# Patient Record
Sex: FEMALE | Race: ASIAN | NOT HISPANIC OR LATINO | Employment: FULL TIME | URBAN - METROPOLITAN AREA
[De-identification: names, ages, dates, MRNs, and addresses within clinical notes are randomized per-mention and may not be internally consistent; named-entity substitution may affect disease eponyms.]

---

## 2017-05-15 ENCOUNTER — OFFICE VISIT (OUTPATIENT)
Dept: FAMILY MEDICINE | Facility: CLINIC | Age: 37
End: 2017-05-15
Payer: COMMERCIAL

## 2017-05-15 VITALS
DIASTOLIC BLOOD PRESSURE: 77 MMHG | OXYGEN SATURATION: 100 % | WEIGHT: 121.13 LBS | SYSTOLIC BLOOD PRESSURE: 124 MMHG | TEMPERATURE: 98.3 F | BODY MASS INDEX: 25.42 KG/M2 | HEIGHT: 58 IN | HEART RATE: 69 BPM

## 2017-05-15 DIAGNOSIS — N91.2 AMENORRHEA: ICD-10-CM

## 2017-05-15 DIAGNOSIS — Z32.01 PREGNANCY TEST POSITIVE: Primary | ICD-10-CM

## 2017-05-15 LAB — BETA HCG QUAL IFA URINE: POSITIVE

## 2017-05-15 PROCEDURE — 84703 CHORIONIC GONADOTROPIN ASSAY: CPT | Performed by: FAMILY MEDICINE

## 2017-05-15 PROCEDURE — 99213 OFFICE O/P EST LOW 20 MIN: CPT | Performed by: FAMILY MEDICINE

## 2017-05-15 RX ORDER — PRENATAL VIT/IRON FUM/FOLIC AC 27MG-0.8MG
1 TABLET ORAL DAILY
Qty: 100 TABLET | Refills: 3 | Status: SHIPPED | OUTPATIENT
Start: 2017-05-15 | End: 2018-02-22

## 2017-05-15 NOTE — MR AVS SNAPSHOT
After Visit Summary   5/15/2017    Reji Lockwood    MRN: [id number]           Patient Information     Date Of Birth          1980        Visit Information        Provider Department      5/15/2017 3:15 PM Fadia Sauceda MD; [employer] [facility]        Today's Diagnoses     Pregnancy test positive    -  1    Amenorrhea           Follow-ups after your visit        Additional Services     OB/GYN REFERRAL       Your provider has referred you to:  [facility]: [facility] [phone]   [email or web address]    Please be aware that coverage of these services is subject to the terms and limitations of your health insurance plan.  Call member services at your health plan with any benefit or coverage questions.      Please bring the following with you to your appointment:    (1) Any X-Rays, CTs or MRIs which have been performed.  Contact the facility where they were done to arrange for  prior to your scheduled appointment.   (2) List of current medications   (3) This referral request   (4) Any documents/labs given to you for this referral                  Who to contact     If you have questions or need follow up information about today's clinic visit or your schedule please contact [facility] directly at [phone].  Normal or non-critical lab and imaging results will be communicated to you by MyChart, letter or phone within 4 business days after the clinic has received the results. If you do not hear from us within 7 days, please contact the clinic through MyChart or phone. If you have a critical or abnormal lab result, we will notify you by phone as soon as possible.  Submit refill requests through [employer] or call your pharmacy and they will forward the refill request to us. Please allow 3 business days for your refill to be completed.          Additional Information  "About Your Visit        Boomrathar[employer]iWide Information     [employer] lets you send messages to your doctor, view your test results, renew your prescriptions, schedule appointments and more. To sign up, go to [email or web address] . Click on \"Log in\" on the left side of the screen, which will take you to the Welcome page. Then click on \"Sign up Now\" on the right side of the page.     You will be asked to enter the access code listed below, as well as some personal information. Please follow the directions to create your username and password.     Your access code is: [email or web address]  Expires: 2017  3:59 PM     Your access code will  in 90 days. If you need help or a new code, please call your [place] clinic or [phone].        Care EveryWhere ID     This is your Care EveryWhere ID. This could be used by other organizations to access your [place] medical records  [id number]        Your Vitals Were     Pulse Temperature Height Last Period Pulse Oximetry Breastfeeding?    69 98.3  F (36.8  C) (Oral) 4' 10\" (1.473 m) 2017 (Approximate) 100% No    BMI (Body Mass Index)                   25.32 kg/m2            Blood Pressure from Last 3 Encounters:   05/15/17 124/77   09/02/15 126/82   07/28/15 130/78    Weight from Last 3 Encounters:   05/15/17 121 lb 2 oz (54.9 kg)   09/02/15 131 lb (59.4 kg)   07/28/15 132 lb (59.9 kg)              We Performed the Following     Beta HCG qual IFA urine     OB/GYN REFERRAL          Today's Medication Changes          These changes are accurate as of: 5/15/17  3:59 PM.  If you have any questions, ask your nurse or doctor.               Start taking these medicines.        Dose/Directions    prenatal multivitamin  plus iron 27-0.8 MG Tabs per tablet   Used for:  Pregnancy test positive   Started by:  Fadia Sauceda MD        Dose:  1 tablet   Take 1 tablet by mouth daily   Quantity:  100 tablet   Refills:  3            Where to get your medicines      These " medications were sent to [facility] - [facility], [place] - [address] [facility]  [address], [facility] [address]     Phone:  [phone]     prenatal multivitamin  plus iron 27-0.8 MG Tabs per tablet                Primary Care Provider    None Specified       No primary provider on file.        Thank you!     Thank you for choosing [facility]  for your care. Our goal is always to provide you with excellent care. Hearing back from our patients is one way we can continue to improve our services. Please take a few minutes to complete the written survey that you may receive in the mail after your visit with us. Thank you!             Your Updated Medication List - Protect others around you: Learn how to safely use, store and throw away your medicines at www.disposemymeds.org.          This list is accurate as of: 5/15/17  3:59 PM.  Always use your most recent med list.                   Brand Name Dispense Instructions for use    cyclobenzaprine 10 MG tablet    FLEXERIL    30 tablet    Take 0.5 tablets (5 mg) by mouth 3 times daily as needed for muscle spasms       ibuprofen 400 MG tablet    ADVIL/MOTRIN    45 tablet    Take 1 tablet (400 mg) by mouth every 6 hours as needed for moderate pain       polyethylene glycol powder    MIRALAX    510 g    Take 17 g by mouth daily       prenatal multivitamin  plus iron 27-0.8 MG Tabs per tablet     100 tablet    Take 1 tablet by mouth daily

## 2017-05-15 NOTE — PROGRESS NOTES
"  SUBJECTIVE:                                                    Reji Lockwood is a 36 year old female who presents to clinic today for the following health issues:         Acute illness concerns: fatigue, runny nose  Onset: 1 month    Fever: no    Chills/Sweats: YES- chills     Headache (location?): YES- on sides of head     Sinus Pressure:no    Conjunctivitis:  no    Ear Pain: no    Rhinorrhea: YES    Congestion: no    Sore Throat: no     Cough: YES - clear phlegm sometimes     Wheeze: no    Decreased Appetite: YES    Nausea: yes- sometimes     Vomiting: no    Diarrhea:  no    Dysuria/Freq.: no    Fatigue/Achiness: YES    Sick/Strep Exposure: no  LMP: Approx 3/28/17  Took pregnancy test last week and it was positive   Therapies Tried and outcome: claritan       Patient believes the above symptoms are related to pregnancy and would like testing and not evaluation of pregnancy.    Problem list and histories reviewed & adjusted, as indicated.  Additional history: as documented    Patient Active Problem List   Diagnosis     CARDIOVASCULAR SCREENING; LDL GOAL LESS THAN 160     Irregular menses     Past Surgical History:   Procedure Laterality Date     NO HISTORY OF SURGERY         Social History   Substance Use Topics     Smoking status: Never Smoker     Smokeless tobacco: Never Used     Alcohol use No     Family History   Problem Relation Age of Onset     KIDNEY DISEASE Mother      Dead     Unknown/Adopted Father            Reviewed and updated as needed this visit by clinical staff       Reviewed and updated as needed this visit by Provider         ROS:  Constitutional, HEENT, cardiovascular, pulmonary, gi and gu systems are negative, except as otherwise noted.    OBJECTIVE:                                                    /77  Pulse 69  Temp 98.3  F (36.8  C) (Oral)  Ht 4' 10\" (1.473 m)  Wt 121 lb 2 oz (54.9 kg)  LMP 03/28/2017 (Approximate)  SpO2 100%  Breastfeeding? No  BMI 25.32 kg/m2  Body mass index " is 25.32 kg/(m^2).  GENERAL: healthy, alert and no distress  NECK: no adenopathy, no asymmetry, masses, or scars and thyroid normal to palpation  RESP: lungs clear to auscultation - no rales, rhonchi or wheezes  CV: regular rate and rhythm, normal S1 S2, no S3 or S4, no murmur, click or rub, no peripheral edema and peripheral pulses strong  ABDOMEN: soft, nontender, no hepatosplenomegaly, no masses and bowel sounds normal  MS: no gross musculoskeletal defects noted, no edema  PSYCH: mentation appears normal, affect normal/bright    Diagnostic Test Results:  Results for orders placed or performed in visit on 05/15/17 (from the past 24 hour(s))   Beta HCG qual IFA urine   Result Value Ref Range    Beta HCG Qual IFA Urine Positive (A) NEG        ASSESSMENT/PLAN:                                                    1. Pregnancy test positive  Referral to OB and start PNV  - OB/GYN REFERRAL  - Prenatal Vit-Fe Fumarate-FA (PRENATAL MULTIVITAMIN  PLUS IRON) 27-0.8 MG TABS per tablet; Take 1 tablet by mouth daily  Dispense: 100 tablet; Refill: 3    2. Amenorrhea    - Beta HCG qual IFA urine    Needs to be seen for more.     FUTURE APPOINTMENTS:       - Follow-up visit with OB    Fadia Serrano MD  [facility]

## 2017-05-15 NOTE — NURSING NOTE
"Chief Complaint   Patient presents with     URI       Initial /77  Pulse 69  Temp 98.3  F (36.8  C) (Oral)  Ht 4' 10\" (1.473 m)  Wt 121 lb 2 oz (54.9 kg)  LMP 03/28/2017 (Approximate)  SpO2 100%  Breastfeeding? No  BMI 25.32 kg/m2 Estimated body mass index is 25.32 kg/(m^2) as calculated from the following:    Height as of this encounter: 4' 10\" (1.473 m).    Weight as of this encounter: 121 lb 2 oz (54.9 kg).  Medication Reconciliation: complete     Sakina Baxter CMA      "

## 2017-06-09 ENCOUNTER — PRENATAL OFFICE VISIT (OUTPATIENT)
Dept: OBGYN | Facility: CLINIC | Age: 37
End: 2017-06-09
Payer: COMMERCIAL

## 2017-06-09 VITALS
SYSTOLIC BLOOD PRESSURE: 122 MMHG | HEIGHT: 58 IN | HEART RATE: 77 BPM | DIASTOLIC BLOOD PRESSURE: 71 MMHG | OXYGEN SATURATION: 100 % | WEIGHT: 128 LBS | TEMPERATURE: 98.9 F | BODY MASS INDEX: 26.87 KG/M2

## 2017-06-09 DIAGNOSIS — Z34.80 PRENATAL CARE, SUBSEQUENT PREGNANCY, UNSPECIFIED TRIMESTER: Primary | ICD-10-CM

## 2017-06-09 LAB
ERYTHROCYTE [DISTWIDTH] IN BLOOD BY AUTOMATED COUNT: 13.3 % (ref 10–15)
HCT VFR BLD AUTO: 38.9 % (ref 35–47)
HGB BLD-MCNC: 13 G/DL (ref 11.7–15.7)
MCH RBC QN AUTO: 29.1 PG (ref 26.5–33)
MCHC RBC AUTO-ENTMCNC: 33.4 G/DL (ref 31.5–36.5)
MCV RBC AUTO: 87 FL (ref 78–100)
PLATELET # BLD AUTO: 204 10E9/L (ref 150–450)
RBC # BLD AUTO: 4.46 10E12/L (ref 3.8–5.2)
WBC # BLD AUTO: 8.7 10E9/L (ref 4–11)

## 2017-06-09 PROCEDURE — 86870 RBC ANTIBODY IDENTIFICATION: CPT | Performed by: OBSTETRICS & GYNECOLOGY

## 2017-06-09 PROCEDURE — 87389 HIV-1 AG W/HIV-1&-2 AB AG IA: CPT | Performed by: OBSTETRICS & GYNECOLOGY

## 2017-06-09 PROCEDURE — 86762 RUBELLA ANTIBODY: CPT | Performed by: OBSTETRICS & GYNECOLOGY

## 2017-06-09 PROCEDURE — 85027 COMPLETE CBC AUTOMATED: CPT | Performed by: OBSTETRICS & GYNECOLOGY

## 2017-06-09 PROCEDURE — 86780 TREPONEMA PALLIDUM: CPT | Performed by: OBSTETRICS & GYNECOLOGY

## 2017-06-09 PROCEDURE — 36415 COLL VENOUS BLD VENIPUNCTURE: CPT | Performed by: OBSTETRICS & GYNECOLOGY

## 2017-06-09 PROCEDURE — 87341 HEP B SURFACE AG NEUTRLZJ IA: CPT | Performed by: OBSTETRICS & GYNECOLOGY

## 2017-06-09 PROCEDURE — 99207 ZZC FIRST OB VISIT: CPT | Performed by: OBSTETRICS & GYNECOLOGY

## 2017-06-09 PROCEDURE — 87340 HEPATITIS B SURFACE AG IA: CPT | Performed by: OBSTETRICS & GYNECOLOGY

## 2017-06-09 PROCEDURE — 87086 URINE CULTURE/COLONY COUNT: CPT | Performed by: OBSTETRICS & GYNECOLOGY

## 2017-06-09 PROCEDURE — 86850 RBC ANTIBODY SCREEN: CPT | Performed by: OBSTETRICS & GYNECOLOGY

## 2017-06-09 PROCEDURE — 86900 BLOOD TYPING SEROLOGIC ABO: CPT | Performed by: OBSTETRICS & GYNECOLOGY

## 2017-06-09 PROCEDURE — 86901 BLOOD TYPING SEROLOGIC RH(D): CPT | Performed by: OBSTETRICS & GYNECOLOGY

## 2017-06-09 ASSESSMENT — PAIN SCALES - GENERAL: PAINLEVEL: NO PAIN (0)

## 2017-06-09 NOTE — LETTER
2017    RE: Reji Lockwood,  1980    To Whom It May Concern:    Reji Lockwood is under our care and was seen at our office on 2017.    Due to her medical condition and complications in her pregnancy she should limit her work hours to 8 hours/day, 40 hours/week without overtime hours, effective 2017. Her due date is 2018.     I hope this information is sufficient for your needs.  If you have any additional questions regarding this matter please contact our office.  Thank you.      Sincerely,        Main Norwood MD

## 2017-06-09 NOTE — NURSING NOTE
"Chief Complaint   Patient presents with     Prenatal Care       Initial /71 (BP Location: Left arm, Patient Position: Chair, Cuff Size: Adult Regular)  Pulse 77  Temp 98.9  F (37.2  C) (Oral)  Ht 4' 10\" (1.473 m)  Wt 128 lb (58.1 kg)  LMP 03/28/2017 (Approximate)  SpO2 100%  BMI 26.75 kg/m2 Estimated body mass index is 26.75 kg/(m^2) as calculated from the following:    Height as of this encounter: 4' 10\" (1.473 m).    Weight as of this encounter: 128 lb (58.1 kg).  Medication Reconciliation: christiano Laureano MA      "

## 2017-06-09 NOTE — MR AVS SNAPSHOT
"              After Visit Summary   2017    Reji Lockwood    MRN: [id number]           Patient Information     Date Of Birth          1980        Visit Information        Provider Department      2017 2:45 PM Mani Norwood MD; [employer] [facility]         Follow-ups after your visit        Who to contact     If you have questions or need follow up information about today's clinic visit or your schedule please contact [facility] directly at [phone].  Normal or non-critical lab and imaging results will be communicated to you by [employer], letter or phone within 4 business days after the clinic has received the results. If you do not hear from us within 7 days, please contact the clinic through [employer] or phone. If you have a critical or abnormal lab result, we will notify you by phone as soon as possible.  Submit refill requests through [employer] or call your pharmacy and they will forward the refill request to us. Please allow 3 business days for your refill to be completed.          Additional Information About Your Visit        MyChart Information     [employer]Who lets you send messages to your doctor, view your test results, renew your prescriptions, schedule appointments and more. To sign up, go to [email or web address] . Click on \"Log in\" on the left side of the screen, which will take you to the Welcome page. Then click on \"Sign up Now\" on the right side of the page.     You will be asked to enter the access code listed below, as well as some personal information. Please follow the directions to create your username and password.     Your access code is: [email or web address]  Expires: 2017  3:59 PM     Your access code will  in 90 days. If you need help or a new code, please call your [facility] or [phone].        Care EveryWhere ID     This is your Care EveryWhere ID. This could be used by other organizations to access your " "[place] medical records  [id number]        Your Vitals Were     Pulse Temperature Height Last Period Pulse Oximetry BMI (Body Mass Index)    77 98.9  F (37.2  C) (Oral) 4' 10\" (1.473 m) 03/28/2017 (Approximate) 100% 26.75 kg/m2       Blood Pressure from Last 3 Encounters:   06/09/17 122/71   05/15/17 124/77   09/02/15 126/82    Weight from Last 3 Encounters:   06/09/17 128 lb (58.1 kg)   05/15/17 121 lb 2 oz (54.9 kg)   09/02/15 131 lb (59.4 kg)              Today, you had the following     No orders found for display       Primary Care Provider    None Specified       No primary provider on file.        Thank you!     Thank you for choosing [facility]  for your care. Our goal is always to provide you with excellent care. Hearing back from our patients is one way we can continue to improve our services. Please take a few minutes to complete the written survey that you may receive in the mail after your visit with us. Thank you!             Your Updated Medication List - Protect others around you: Learn how to safely use, store and throw away your medicines at www.disposemymeds.org.          This list is accurate as of: 6/9/17  3:10 PM.  Always use your most recent med list.                   Brand Name Dispense Instructions for use    cyclobenzaprine 10 MG tablet    FLEXERIL    30 tablet    Take 0.5 tablets (5 mg) by mouth 3 times daily as needed for muscle spasms       ibuprofen 400 MG tablet    ADVIL/MOTRIN    45 tablet    Take 1 tablet (400 mg) by mouth every 6 hours as needed for moderate pain       polyethylene glycol powder    MIRALAX    510 g    Take 17 g by mouth daily       prenatal multivitamin  plus iron 27-0.8 MG Tabs per tablet     100 tablet    Take 1 tablet by mouth daily         "

## 2017-06-10 PROBLEM — O09.90 SUPERVISION OF HIGH RISK PREGNANCY, ANTEPARTUM: Status: ACTIVE | Noted: 2017-06-10

## 2017-06-10 PROBLEM — O09.529 AMA (ADVANCED MATERNAL AGE) MULTIGRAVIDA 35+: Status: ACTIVE | Noted: 2017-06-10

## 2017-06-10 LAB
BACTERIA SPEC CULT: NO GROWTH
MICRO REPORT STATUS: NORMAL
SPECIMEN SOURCE: NORMAL
T PALLIDUM IGG+IGM SER QL: NEGATIVE

## 2017-06-10 NOTE — PROGRESS NOTES
Reji Lockwood is a 36 year old year old G 2 P 1 who presents for an initial obstetrical visit.  Referred by Dr. Ubaldo Serrano.    Currently experiencing normal pregnancy symptoms without particular problems including pain, bleeding, marked vomiting or weight loss except: no exceptions.   This was a planned pregnancy and trying for 7 yrs.  Here today alone.   Additional concerns: uncertain dates, past bleeding and  labor and PPROM x 10 days prior to - son has chronic problems and cannot walk at age 9 and has care at [facility].    Given letter now to avoid to overtime work.     ROS:     Systems reviewed include constitutional; breast;                 cardiac; respiratory; gastrointestinal; genitourinary;                                musculoskeletal; integumentary; psychological;                                hematologic/lymphatic and endocrine.                  These systems were negative for significant symptoms except                 for the following: none and see above HPI.    Past medical, obstetrical, surgical, family and social history reviewed and as noted or updated in chart.     Allergies, meds and supplements are as noted or updated in chart.                    Episode OB dating, demographic and history forms completed or reviewed.    EXAM:  VS as noted. BMI- Body mass index is 26.75 kg/(m^2).    Relatively recent normal general exam- not repeated now.       ASSESSMENT: (Z34.80) Prenatal care, subsequent pregnancy, unspecified trimester  (primary encounter diagnosis)  Comment:   Plan: CBC with Platelets, HIV Antigen Antibody Combo,        Rubella Antibody IgG Quantitative, Hepatitis B         surface antigen, Anti Treponema, ABO/RH Type         and Screen, Urine Culture Aerobic Bacterial, US        OB < 14 weeks, single,  for dating ([id number])               PLAN:  Advice appropriate to gestational age reviewed including pertinent Checklist items. Discussed condition, tests and general care plan.  Symptoms, problems and concerns reviewed. Recommended weight gain discussed. Problem list initiated. Check u/s now. Pap due in 2018. Orders as noted. RTC in 4 weeks. Will review delivery note; cerclage or cervix surveillance or 17P? Discussed AMA briefly and discuss special screening tests next.  Addn: Review of records indicated  labor suspected instead of incompetent cervix. Consider 17P.      Main Norwood MD

## 2017-06-12 ENCOUNTER — RADIANT APPOINTMENT (OUTPATIENT)
Dept: ULTRASOUND IMAGING | Facility: CLINIC | Age: 37
End: 2017-06-12
Attending: OBSTETRICS & GYNECOLOGY
Payer: COMMERCIAL

## 2017-06-12 DIAGNOSIS — Z34.80 PRENATAL CARE, SUBSEQUENT PREGNANCY, UNSPECIFIED TRIMESTER: ICD-10-CM

## 2017-06-12 LAB
HIV 1+2 AB+HIV1 P24 AG SERPL QL IA: NORMAL
RUBV IGG SERPL IA-ACNC: 177 IU/ML

## 2017-06-12 PROCEDURE — 76801 OB US < 14 WKS SINGLE FETUS: CPT

## 2017-06-13 PROBLEM — R76.8 RED BLOOD CELL ANTIBODY POSITIVE: Status: ACTIVE | Noted: 2017-06-13

## 2017-06-13 LAB — HBV SURFACE AG SERPL QL IA: REACTIVE

## 2017-06-16 ENCOUNTER — TELEPHONE (OUTPATIENT)
Dept: OBGYN | Facility: CLINIC | Age: 37
End: 2017-06-16

## 2017-06-16 DIAGNOSIS — O09.521 AMA (ADVANCED MATERNAL AGE) MULTIGRAVIDA 35+, FIRST TRIMESTER: ICD-10-CM

## 2017-06-16 DIAGNOSIS — R76.8 RED BLOOD CELL ANTIBODY POSITIVE: ICD-10-CM

## 2017-06-16 DIAGNOSIS — O09.91 SUPERVISION OF HIGH RISK PREGNANCY, ANTEPARTUM, FIRST TRIMESTER: ICD-10-CM

## 2017-06-16 NOTE — TELEPHONE ENCOUNTER
..Reason for Call:    letter for employerkavin    Detailed comments: is having difficulty getting to work due to morning sickness; first available is 06-29 and declined, as is too  Far out. Call for details    Phone Number Patient can be reached at: Home number on file [phone] (home)    Best Time: anyitme    Can we leave a detailed message on this number? YES    Call taken on 6/16/2017 at 10:02 AM by Sasha De La O

## 2017-06-16 NOTE — TELEPHONE ENCOUNTER
Patient called back without an .  I hung up and used a [employer] .  Patient was seen at the ER for nausea and was given IV fluids, Tylenol for back pain and Ondansetron for nausea.  She is currently home resting.  Patient reports nausea and feeling weak and shaky.  She is vomiting a few times a day and noticed increased saliva in her mouth.  She is trying to eat and drink as able. Patient is requesting time off x 2-3 months as she finds it difficult to go to her job.  She didn't go to work yesterday or today.  I advised the ER if she is feeling weak or dehydrated.  I scheduled her with Dr. Lennon on Monday 6/ to discuss the nausea and SHELLI.  Vianey Perry RN

## 2017-06-16 NOTE — TELEPHONE ENCOUNTER
Used  # [id number].  Left a message for patient to call back at [phone] and ask for Vianey in women's.  Can offer her Monday in  with Dr. Ordoñez if she wants to see a provider.  Dr. Norwood's schedule is completely full.  He did a letter at her visit last week.  Uncertain if she is needing more.  She does need to come in for labs.  Dr. Norwood ordered additional labs regarding her hep B carrier status.  Lab wasn't able to add them on to the existing blood.  Needs to come back for lab.  Vianey Perry RN

## 2017-06-17 ENCOUNTER — TRANSFERRED RECORDS (OUTPATIENT)
Dept: HEALTH INFORMATION MANAGEMENT | Facility: CLINIC | Age: 37
End: 2017-06-17

## 2017-06-19 ENCOUNTER — PRENATAL OFFICE VISIT (OUTPATIENT)
Dept: OBGYN | Facility: CLINIC | Age: 37
End: 2017-06-19
Payer: COMMERCIAL

## 2017-06-19 ENCOUNTER — OFFICE VISIT (OUTPATIENT)
Dept: FAMILY MEDICINE | Facility: CLINIC | Age: 37
End: 2017-06-19
Payer: COMMERCIAL

## 2017-06-19 VITALS
DIASTOLIC BLOOD PRESSURE: 67 MMHG | TEMPERATURE: 97.9 F | SYSTOLIC BLOOD PRESSURE: 103 MMHG | WEIGHT: 129 LBS | HEART RATE: 79 BPM | OXYGEN SATURATION: 100 % | BODY MASS INDEX: 26.96 KG/M2

## 2017-06-19 VITALS
WEIGHT: 128.9 LBS | SYSTOLIC BLOOD PRESSURE: 105 MMHG | DIASTOLIC BLOOD PRESSURE: 69 MMHG | HEART RATE: 67 BPM | BODY MASS INDEX: 26.94 KG/M2 | OXYGEN SATURATION: 100 %

## 2017-06-19 DIAGNOSIS — M54.50 ACUTE MIDLINE LOW BACK PAIN WITHOUT SCIATICA: Primary | ICD-10-CM

## 2017-06-19 DIAGNOSIS — O21.0 HYPEREMESIS GRAVIDARUM: Primary | ICD-10-CM

## 2017-06-19 DIAGNOSIS — Z23 NEED FOR PROPHYLACTIC VACCINATION WITH TETANUS-DIPHTHERIA (TD): ICD-10-CM

## 2017-06-19 DIAGNOSIS — R73.9 HYPERGLYCEMIA: ICD-10-CM

## 2017-06-19 DIAGNOSIS — Z34.90 PREGNANCY, UNSPECIFIED GESTATIONAL AGE: ICD-10-CM

## 2017-06-19 LAB — HBA1C MFR BLD: 5.7 % (ref 4.3–6)

## 2017-06-19 PROCEDURE — 83036 HEMOGLOBIN GLYCOSYLATED A1C: CPT | Performed by: FAMILY MEDICINE

## 2017-06-19 PROCEDURE — 90715 TDAP VACCINE 7 YRS/> IM: CPT | Performed by: FAMILY MEDICINE

## 2017-06-19 PROCEDURE — 99207 ZZC PRENATAL VISIT: CPT | Performed by: OBSTETRICS & GYNECOLOGY

## 2017-06-19 PROCEDURE — 90471 IMMUNIZATION ADMIN: CPT | Performed by: FAMILY MEDICINE

## 2017-06-19 PROCEDURE — 99213 OFFICE O/P EST LOW 20 MIN: CPT | Mod: 25 | Performed by: FAMILY MEDICINE

## 2017-06-19 PROCEDURE — 36415 COLL VENOUS BLD VENIPUNCTURE: CPT | Performed by: FAMILY MEDICINE

## 2017-06-19 RX ORDER — ACETAMINOPHEN 500 MG
500-1000 TABLET ORAL
COMMUNITY
Start: 2017-06-14 | End: 2017-11-03

## 2017-06-19 RX ORDER — ONDANSETRON 4 MG/1
TABLET, ORALLY DISINTEGRATING ORAL
Refills: 0 | COMMUNITY
Start: 2017-06-15 | End: 2017-07-07

## 2017-06-19 NOTE — MR AVS SNAPSHOT
After Visit Summary   6/19/2017    Reji Lockwood    MRN: [id number]           Patient Information     Date Of Birth          1980        Visit Information        Provider Department      6/19/2017 9:15 AM Tova Ordoñez DO; [employer] [facility]        Care Instructions                                                         If you have any questions regarding your visit, Please contact your care team.    [facility] CLINIC HOURS TELEPHONE NUMBER   Tova Ordoñez DO.    DESHAUN Zuñiga -    NAYA Geronimo RN       Monday, Wednesday, Thursday and Friday[facility]  8:30a.m-5:00 p.m   [facility]  [address] [place] [address]  [phone] ask for [facility]    Imaging [phone]       Urgent Care locations:    [facility] Saturday and Sunday   9 am - 5 pm    Monday-Friday   12 pm - 8 pm  Saturday and Sunday   9 am - 5 pm   [phone]     [facility] Labor and Delivery:  [phone]    If you need a medication refill, please contact your pharmacy. Please allow 3 business days for your refill to be completed.  As always, Thank you for trusting us with your healthcare needs!                Follow-ups after your visit        Your next 10 appointments already scheduled     Jun 19, 2017  3:15 PM CDT   [facility]    [address] [place] [id number] [phone]           Bring a current list of meds and any records pertaining to this visit.  For Physicals, please bring immunization records and any forms needing to be filled out.  Please arrive 10 minutes early to complete paperwork.            Jul 07, 2017  3:00 PM CDT   ESTABLISHED PRENATAL with Main Norwood MD   [facility]  "([facility])    [address] [facility] [id number] [phone]              Who to contact     If you have questions or need follow up information about today's clinic visit or your schedule please contact [facility] directly at [phone].  Normal or non-critical lab and imaging results will be communicated to you by MyChart, letter or phone within 4 business days after the clinic has received the results. If you do not hear from us within 7 days, please contact the clinic through MyChart or phone. If you have a critical or abnormal lab result, we will notify you by phone as soon as possible.  Submit refill requests through [employer] or call your pharmacy and they will forward the refill request to us. Please allow 3 business days for your refill to be completed.          Additional Information About Your Visit        MyChart Information     [employer] lets you send messages to your doctor, view your test results, renew your prescriptions, schedule appointments and more. To sign up, go to [email or web address] . Click on \"Log in\" on the left side of the screen, which will take you to the Welcome page. Then click on \"Sign up Now\" on the right side of the page.     You will be asked to enter the access code listed below, as well as some personal information. Please follow the directions to create your username and password.     Your access code is: [email or web address]  Expires: 2017  3:59 PM     Your access code will  in 90 days. If you need help or a new code, please call your [place] clinic or [phone].        Care EveryWhere ID     This is your Care EveryWhere ID. This could be used by other organizations to access your [place] medical records  [id number]        Your Vitals Were     Pulse Last Period Pulse Oximetry Breastfeeding? BMI (Body Mass Index)       67 2017 (Within Weeks) 100% No 26.94 kg/m2        Blood Pressure from Last 3 " Encounters:   06/19/17 105/69   06/09/17 122/71   05/15/17 124/77    Weight from Last 3 Encounters:   06/19/17 58.5 kg (128 lb 14.4 oz)   06/09/17 58.1 kg (128 lb)   05/15/17 54.9 kg (121 lb 2 oz)              Today, you had the following     No orders found for display       Primary Care Provider    None Specified       No primary provider on file.        Thank you!     Thank you for choosing [facility]  for your care. Our goal is always to provide you with excellent care. Hearing back from our patients is one way we can continue to improve our services. Please take a few minutes to complete the written survey that you may receive in the mail after your visit with us. Thank you!             Your Updated Medication List - Protect others around you: Learn how to safely use, store and throw away your medicines at www.disposemymeds.org.          This list is accurate as of: 6/19/17  9:31 AM.  Always use your most recent med list.                   Brand Name Dispense Instructions for use    acetaminophen 500 MG tablet    TYLENOL     Take 500-1,000 mg by mouth       ondansetron 4 MG ODT tab    ZOFRAN-ODT     TK 1 T PO Q 8 H PRN N       prenatal multivitamin  plus iron 27-0.8 MG Tabs per tablet     100 tablet    Take 1 tablet by mouth daily

## 2017-06-19 NOTE — MR AVS SNAPSHOT
After Visit Summary   6/19/2017    Reji Lockwood    MRN: [id number]           Patient Information     Date Of Birth          1980        Visit Information        Provider Department      6/19/2017 3:15 PM Fadia Sauceda MD; [employer] [facility]        Today's Diagnoses     Acute midline low back pain without sciatica    -  1    Hyperglycemia        Pregnancy, unspecified gestational age        Need for prophylactic vaccination with tetanus-diphtheria (TD)          Care Instructions    Based on your medical history and these are the current health maintenance or preventive care services that you are due for (some may have been done at this visit)  Health Maintenance Due   Topic Date Due     TETANUS IMMUNIZATION (SYSTEM ASSIGNED)  06/15/2000     MATERNAL SCREENING  07/11/2017         At [facility], we strive to deliver an exceptional experience to you, every time we see you.    If you receive a survey in the mail, please send us back your thoughts. We really do value your feedback.    Your care team's suggested websites for health information:  Www.[facility].org : Up to date and easily searchable information on multiple topics.  Www.medlineplus.gov : medication info, interactive tutorials, watch real surgeries online  Www.familydoctor.org : good info from the Academy of Family Physicians  Www.cdc.gov : public health info, travel advisories, epidemics (H1N1)  Www.aap.org : children's health info, normal development, vaccinations  Www.health.state[place]us : [place] dept of health, public health issues in [place], N1N1    How to contact your care team:   Team Mera/Spirit [phone]         Pharmacy [phone]    Dr. Waters, Katie Raymundo PA-C, Tuyet Rivas CNP, Ramona Pratt PA-C, Dr. Dawson, and ARVIND Curiel CNP    Team RNs: Aleida Thakur      Clinic hours  M-Th 7 am-7 pm   Fri 7 am-5 pm.  "  Urgent care M-F 11 am-9 pm,   Sat/Sun 9 am-5 pm.  Pharmacy M-Th 8 am-8 pm Fri 8 am-6 pm  Sat/Sun 9 am-5 pm.     All password changes, disabled accounts, or ID changes in "[employer]"/MyHealth will be done by our Access Services Department.    If you need help with your account or password, call: [phone]. Clinic staff no longer has the ability to change passwords.             Follow-ups after your visit        Your next 10 appointments already scheduled     Jul 07, 2017  3:00 PM CDT   ESTABL[facility] PRENATAL with Main Norwood [facility]    [address] [place] [address] [phone]              Who to contact     If you have questions or need follow up information about today's clinic visit or your schedule please contact [facility] directly at [phone].  Normal or non-critical lab and imaging results will be communicated to you by Pronotahart, letter or phone within 4 business days after the clinic has received the results. If you do not hear from us within 7 days, please contact the clinic through Pronotahart or phone. If you have a critical or abnormal lab result, we will notify you by phone as soon as possible.  Submit refill requests through "[employer]" or call your pharmacy and they will forward the refill request to us. Please allow 3 business days for your refill to be completed.          Additional Information About Your Visit        "[employer]" Information     "[employer]" lets you send messages to your doctor, view your test results, renew your prescriptions, schedule appointments and more. To sign up, go to [email or web address]" . Click on \"Log in\" on the left side of the screen, which will take you to the Welcome page. Then click on \"Sign up Now\" on the right side of the page.     You will be asked to enter the access code listed below, as well as some personal information. Please follow the directions to " create your username and password.     Your access code is: [email or web address]  Expires: 2017  3:59 PM     Your access code will  in 90 days. If you need help or a new code, please call your [facility] or [phone].        Care EveryWhere ID     This is your Care EveryWhere ID. This could be used by other organizations to access your [place] medical records  [id number]        Your Vitals Were     Pulse Temperature Last Period Pulse Oximetry BMI (Body Mass Index)       79 97.9  F (36.6  C) (Oral) 2017 (Within Weeks) 100% 26.96 kg/m2        Blood Pressure from Last 3 Encounters:   17 103/67   17 105/69   17 122/71    Weight from Last 3 Encounters:   17 129 lb (58.5 kg)   17 128 lb 14.4 oz (58.5 kg)   17 128 lb (58.1 kg)              We Performed the Following          ADMIN VACCINE, FIRST     Hemoglobin A1c     TDAP VACCINE (ADACEL)        Primary Care Provider Office Phone # Fax #    Fadia Georgia Serrano [phone]       [facility] [address] [place]  [facility] [address]        Thank you!     Thank you for choosing [facility]  for your care. Our goal is always to provide you with excellent care. Hearing back from our patients is one way we can continue to improve our services. Please take a few minutes to complete the written survey that you may receive in the mail after your visit with us. Thank you!             Your Updated Medication List - Protect others around you: Learn how to safely use, store and throw away your medicines at www.disposemymeds.org.          This list is accurate as of: 17  3:29 PM.  Always use your most recent med list.                   Brand Name Dispense Instructions for use    acetaminophen 500 MG tablet    TYLENOL     Take 500-1,000 mg by mouth       ondansetron 4 MG ODT tab    ZOFRAN-ODT     TK 1 T PO Q 8 H PRN N       prenatal multivitamin  plus iron 27-0.8 MG  Tabs per tablet     100 tablet    Take 1 tablet by mouth daily

## 2017-06-19 NOTE — LETTER
[address]  [address]                6/19/2017      Reji Lockwood      TO WHOM IT MAY CONCERN:    Reji Lockwood was seen here today and was advised to work only 4 hours per day due to medical reason until further notice.  Please call if you have any questions or concerns regarding this.        Cordially,        Tova Ordoñez,   Dept of OB/GYN  [phone] (OFFICE)  [phone] (FAX)

## 2017-06-19 NOTE — PROGRESS NOTES
Ms. Lockwood,    Your lab test did not raise the concern for gestational diabetes at this time but please discuss this further with your obstetrician.    Please contact the clinic if you have additional questions.  Thank you.    Sincerely,    Fadia Serrano

## 2017-06-19 NOTE — PATIENT INSTRUCTIONS
Based on your medical history and these are the current health maintenance or preventive care services that you are due for (some may have been done at this visit)  Health Maintenance Due   Topic Date Due     TETANUS IMMUNIZATION (SYSTEM ASSIGNED)  06/15/2000     MATERNAL SCREENING  07/11/2017         At [facility], we strive to deliver an exceptional experience to you, every time we see you.    If you receive a survey in the mail, please send us back your thoughts. We really do value your feedback.    Your care team's suggested websites for health information:  Www.[employer].org : Up to date and easily searchable information on multiple topics.  Www.medlineplus.gov : medication info, interactive tutorials, watch real surgeries online  Www.familydoctor.org : good info from the Academy of Family Physicians  Www.cdc.gov : public health info, travel advisories, epidemics (H1N1)  Www.aap.org : children's health info, normal development, vaccinations  Www.health.[facility].[place].us : [place] dept of health, public health issues in [place], N1N1    How to contact your care team:   Team Mera/Erick [phone]         Pharmacy [phone]    Dr. Waters, Katie Raymundo PA-C, Dr. Aj, Tuyet SAMUELS CNP, Ramona Pratt PA-C, Dr. Dawson, and ARVIND Curiel CNP    Team RNs: Aleida & Ofe      Clinic hours  M-Th 7 am-7 pm   Fri 7 am-5 pm.   Urgent care M-F 11 am-9 pm,   Sat/Sun 9 am-5 pm.  Pharmacy M-Th 8 am-8 pm Fri 8 am-6 pm  Sat/Sun 9 am-5 pm.     All password changes, disabled accounts, or ID changes in [employer]/MyHealth will be done by our Access Services Department.    If you need help with your account or password, call: [phone]. Clinic staff no longer has the ability to change passwords.

## 2017-06-19 NOTE — NURSING NOTE
Screening Questionnaire for Adult Immunization    Are you sick today?   No   Do you have allergies to medications, food, a vaccine component or latex?   No   Have you ever had a serious reaction after receiving a vaccination?   No   Do you have a long-term health problem with heart disease, lung disease, asthma, kidney disease, metabolic disease (e.g. diabetes), anemia, or other blood disorder?   No   Do you have cancer, leukemia, HIV/AIDS, or any other immune system problem?   No   In the past 3 months, have you taken medications that affect  your immune system, such as prednisone, other steroids, or anticancer drugs; drugs for the treatment of rheumatoid arthritis, Crohn s disease, or psoriasis; or have you had radiation treatments?   No   Have you had a seizure, or a brain or other nervous system problem?   No   During the past year, have you received a transfusion of blood or blood     products, or been given immune (gamma) globulin or antiviral drug?   Yes   For women: Are you pregnant or is there a chance you could become        pregnant during the next month?   No   Have you received any vaccinations in the past 4 weeks?   No     Immunization questionnaire was positive for at least one answer.  Notified Ubaldo Serrano.      MNVFC doesn't apply on this patient    Per orders of Dr. Ubaldo Serrano, injection of Adacel given by Ariane Prieto. Patient instructed to remain in clinic for 20 minutes afterwards, and to report any adverse reaction to me immediately.       Screening performed by Ariane Prieto on 6/19/2017 at 3:32 PM.

## 2017-06-19 NOTE — LETTER
[facility]   [address] [place] [address]      June 19, 2017      Reji Lockwood  [address] [place] [address]            MsMaru Lockwood,    Your lab test did not raise the concern for gestational diabetes at this time but please discuss this further with your obstetrician.    Please contact the clinic if you have additional questions.  Thank you.    Sincerely,    Fadia Serrano/ak        Results for orders placed or performed in visit on 06/19/17   Hemoglobin A1c   Result Value Ref Range    Hemoglobin A1C 5.7 4.3 - 6.0 %

## 2017-06-19 NOTE — NURSING NOTE
"Chief Complaint   Patient presents with     ER F/U     [facility] 6/15/2017: Reason: Back Pain     Back Pain     E.D. Follow up.       Initial /67 (BP Location: Right arm, Patient Position: Chair, Cuff Size: Adult Regular)  Pulse 79  Temp 97.9  F (36.6  C) (Oral)  Wt 129 lb (58.5 kg)  LMP 03/28/2017 (Within Weeks)  SpO2 100%  BMI 26.96 kg/m2 Estimated body mass index is 26.96 kg/(m^2) as calculated from the following:    Height as of 6/9/17: 4' 10\" (1.473 m).    Weight as of this encounter: 129 lb (58.5 kg).  Medication Reconciliation: complete   An,CMA (AMAA)      "

## 2017-06-19 NOTE — PROGRESS NOTES
SUBJECTIVE:                                                    Reji Lockwood is a 37 year old female who presents to clinic today for the following health issues:      ED/UC Followup:    Facility:  [facility] E.D.  Date of visit: 6/15/2017  Reason for visit: Back Pain  Current Status: Now is okay     Back Pain      Duration: Follow up E.D.        Specific cause: none    Description:   Location of pain: low back both  Character of pain: sharp, dull ache and constant  Pain radiation:radiates into the right buttocks, radiates into the right leg, radiates into the left buttocks and radiates into the left leg  New numbness or weakness in legs, not attributed to pain:  no     Intensity: Currently 3/10, At its worst 9/10    History:   Pain interferes with job: YES  History of back problems: no prior back problems  Any previous MRI or X-rays: None  Sees a specialist for back pain:  No  Therapies tried without relief: na    Alleviating factors:   Improved by: na      Precipitating factors:  Worsened by: Lifting, Bending, Standing, Sitting, Lying Flat, Walking and Coughing    Functional and Psychosocial Screen (Juancarlos STarT Back):      Not performed today       Accompanying Signs & Symptoms:  Risk of Fracture:  None  Risk of Cauda Equina:  None  Risk of Infection:  None  Risk of Cancer:  None  Risk of Ankylosing Spondylitis:  Onset at age <35, male, AND morning back stiffness. no                  Low back pain - sudden onset.  Better now with tylenol.    Hyperglycemia - while in ED.  Told to have this rechecked.    Current pregnancy - due in January per patient.  Has OB visit in 3 weeks.    Problem list and histories reviewed & adjusted, as indicated.  Additional history: as documented    Patient Active Problem List   Diagnosis     CARDIOVASCULAR SCREENING; LDL GOAL LESS THAN 160     Supervision of high risk pregnancy, antepartum     AMA (advanced maternal age) multigravida 35+     Chronic viral hepatitis B without  delta agent and without coma (H)     Red blood cell antibody positive     Past Surgical History:   Procedure Laterality Date     NO HISTORY OF SURGERY         Social History   Substance Use Topics     Smoking status: Never Smoker     Smokeless tobacco: Never Used     Alcohol use No     Family History   Problem Relation Age of Onset     KIDNEY DISEASE Mother      Dead     Unknown/Adopted Father            Reviewed and updated as needed this visit by clinical staff       Reviewed and updated as needed this visit by Provider         ROS:  Constitutional, HEENT, cardiovascular, pulmonary, gi and gu systems are negative, except as otherwise noted.    OBJECTIVE:                                                    /67 (BP Location: Right arm, Patient Position: Chair, Cuff Size: Adult Regular)  Pulse 79  Temp 97.9  F (36.6  C) (Oral)  Wt 129 lb (58.5 kg)  LMP 03/28/2017 (Within Weeks)  SpO2 100%  BMI 26.96 kg/m2  Body mass index is 26.96 kg/(m^2).  GENERAL: healthy, alert and no distress  NECK: no adenopathy, no asymmetry, masses, or scars and thyroid normal to palpation  RESP: lungs clear to auscultation - no rales, rhonchi or wheezes  CV: regular rate and rhythm, normal S1 S2, no S3 or S4, no murmur, click or rub, no peripheral edema and peripheral pulses strong  ABDOMEN: soft, nontender, no hepatosplenomegaly, no masses and bowel sounds normal  MS: no gross musculoskeletal defects noted, no edema  PSYCH: mentation appears normal, affect normal/bright    Diagnostic Test Results:  none      ASSESSMENT/PLAN:                                                    1. Acute midline low back pain without sciatica  Improved - continue to monitor    2. Hyperglycemia  Screening lab  - Hemoglobin A1c    3. Pregnancy, unspecified gestational age  Continue with OB    4. Need for prophylactic vaccination with tetanus-diphtheria (TD)    - TDAP VACCINE (ADACEL)  -      ADMIN VACCINE, FIRST    FUTURE APPOINTMENTS:       -  Follow-up visit if pain returns or as needed.    Fadia Serrano MD  [facility]

## 2017-06-19 NOTE — PROGRESS NOTES
She has not felt fetal movement yet and denies any fluid leakage or uterine contractions.  Her recent US gave her an EDC of 1/15/18 so will check her date in the chart.  She gained 14.4 oz since her last visit and she admits to nausea and emesis making it hard to work and eat.  She requests a letter for work stating that she can only work 4 hours per day due to medical reasons.  She is taking Zofran which helps and she declines a refill at this time.  She is to switch from taking a PNV to just Folic Acid due to nausea issues with the vitamin.

## 2017-06-19 NOTE — NURSING NOTE
"Chief Complaint   Patient presents with     Prenatal Care     11w6d       Initial /69  Pulse 67  Wt 58.5 kg (128 lb 14.4 oz)  LMP 03/28/2017 (Within Weeks)  SpO2 100%  Breastfeeding? No  BMI 26.94 kg/m2 Estimated body mass index is 26.94 kg/(m^2) as calculated from the following:    Height as of 6/9/17: 1.473 m (4' 10\").    Weight as of this encounter: 58.5 kg (128 lb 14.4 oz).  Medication Reconciliation:   Zara Junior CMA  June 19, 2017 9:31 AM        "

## 2017-06-30 LAB
ABO + RH BLD: ABNORMAL
ABO + RH BLD: ABNORMAL
BLD GP AB INVEST PLASRBC-IMP: ABNORMAL
BLD GP AB SCN SERPL QL: ABNORMAL
BLOOD BANK CMNT PATIENT-IMP: ABNORMAL
BLOOD BANK CMNT PATIENT-IMP: ABNORMAL
SPECIMEN EXP DATE BLD: ABNORMAL

## 2017-07-07 ENCOUNTER — PRENATAL OFFICE VISIT (OUTPATIENT)
Dept: OBGYN | Facility: CLINIC | Age: 37
End: 2017-07-07
Payer: COMMERCIAL

## 2017-07-07 VITALS
DIASTOLIC BLOOD PRESSURE: 78 MMHG | OXYGEN SATURATION: 99 % | HEART RATE: 80 BPM | HEIGHT: 58 IN | WEIGHT: 130 LBS | BODY MASS INDEX: 27.29 KG/M2 | TEMPERATURE: 98.3 F | SYSTOLIC BLOOD PRESSURE: 117 MMHG

## 2017-07-07 DIAGNOSIS — O98.419 HEP B COMPLICATING PREGNANCY: ICD-10-CM

## 2017-07-07 DIAGNOSIS — B19.10 HEP B COMPLICATING PREGNANCY: ICD-10-CM

## 2017-07-07 DIAGNOSIS — B18.1 CHRONIC VIRAL HEPATITIS B WITHOUT DELTA AGENT AND WITHOUT COMA (H): ICD-10-CM

## 2017-07-07 DIAGNOSIS — Z34.80 PRENATAL CARE, SUBSEQUENT PREGNANCY, UNSPECIFIED TRIMESTER: ICD-10-CM

## 2017-07-07 DIAGNOSIS — R76.8 RED BLOOD CELL ANTIBODY POSITIVE: ICD-10-CM

## 2017-07-07 DIAGNOSIS — O21.9 NAUSEA/VOMITING IN PREGNANCY: Primary | ICD-10-CM

## 2017-07-07 LAB
ALBUMIN SERPL-MCNC: 3 G/DL (ref 3.4–5)
ALP SERPL-CCNC: 44 U/L (ref 40–150)
ALT SERPL W P-5'-P-CCNC: 19 U/L (ref 0–50)
AST SERPL W P-5'-P-CCNC: 8 U/L (ref 0–45)
BILIRUB DIRECT SERPL-MCNC: <0.1 MG/DL (ref 0–0.2)
BILIRUB SERPL-MCNC: 0.2 MG/DL (ref 0.2–1.3)
PROT SERPL-MCNC: 6.6 G/DL (ref 6.8–8.8)

## 2017-07-07 PROCEDURE — 80076 HEPATIC FUNCTION PANEL: CPT | Performed by: OBSTETRICS & GYNECOLOGY

## 2017-07-07 PROCEDURE — 36415 COLL VENOUS BLD VENIPUNCTURE: CPT | Performed by: OBSTETRICS & GYNECOLOGY

## 2017-07-07 PROCEDURE — 99207 ZZC PRENATAL VISIT: CPT | Performed by: OBSTETRICS & GYNECOLOGY

## 2017-07-07 PROCEDURE — 87517 HEPATITIS B DNA QUANT: CPT | Performed by: OBSTETRICS & GYNECOLOGY

## 2017-07-07 RX ORDER — ONDANSETRON 4 MG/1
4-8 TABLET, ORALLY DISINTEGRATING ORAL EVERY 8 HOURS PRN
Qty: 30 TABLET | Refills: 2 | Status: SHIPPED | OUTPATIENT
Start: 2017-07-07 | End: 2018-02-22

## 2017-07-07 ASSESSMENT — PAIN SCALES - GENERAL: PAINLEVEL: NO PAIN (0)

## 2017-07-07 NOTE — MR AVS SNAPSHOT
"              After Visit Summary   2017    Reji Lockwood    MRN: [id number]           Patient Information     Date Of Birth          1980        Visit Information        Provider Department      2017 2:45 PM Main Norwood MD; [employer] [facility]        Today's Diagnoses     Red blood cell antibody positive           Follow-ups after your visit        Who to contact     If you have questions or need follow up information about today's clinic visit or your schedule please contact [facility] directly at [phone].  Normal or non-critical lab and imaging results will be communicated to you by CS-Keyshart, letter or phone within 4 business days after the clinic has received the results. If you do not hear from us within 7 days, please contact the clinic through [employer]t or phone. If you have a critical or abnormal lab result, we will notify you by phone as soon as possible.  Submit refill requests through [employer] or call your pharmacy and they will forward the refill request to us. Please allow 3 business days for your refill to be completed.          Additional Information About Your Visit        MyChart Information     [employer] lets you send messages to your doctor, view your test results, renew your prescriptions, schedule appointments and more. To sign up, go to [email or web address] . Click on \"Log in\" on the left side of the screen, which will take you to the Welcome page. Then click on \"Sign up Now\" on the right side of the page.     You will be asked to enter the access code listed below, as well as some personal information. Please follow the directions to create your username and password.     Your access code is: [email or web address]  Expires: 2017  3:59 PM     Your access code will  in 90 days. If you need help or a new code, please call your [facility] or [phone].        Care EveryWhere ID     This is your Care EveryWhere " "ID. This could be used by other organizations to access your [place] medical records  [id number]        Your Vitals Were     Pulse Temperature Height Last Period Pulse Oximetry BMI (Body Mass Index)    80 98.3  F (36.8  C) (Oral) 4' 10\" (1.473 m) 03/28/2017 (Within Weeks) 99% 27.17 kg/m2       Blood Pressure from Last 3 Encounters:   07/07/17 117/78   06/19/17 103/67   06/19/17 105/69    Weight from Last 3 Encounters:   07/07/17 130 lb (59 kg)   06/19/17 129 lb (58.5 kg)   06/19/17 128 lb 14.4 oz (58.5 kg)              Today, you had the following     No orders found for display       Primary Care Provider Office Phone # Fax #    Fadia Georgia Serrano [phone]       [facility] [address] [place]  [facility] [address]        Equal Access to Services     PASTOR [facility] SystemAARON : Hadii aad ku hadasho Soomaali, waaxda luqadaha, qaybta kaalmada adeegyada, waxay idiin hayyanira atkins kharabob daily . So [facility] [phone].    ATENCIÓN: Si habla español, tiene a gilliam disposición servicios gratuitos de asistencia lingüística. Llame al [phone].    We comply with applicable federal civil rights laws and [place] laws. We do not discriminate on the basis of race, color, national origin, age, disability sex, sexual orientation or gender identity.            Thank you!     Thank you for choosing [facility]  for your care. Our goal is always to provide you with excellent care. Hearing back from our patients is one way we can continue to improve our services. Please take a few minutes to complete the written survey that you may receive in the mail after your visit with us. Thank you!             Your Updated Medication List - Protect others around you: Learn how to safely use, store and throw away your medicines at www.disposemymeds.org.          This list is accurate as of: 7/7/17  3:00 PM.  Always use your most recent med list.                   Brand Name Dispense " Instructions for use Diagnosis    acetaminophen 500 MG tablet    TYLENOL     Take 500-1,000 mg by mouth        ondansetron 4 MG ODT tab    ZOFRAN-ODT     TK 1 T PO Q 8 H PRN N        prenatal multivitamin  plus iron 27-0.8 MG Tabs per tablet     100 tablet    Take 1 tablet by mouth daily    Pregnancy test positive

## 2017-07-07 NOTE — NURSING NOTE
"Chief Complaint   Patient presents with     Prenatal Care       Initial /78 (BP Location: Left arm, Patient Position: Chair, Cuff Size: Adult Regular)  Pulse 80  Temp 98.3  F (36.8  C) (Oral)  Ht 4' 10\" (1.473 m)  Wt 130 lb (59 kg)  LMP 03/28/2017 (Within Weeks)  SpO2 99%  BMI 27.17 kg/m2 Estimated body mass index is 27.17 kg/(m^2) as calculated from the following:    Height as of this encounter: 4' 10\" (1.473 m).    Weight as of this encounter: 130 lb (59 kg).  Medication Reconciliation: christiano Laureano MA      "

## 2017-07-09 NOTE — PROGRESS NOTES
No signif signs, symptoms or concerns except N/V and GERD. Zofran prescription refilled and advise Zantac. Discussed hep B in pregnancy- follow. Plan MFM follow-up for serial cervix length at 16 weeks and also advise 17P at 20 weeks- past suspected  contractions leading to PPROM and very  delivery. Here with .   Discussed special diagnostic and screening tests and plans (y = yes, n = no/declined, u = uncertain/considering): quad scr = y, survey u/s = n, Level 2 survey u/s with MFM = y, CF carrier scr = n, hemoglobinopathy or thalassemia scr= n, SMA, fragile X  and other genetic scr= n, 1st trimester scr with NT and later AFP or with cell free fetal DNA and later AFP = n, cell free fetal DNA= u, genetic amnio/CVS = n.  Advice appropriate to gestational age reviewed including pertinent Checklist items. Discussed condition, tests and care plan including RBA. Problem list updated. Repeat Ab screen, quad screening and MFM consult next.   A/P:  Reji was seen today for prenatal care.    Diagnoses and all orders for this visit:    Nausea/vomiting in pregnancy  -     ondansetron (ZOFRAN ODT) 4 MG ODT tab; Take 1-2 tablets (4-8 mg) by mouth every 8 hours as needed for nausea    Red blood cell antibody positive    Prenatal care, subsequent pregnancy, unspecified trimester  -     Hepatic panel  -     Hep B Virus DNA Quant Real Time PCR        Main Norwood MD

## 2017-07-11 ENCOUNTER — TELEPHONE (OUTPATIENT)
Dept: OBGYN | Facility: CLINIC | Age: 37
End: 2017-07-11

## 2017-07-11 LAB
HBV DNA SERPL NAA+PROBE-ACNC: ABNORMAL [IU]/ML
HBV DNA SERPL NAA+PROBE-LOG IU: >8.2 {LOG_IU}/ML

## 2017-07-11 NOTE — TELEPHONE ENCOUNTER
Notes Recorded by Main Norwood MD on 2017 at 3:42 PM  Hepatitis B viral load is high and needs attention now with probable treatment to reduce the  transmission risk. Advise GI/Hepatology review with patient's specialist for this or new specialist and I have placed a referral. This consult and treatment assessment or commencement should be in the near future. Please notify patient and arrange.    Phone call placed to patient via [facility]  Aida Vasquez.  left message for patient to call back to clinic and gave RN hours. Ca Virgen RN, BAN

## 2017-07-20 NOTE — TELEPHONE ENCOUNTER
Phone call placed to patient via [facility]  [employer]. Left message for patient to call back to clinic for information from Dr. Norwood.   Next 5 appointments (look out 90 days)     Aug 10, 2017  4:00 PM CDT   ESTABLISHED PRENATAL with Main Norwood MD   [facility]    [address] [place] [address] [phone]                  Will route to Dr. Norwood for review & orders. Unsure if Dr. Norwood wants a letter sent to patient. Ca Virgen RN, BAN

## 2017-07-20 NOTE — TELEPHONE ENCOUNTER
Return call from patient. She stated she would like an . Explained will call back with an .   Phone to patient via [place]  [employer], Rusty. Gave results and orders per Dr. Norwood as below. Pt verbalized understanding and agreed to follow plan. Ca Virgen RN, BAN

## 2017-07-20 NOTE — TELEPHONE ENCOUNTER
Patient did not have a GI/hepatology doctor. Gave phone numbers and patient will call to schedule appt. Ca Virgen RN, BAN

## 2017-07-21 ENCOUNTER — TELEPHONE (OUTPATIENT)
Dept: GASTROENTEROLOGY | Facility: CLINIC | Age: 37
End: 2017-07-21

## 2017-07-21 NOTE — TELEPHONE ENCOUNTER
Attempted to contact the patient by phone with the help of  Services. A message was left informing patient that Dr. Hood would be able to see her on 7/27/17 at 8:40am in [place]. This appointment was scheduled. When the patient returns the call please cancel this appointment if she is unable to make it.    Marilee Basilio CMA

## 2017-07-27 ENCOUNTER — OFFICE VISIT (OUTPATIENT)
Dept: GASTROENTEROLOGY | Facility: CLINIC | Age: 37
End: 2017-07-27
Payer: COMMERCIAL

## 2017-07-27 VITALS
WEIGHT: 132.1 LBS | DIASTOLIC BLOOD PRESSURE: 74 MMHG | BODY MASS INDEX: 27.73 KG/M2 | HEIGHT: 58 IN | SYSTOLIC BLOOD PRESSURE: 112 MMHG | HEART RATE: 73 BPM

## 2017-07-27 DIAGNOSIS — B18.1 CARRIER OF HEPATITIS B (H): Primary | ICD-10-CM

## 2017-07-27 PROCEDURE — 99203 OFFICE O/P NEW LOW 30 MIN: CPT | Performed by: INTERNAL MEDICINE

## 2017-07-27 ASSESSMENT — PAIN SCALES - GENERAL: PAINLEVEL: NO PAIN (0)

## 2017-07-27 NOTE — MR AVS SNAPSHOT
After Visit Summary   7/27/2017    Reji Lockwood    MRN: [id number]           Patient Information     Date Of Birth          1980        Visit Information        Provider Department      7/27/2017 8:30 AM Breana Hood MD; [employer] [facility]        Today's Diagnoses     Carrier of hepatitis B (H)    -  1       Follow-ups after your visit        Your next 10 appointments already scheduled     Aug 10, 2017  4:00 PM CDT   ESTABLISHED PRENATAL with Main Norwood MD   [facility] ([facility])    [address] [facility] [id number] [phone]            Oct 09, 2017  9:00 AM CDT   US ABDOMEN/PELVIS DUPLEX COMPLETE with MGUS1,  US TECH   [facility])    [address] [place] [address] [phone]           Please bring a list of your medicines (including vitamins, minerals and over-the-counter drugs). Also, tell your doctor about any allergies you may have. Wear comfortable clothes and leave your valuables at home.  Adults: No eating or drinking for 8 hours before the exam. You may take medicine with a small sip of water.  Children: - Children 6+ years: No food or drink for 6 hours before exam. - Children 1-5 years: No food or drink for 4 hours before exam. - Infants, breast-fed: may have breast milk up to 2 hours before exam. - Infants, formula: may have bottle until 4 hours before exam.  Please call the Imaging Department at your exam site with any questions.            Oct 09, 2017  9:50 AM CDT   LAB with LAB FIRST FLOOR [facility] ([facility])    [address] [place] [address] [phone]           Patient must bring picture ID. Patient should be prepared to give a urine specimen  Please do not eat 10-12 hours before your appointment if you are coming in fasting for labs on  lipids, cholesterol, or glucose (sugar). Pregnant women should follow their Care Team instructions. Water with medications is okay. Do not drink coffee or other fluids. If you have concerns about taking  your medications, please ask at office or if scheduling via "[employer] send a message by clicking on Secure Messaging, Message Your Care Team.            Oct 26, 2017  3:00 PM CDT   Return Visit with Breana Hood MD   [facility] ([facility])    [address] [place] [address] [phone]              Future tests that were ordered for you today     Open Future Orders        Priority Expected Expires Ordered    UA with Microscopic ([place]; [facility]) Routine  7/27/2018 7/27/2017    CBC with platelets Routine 10/9/2017 7/27/2018 7/27/2017    Basic metabolic panel  (Ca, Cl, CO2, Creat, Gluc, K, Na, BUN) Routine 10/9/2017 7/27/2018 7/27/2017    Hepatic panel (Albumin, ALT, AST, Bili, Alk Phos, TP) Routine 10/9/2017 7/27/2018 7/27/2017    Hep B Virus DNA Quant Real Time PCR Routine 10/9/2017 7/27/2018 7/27/2017    Hepatitis B surface antigen Routine 10/9/2017 7/27/2018 7/27/2017    Hepatitis Be antibody Routine 10/9/2017 7/27/2018 7/27/2017    Hepatitis Be antigen Routine 10/9/2017 7/27/2018 7/27/2017    Hepatitis Antibody A IgG Routine 10/9/2017 7/27/2018 7/27/2017    Hepatitis delta antibody Routine 10/9/2017 7/27/2018 7/27/2017    US Abdomen Complete w Doppler Complete Routine  7/27/2018 7/27/2017            Who to contact     If you have questions or need follow up information about today's clinic visit or your schedule please contact [facility] directly at [phone].  Normal or non-critical lab and imaging results will be communicated to you by MyChart, letter or phone within 4 business days after the clinic has received the results. If you do not hear from us within 7 days, please contact the clinic through MyChart or phone. If  "you have a critical or abnormal lab result, we will notify you by phone as soon as possible.  Submit refill requests through [employer] or call your pharmacy and they will forward the refill request to us. Please allow 3 business days for your refill to be completed.          Additional Information About Your Visit        [employer] Information     [employer] is an electronic gateway that provides easy, online access to your medical records. With Truecall[employer], you can request a clinic appointment, read your test results, renew a prescription or communicate with your care team.     To sign up for [employer] visit the website at [email or web address]   You will be asked to enter the access code listed below, as well as some personal information. Please follow the directions to create your username and password.     Your access code is: [email or web address]  Expires: 2017  3:59 PM     Your access code will  in 90 days. If you need help or a new code, please contact your [facility] or call [phone] for assistance.        Care EveryWhere ID     This is your Care EveryWhere ID. This could be used by other organizations to access your [place] medical records  [id number]        Your Vitals Were     Pulse Height Last Period BMI (Body Mass Index)          73 1.473 m (4' 10\") 2017 (Within Weeks) 27.61 kg/m2         Blood Pressure from Last 3 Encounters:   17 112/74   17 117/78   17 103/67    Weight from Last 3 Encounters:   17 59.9 kg (132 lb 1.6 oz)   17 59 kg (130 lb)   17 58.5 kg (129 lb)               Primary Care Provider Office Phone # Fax #    Fadia Georgia Serrano [phone]       [facility] [address]  [facility] [address]        Equal Access to Services     TERRENCE MCCORMICK : Ventura rios Soomaali, waaxda luqadaha, qaybta kaalmada adecarlotayaangela, ana salvador. So [facility] " [phone].    ATENCIÓN: Si mayur hamilton, tiene a gilliam disposición servicios gratuitos de asistencia lingüística. Deborah quispe [phone].    We comply with applicable federal civil rights laws and [place] laws. We do not discriminate on the basis of race, color, national origin, age, disability sex, sexual orientation or gender identity.            Thank you!     Thank you for choosing [facility]  for your care. Our goal is always to provide you with excellent care. Hearing back from our patients is one way we can continue to improve our services. Please take a few minutes to complete the written survey that you may receive in the mail after your visit with us. Thank you!             Your Updated Medication List - Protect others around you: Learn how to safely use, store and throw away your medicines at www.disposemymeds.org.          This list is accurate as of: 7/27/17 10:00 AM.  Always use your most recent med list.                   Brand Name Dispense Instructions for use Diagnosis    acetaminophen 500 MG tablet    TYLENOL     Take 500-1,000 mg by mouth        ondansetron 4 MG ODT tab    ZOFRAN ODT    30 tablet    Take 1-2 tablets (4-8 mg) by mouth every 8 hours as needed for nausea    Nausea/vomiting in pregnancy       prenatal multivitamin  plus iron 27-0.8 MG Tabs per tablet     100 tablet    Take 1 tablet by mouth daily    Pregnancy test positive

## 2017-07-27 NOTE — NURSING NOTE
"Reji Lockwood's goals for this visit include:   Chief Complaint   Patient presents with     Consult     Hep B in pregnancy     She requests these members of her care team be copied on today's visit information: YES - PCP     Initial /74 (BP Location: Left arm, Patient Position: Chair, Cuff Size: Adult Regular)  Pulse 73  Ht 1.473 m (4' 10\")  Wt 59.9 kg (132 lb 1.6 oz)  LMP 03/28/2017 (Within Weeks)  BMI 27.61 kg/m2 Estimated body mass index is 27.61 kg/(m^2) as calculated from the following:    Height as of this encounter: 1.473 m (4' 10\").    Weight as of this encounter: 59.9 kg (132 lb 1.6 oz).  BP completed using cuff size: regular        "

## 2017-07-27 NOTE — PROGRESS NOTES
Hepatology Clinic note  Reji Lockwood   Date of Birth 1980  Date of Service 7/27/2017  Reason for consult: Chronic HBV infection  Requesting provider: Dr. Main Norwood.          Impression and plan:   Reji Lockwood is a 37 year old woman without significant medical history except for chronic HBV without signs of advanced liver disease.   She is ~ 15 weeks pregnant and was noted to be positive for HBV. Referred for evaluation.     She has normal liver enzymes and synthetic function, while viral load is > 170,000,000. She's immune tolerant, without clear signs of advanced liver disease or fibrosis.     Generally, the risk of vertical transmission, mother to infant is increased with the level of viral replication. To minimize this risk Reji will likely need to be on antivirals, Tenofovir, but the decision is delayed until the 3rd trimester.   - I suggest we check labs at that time (mid-late October), to assess viral replication, and liver health: HBV eAb, eAg, HDV, HBV sAg, DNA level, as well as immune status against HAV.   - will check abd US with doppler.   - regardless of her status, the infact would need HBIG, and HBV vaccine at time of birth.  - otherwise, no change to the plan.     RTC in ~ 12 weeks or sooner as needed    Breana Hood MD  [facility] Hepatology [facility]    -----------------------------------------------------       HPI:   Reji Lockwood is a 37 year old female without significant medical history except for chronic HBV without signs of advanced liver disease.   She is ~ 15 weeks pregnant and was noted to be positive for HBV. Referred for evaluation.     Briefly, she initially found out about the virus 8 years ago with her 1st pregnancy but doesn't recall been told follow up was needed. Has not been on treatment before, and denies history of acute hepatitis or jaundice. She did not require treatment during her 1st pregnancy, but her infant was vaccinated and is virus free per her report.  "  She denies family history of chronic liver disease, HCC or death related to cirrhosis complications.     Otherwise, denies chest pain, shortness of breath, abdominal pain, nausea, vomiting fevers, chills, bleeding, jaundice, confusion, falls, rash, pruritis, leg swelling or abdominal distention. Has stable weight, appetite and bowel habits.           Past Medical History:     Past Medical History:   Diagnosis Date     Chronic viral hepatitis B without delta agent and without coma (H) 2010            Past Surgical History:   none         Medications:     Current Outpatient Prescriptions   Medication     ondansetron (ZOFRAN ODT) 4 MG ODT tab     acetaminophen (TYLENOL) 500 MG tablet     Prenatal Vit-Fe Fumarate-FA (PRENATAL MULTIVITAMIN  PLUS IRON) 27-0.8 MG TABS per tablet     No current facility-administered medications for this visit.             Allergies:     Allergies   Allergen Reactions     Blood Transfusion Related (Informational Only) Other (See Comments)     Patient has a history of a clinically significant antibody against RBC antigens.  A delay in compatible RBCs may occur.     Fish Allergy Hives            Social History:      reports that she has never smoked. She has never used smokeless tobacco. She reports that she does not drink alcohol or use illicit drugs.   reports that she currently engages in sexual activity and has had male partners. She reports using the following method of birth control/protection: None.           Family History:     Family History   Problem Relation Age of Onset     KIDNEY DISEASE Mother      Dead     Unknown/Adopted Father      Sister with HBV discovered          Review of Systems:   10 points ROS was obtained and highlighted in the HPI, otherwise negative.          Physical Exam:   VS:  /74 (BP Location: Left arm, Patient Position: Chair, Cuff Size: Adult Regular)  Pulse 73  Ht 1.473 m (4' 10\")  Wt 59.9 kg (132 lb 1.6 oz)  LMP 03/28/2017 (Within Weeks)  BMI " 27.61 kg/m2      Gen: A&Ox3, NAD  HEENT: non-icteric, oropharynx clear  CV: RRR  Lung: CTAB  Abd: soft, NT, ND, appropriately gravid  Ext: trace edema, intact pulses.   Skin: no stigmata of chronic liver disease.   Neuro: no focal deficits, grossly intact, no asterixis   Psych: appropriate mood and affects         Data:   Reviewed in person and significant for:  Lab Results   Component Value Date    WBC 8.7 06/09/2017     Lab Results   Component Value Date    RBC 4.46 06/09/2017     Lab Results   Component Value Date    HGB 13.0 06/09/2017     Lab Results   Component Value Date    HCT 38.9 06/09/2017     No components found for: MCT  Lab Results   Component Value Date    MCV 87 06/09/2017     Lab Results   Component Value Date    MCH 29.1 06/09/2017     Lab Results   Component Value Date    MCHC 33.4 06/09/2017     Lab Results   Component Value Date    RDW 13.3 06/09/2017     Lab Results   Component Value Date     06/09/2017     Last Basic Metabolic Panel:  No results found for: NA   No results found for: POTASSIUM  No results found for: CHLORIDE  No results found for: DIANNE  No results found for: CO2  No results found for: BUN  No results found for: CR  No results found for: GLC  Liver Function Studies -   Recent Labs   Lab Test  07/07/17   1633   PROTTOTAL  6.6*   ALBUMIN  3.0*   BILITOTAL  0.2   ALKPHOS  44   AST  8   ALT  19

## 2017-08-10 ENCOUNTER — PRENATAL OFFICE VISIT (OUTPATIENT)
Dept: OBGYN | Facility: CLINIC | Age: 37
End: 2017-08-10
Payer: COMMERCIAL

## 2017-08-10 VITALS
WEIGHT: 135 LBS | SYSTOLIC BLOOD PRESSURE: 112 MMHG | HEART RATE: 83 BPM | DIASTOLIC BLOOD PRESSURE: 67 MMHG | BODY MASS INDEX: 28.22 KG/M2

## 2017-08-10 DIAGNOSIS — B18.1 CHRONIC VIRAL HEPATITIS B WITHOUT DELTA AGENT AND WITHOUT COMA (H): ICD-10-CM

## 2017-08-10 DIAGNOSIS — O09.522 AMA (ADVANCED MATERNAL AGE) MULTIGRAVIDA 35+, SECOND TRIMESTER: ICD-10-CM

## 2017-08-10 DIAGNOSIS — O09.92 SUPERVISION OF HIGH RISK PREGNANCY, ANTEPARTUM, SECOND TRIMESTER: ICD-10-CM

## 2017-08-10 DIAGNOSIS — R76.8 RED BLOOD CELL ANTIBODY POSITIVE: ICD-10-CM

## 2017-08-10 PROCEDURE — 81511 FTL CGEN ABNOR FOUR ANAL: CPT | Mod: 90 | Performed by: OBSTETRICS & GYNECOLOGY

## 2017-08-10 PROCEDURE — 86850 RBC ANTIBODY SCREEN: CPT | Performed by: OBSTETRICS & GYNECOLOGY

## 2017-08-10 PROCEDURE — 36415 COLL VENOUS BLD VENIPUNCTURE: CPT | Performed by: OBSTETRICS & GYNECOLOGY

## 2017-08-10 PROCEDURE — 99207 ZZC PRENATAL VISIT: CPT | Performed by: OBSTETRICS & GYNECOLOGY

## 2017-08-10 PROCEDURE — 99000 SPECIMEN HANDLING OFFICE-LAB: CPT | Performed by: OBSTETRICS & GYNECOLOGY

## 2017-08-10 NOTE — NURSING NOTE
"Chief Complaint   Patient presents with     Prenatal Care     OBV 17w3d       Initial /67 (BP Location: Left arm, Patient Position: Chair, Cuff Size: Adult Regular)  Pulse 83  Wt 135 lb (61.2 kg)  LMP 03/28/2017 (Within Weeks)  Breastfeeding? No  BMI 28.22 kg/m2 Estimated body mass index is 28.22 kg/(m^2) as calculated from the following:    Height as of 7/27/17: 4' 10\" (1.473 m).    Weight as of this encounter: 135 lb (61.2 kg).  Medication Reconciliation: complete   Kezia Crump CMA      "

## 2017-08-10 NOTE — Clinical Note
Please arrange MFM consult with cervical evaluation and Level 2 u/s with MFM at MG site if possible. This is for history of  delivery and AMA. I will sign the paper referral order later as needed. The quad screening is pending.

## 2017-08-10 NOTE — LETTER
August 14, 2017        Reji Lockwood  [address] [place] [address]        Dear Reji,     Your recent Maternal Quad Screen is Ok- normal, stable and reassuring- continue with same treatment and plan as discussed.      Sincerely,        Main Norwood MD

## 2017-08-10 NOTE — MR AVS SNAPSHOT
After Visit Summary   8/10/2017    Reji Lockwood    MRN: [id number]           Patient Information     Date Of Birth          1980        Visit Information        Provider Department      8/10/2017 3:45 PM Main Norwood MD; [employer] [facility]        Today's Diagnoses     Red blood cell antibody positive        Supervision of high risk pregnancy, antepartum, second trimester        AMA (advanced maternal age) multigravida 35+, second trimester          Care Instructions                                                        If you have any questions regarding your visit, Please contact your care team.     [employer]: [phone]    [facility] CLINIC HOURS TELEPHONE NUMBER   Main Norwood M.D.      Cristal-    Maddie Penaloza-RN    Kezia-Medical Assistant   Monday-[place]  8:00a.m-4:45 p.m  Wednesday-[place] 8:00a.m-4:45 p.m.  Thursday-[place]  8:00a.m-4:45 p.m.  Friday-[place]  8:00a.m-4:45 p.m. [facility]  [address] [place] [address]  [phone] ask [facility]  [phone] Fax  Imaging [phone]    [facility] Labor and Delivery  [address] [place] [address] [place] [address]  [phone]    [facility]  [address] [facility] [place] [address]  [phone] [facility]  [phone] Fax  Imaging [phone]     Urgent Care locations:    [facility] Monday-Friday  5 pm - 9 pm  Saturday and Sunday   9 am - 5 pm    Monday-Friday   11 am - 9 pm  Saturday and Sunday   9 am - 5 pm   [phone]       If you need a medication refill, please contact your pharmacy. Please allow 3 business days for your refill to be completed.  As always, Thank you for trusting us with your healthcare needs!            Follow-ups after your visit        Your next 10 appointments already  scheduled     Oct 09, 2017  9:00 AM CDT   US ABDOMEN/PELVIS DUPLEX COMPLETE with MGUS1, MG US TECH   [facility] ([facility])    [address] [address] [address] [phone]           Please bring a list of your medicines (including vitamins, minerals and over-the-counter drugs). Also, tell your doctor about any allergies you may have. Wear comfortable clothes and leave your valuables at home.  Adults: No eating or drinking for 8 hours before the exam. You may take medicine with a small sip of water.  Children: - Children 6+ years: No food or drink for 6 hours before exam. - Children 1-5 years: No food or drink for 4 hours before exam. - Infants, breast-fed: may have breast milk up to 2 hours before exam. - Infants, formula: may have bottle until 4 hours before exam.  Please call the Imaging Department at your exam site with any questions.            Oct 09, 2017  9:50 AM CDT   LAB with LAB FIRST FLOOR [facility] ([facility])    [address] [facility] [address] [phone]           Patient must bring picture ID. Patient should be prepared to give a urine specimen  Please do not eat 10-12 hours before your appointment if you are coming in fasting for labs on lipids, cholesterol, or glucose (sugar). Pregnant women should follow their Care Team instructions. Water with medications is okay. Do not drink coffee or other fluids. If you have concerns about taking  your medications, please ask at office or if scheduling via [employer], send a message by clicking on Secure Messaging, Message Your Care Team.            Oct 26, 2017  3:00 PM CDT   Return Visit with Breana Hood MD   [facility] ([facility])    [address] [facility] [address] [phone]              Who to contact     If you have questions or need follow up information about today's clinic visit or  "your schedule please contact [facility] directly at [phone].  Normal or non-critical lab and imaging results will be communicated to you by MyChart, letter or phone within 4 business days after the clinic has received the results. If you do not hear from us within 7 days, please contact the clinic through [employer] or phone. If you have a critical or abnormal lab result, we will notify you by phone as soon as possible.  Submit refill requests through "[employer] or call your pharmacy and they will forward the refill request to us. Please allow 3 business days for your refill to be completed.          Additional Information About Your Visit        [employer] SoftwareYale New Haven Children's HospitalI2C Technologies Information     "[employer] lets you send messages to your doctor, view your test results, renew your prescriptions, schedule appointments and more. To sign up, go to [email or web address] . Click on \"Log in\" on the left side of the screen, which will take you to the Welcome page. Then click on \"Sign up Now\" on the right side of the page.     You will be asked to enter the access code listed below, as well as some personal information. Please follow the directions to create your username and password.     Your access code is: [email or web address]  Expires: 2017  3:59 PM     Your access code will  in 90 days. If you need help or a new code, please call your [place] clinic or [phone].        Care EveryWhere ID     This is your Care EveryWhere ID. This could be used by other organizations to access your [place] medical records  [id number]        Your Vitals Were     Pulse Last Period Breastfeeding? BMI (Body Mass Index)          83 2017 (Within Weeks) No 28.22 kg/m2         Blood Pressure from Last 3 Encounters:   08/10/17 112/67   17 112/74   17 117/78    Weight from Last 3 Encounters:   08/10/17 135 lb (61.2 kg)   17 132 lb 1.6 oz (59.9 kg)   17 130 lb (59 kg)              Today, you had the following     No " orders found for display       Primary Care Provider Office Phone # Fax #    Fadia Georgiafransisco Serrano -[phone]       [address] [place]  [facility] [address]        Equal Access to Services     TERRENCE MCCORMICK : Hadii aad ku hadasho Soomaali, waaxda luqadaha, qaybta kaalmada adeegyada, waxay idiin hayaan adecarlota whitaker killian . So [facility] [phone].    ATENCIÓN: Si habla español, tiene a gilliam disposición servicios gratuitos de asistencia lingüística. Llame al [phone].    We comply with applicable federal civil rights laws and [place] laws. We do not discriminate on the basis of race, color, national origin, age, disability sex, sexual orientation or gender identity.            Thank you!     Thank you for choosing [facility]  for your care. Our goal is always to provide you with excellent care. Hearing back from our patients is one way we can continue to improve our services. Please take a few minutes to complete the written survey that you may receive in the mail after your visit with us. Thank you!             Your Updated Medication List - Protect others around you: Learn how to safely use, store and throw away your medicines at www.disposemymeds.org.          This list is accurate as of: 8/10/17  4:03 PM.  Always use your most recent med list.                   Brand Name Dispense Instructions for use Diagnosis    acetaminophen 500 MG tablet    TYLENOL     Take 500-1,000 mg by mouth        ondansetron 4 MG ODT tab    ZOFRAN ODT    30 tablet    Take 1-2 tablets (4-8 mg) by mouth every 8 hours as needed for nausea    Nausea/vomiting in pregnancy       prenatal multivitamin plus iron 27-0.8 MG Tabs per tablet     100 tablet    Take 1 tablet by mouth daily    Pregnancy test positive

## 2017-08-10 NOTE — PATIENT INSTRUCTIONS
If you have any questions regarding your visit, Please contact your care team.     [facility]: [phone]    [facility] CLINIC HOURS TELEPHONE NUMBER   ANN Zaragoza-    Maddie Penaloza-NAYA Mast-Medical Assistant   Monday-[place]  8:00a.m-4:45 p.m  Wednesday-[place] 8:00a.m-4:45 p.m.  Thursday-[place]  8:00a.m-4:45 p.m.  Friday-[place]  8:00a.m-4:45 p.m. [facility]  [address]  [place], [place] [address] [phone] ask [facility]  [phone] Fax  Imaging [phone]    [facility]  [address] [place] [address] Dr.  [place], [place] [id number] [phone]    [facility]  [address] [place] [address]  [phone] ask [facility]  [phone] Fax  Imaging [phone]     Urgent Care locations:    [place]        [place] Monday-Friday  5 pm - 9 pm  Saturday and Sunday   9 am - 5 pm    Monday-Friday   11 am - 9 pm  Saturday and Sunday   9 am - 5 pm   [phone]       If you need a medication refill, please contact your pharmacy. Please allow 3 business days for your refill to be completed.  As always, Thank you for trusting us with your healthcare needs!

## 2017-08-11 ENCOUNTER — MEDICAL CORRESPONDENCE (OUTPATIENT)
Dept: HEALTH INFORMATION MANAGEMENT | Facility: CLINIC | Age: 37
End: 2017-08-11

## 2017-08-11 LAB — BLD GP AB SCN SERPL QL: NORMAL

## 2017-08-11 NOTE — PROGRESS NOTES
No signif signs, symptoms or concerns. Feeling better. Had Hepatology consult and follow-up planned. Did not see MFM yet- will arrange Level 2 u/s. Here with . Advice appropriate to gestational age reviewed including pertinent Checklist items. Discussed condition, tests and care plan including RBA. Problem list updated.   A/P:  Reji was seen today for prenatal care.    Diagnoses and all orders for this visit:    Red blood cell antibody positive  -     Antibody screen red cell    Supervision of high risk pregnancy, antepartum, second trimester  -     Antibody screen red cell  -     Maternal quad screen    AMA (advanced maternal age) multigravida 35+, second trimester  -     Maternal quad screen    Chronic viral hepatitis B without delta agent and without coma (H)        Main Norwood MD

## 2017-08-13 PROBLEM — R76.8 RED BLOOD CELL ANTIBODY POSITIVE: Status: RESOLVED | Noted: 2017-06-13 | Resolved: 2017-08-13

## 2017-09-08 ENCOUNTER — TELEPHONE (OUTPATIENT)
Dept: OBGYN | Facility: CLINIC | Age: 37
End: 2017-09-08

## 2017-09-08 DIAGNOSIS — O09.522 AMA (ADVANCED MATERNAL AGE) MULTIGRAVIDA 35+, SECOND TRIMESTER: ICD-10-CM

## 2017-09-08 DIAGNOSIS — O09.92 SUPERVISION OF HIGH RISK PREGNANCY, ANTEPARTUM, SECOND TRIMESTER: ICD-10-CM

## 2017-09-08 NOTE — TELEPHONE ENCOUNTER
Need to recalculate quad screen using JESSICA of 1-.   It currently is resulting form an ECC of 1/25/2018. I spoke with Danielle at [facility] genetic counseling who requested it.  She did tell me that patient is scheduled to get a cerclage next week.  Please advise if this should be called to lab for recalculating.  Vianey Perry RN

## 2017-09-10 ENCOUNTER — ANESTHESIA EVENT (OUTPATIENT)
Dept: OBGYN | Facility: CLINIC | Age: 37
End: 2017-09-10
Payer: COMMERCIAL

## 2017-09-10 NOTE — TELEPHONE ENCOUNTER
I agree. It appears that the Lab entered the JESSICA incorrectly. It should be 1/15/2018 based on the early u/s but it was entered as 1/25/2018 at some point and should be corrected and the report amended. Please notify Lab.    Main Norwood MD

## 2017-09-11 ENCOUNTER — TELEPHONE (OUTPATIENT)
Dept: OBGYN | Facility: CLINIC | Age: 37
End: 2017-09-11

## 2017-09-11 ENCOUNTER — SURGERY (OUTPATIENT)
Age: 37
End: 2017-09-11

## 2017-09-11 ENCOUNTER — ANESTHESIA (OUTPATIENT)
Dept: OBGYN | Facility: CLINIC | Age: 37
End: 2017-09-11
Payer: COMMERCIAL

## 2017-09-11 ENCOUNTER — HOSPITAL ENCOUNTER (OUTPATIENT)
Facility: CLINIC | Age: 37
Discharge: HOME | End: 2017-09-11
Attending: OBSTETRICS & GYNECOLOGY | Admitting: OBSTETRICS & GYNECOLOGY
Payer: COMMERCIAL

## 2017-09-11 ENCOUNTER — OFFICE VISIT (OUTPATIENT)
Dept: INTERPRETER SERVICES | Facility: CLINIC | Age: 37
End: 2017-09-11

## 2017-09-11 VITALS
WEIGHT: 134 LBS | HEIGHT: 59 IN | OXYGEN SATURATION: 97 % | HEART RATE: 71 BPM | DIASTOLIC BLOOD PRESSURE: 69 MMHG | SYSTOLIC BLOOD PRESSURE: 130 MMHG | BODY MASS INDEX: 27.01 KG/M2 | RESPIRATION RATE: 18 BRPM | TEMPERATURE: 98.4 F

## 2017-09-11 DIAGNOSIS — O09.892 HISTORY OF PRETERM DELIVERY, CURRENTLY PREGNANT IN SECOND TRIMESTER: ICD-10-CM

## 2017-09-11 DIAGNOSIS — O34.32 CERVICAL INSUFFICIENCY DURING PREGNANCY, ANTEPARTUM, SECOND TRIMESTER: Primary | ICD-10-CM

## 2017-09-11 DIAGNOSIS — O09.92 SUPERVISION OF HIGH RISK PREGNANCY, ANTEPARTUM, SECOND TRIMESTER: ICD-10-CM

## 2017-09-11 PROBLEM — O34.30 CERVICAL INSUFFICIENCY DURING PREGNANCY, ANTEPARTUM: Status: ACTIVE | Noted: 2017-09-11

## 2017-09-11 LAB
# FETUSES US: NORMAL
ABO + RH BLD: NORMAL
ABO + RH BLD: NORMAL
AFP ADJ MOM AMN: 1.05
AFP SERPL-MCNC: 45 NG/ML
AGE - REPORTED: 35.5 YR
BLD GP AB SCN SERPL QL: NORMAL
BLOOD BANK CMNT PATIENT-IMP: NORMAL
DATING METHOD: NORMAL
DIABETIC AT CONCEPTION: NO
ERYTHROCYTE [DISTWIDTH] IN BLOOD BY AUTOMATED COUNT: 12.8 % (ref 10–15)
FAMILY MEMBER DISEASES HX: NO
FAMILY MEMBER DISEASES HX: NO
GA METHOD: NORMAL
GA: 17.43 WEEKS
HCG MOM SERPL: 1.18
HCG SERPL-ACNC: NORMAL IU/L
HCT VFR BLD AUTO: 39.1 % (ref 35–47)
HGB BLD-MCNC: 13.4 G/DL (ref 11.7–15.7)
HX OF HEREDITARY DISORDERS: NO
IDDM PATIENT QL: NO
INHIBIN A MOM SERPL: 1.31
INHIBIN A SERPL-MCNC: 216 PG/ML
INTEGRATED SCN PATIENT-IMP: NORMAL
LMP START DATE: NORMAL
MCH RBC QN AUTO: 30.3 PG (ref 26.5–33)
MCHC RBC AUTO-ENTMCNC: 34.3 G/DL (ref 31.5–36.5)
MCV RBC AUTO: 89 FL (ref 78–100)
PATHOLOGY STUDY: NORMAL
PLATELET # BLD AUTO: 179 10E9/L (ref 150–450)
PREV HX CHROMOSOME ABNORMALITY: NO
RBC # BLD AUTO: 4.42 10E12/L (ref 3.8–5.2)
SPECIMEN DRAWN SERPL: NORMAL
SPECIMEN EXP DATE BLD: NORMAL
SPECIMEN SOURCE: NORMAL
TWINS: NORMAL
U ESTRIOL MOM SERPL: 0.61
U ESTRIOL SERPL-MCNC: 0.8 NG/ML
WBC # BLD AUTO: 9 10E9/L (ref 4–11)
WET PREP SPEC: NORMAL

## 2017-09-11 PROCEDURE — 40000170 ZZH STATISTIC PRE-PROCEDURE ASSESSMENT II: Performed by: OBSTETRICS & GYNECOLOGY

## 2017-09-11 PROCEDURE — 25000128 H RX IP 250 OP 636: Performed by: STUDENT IN AN ORGANIZED HEALTH CARE EDUCATION/TRAINING PROGRAM

## 2017-09-11 PROCEDURE — 71000014 ZZH RECOVERY PHASE 1 LEVEL 2 FIRST HR: Performed by: OBSTETRICS & GYNECOLOGY

## 2017-09-11 PROCEDURE — 25000132 ZZH RX MED GY IP 250 OP 250 PS 637: Performed by: OBSTETRICS & GYNECOLOGY

## 2017-09-11 PROCEDURE — 86850 RBC ANTIBODY SCREEN: CPT | Performed by: OBSTETRICS & GYNECOLOGY

## 2017-09-11 PROCEDURE — 25000125 ZZHC RX 250: Performed by: STUDENT IN AN ORGANIZED HEALTH CARE EDUCATION/TRAINING PROGRAM

## 2017-09-11 PROCEDURE — 36000047 ZZH SURGERY LEVEL 1 EA 15 ADDTL MIN - UMMC: Performed by: OBSTETRICS & GYNECOLOGY

## 2017-09-11 PROCEDURE — 36000045 ZZH SURGERY LEVEL 1 1ST 30 MIN - UMMC: Performed by: OBSTETRICS & GYNECOLOGY

## 2017-09-11 PROCEDURE — 71000015 ZZH RECOVERY PHASE 1 LEVEL 2 EA ADDTL HR: Performed by: OBSTETRICS & GYNECOLOGY

## 2017-09-11 PROCEDURE — 86900 BLOOD TYPING SEROLOGIC ABO: CPT | Performed by: OBSTETRICS & GYNECOLOGY

## 2017-09-11 PROCEDURE — T1013 SIGN LANG/ORAL INTERPRETER: HCPCS | Mod: U3

## 2017-09-11 PROCEDURE — 37000008 ZZH ANESTHESIA TECHNICAL FEE, 1ST 30 MIN: Performed by: OBSTETRICS & GYNECOLOGY

## 2017-09-11 PROCEDURE — 37000009 ZZH ANESTHESIA TECHNICAL FEE, EACH ADDTL 15 MIN: Performed by: OBSTETRICS & GYNECOLOGY

## 2017-09-11 PROCEDURE — 86901 BLOOD TYPING SEROLOGIC RH(D): CPT | Performed by: OBSTETRICS & GYNECOLOGY

## 2017-09-11 PROCEDURE — 85027 COMPLETE CBC AUTOMATED: CPT | Performed by: ANESTHESIOLOGY

## 2017-09-11 PROCEDURE — 25000128 H RX IP 250 OP 636: Performed by: OBSTETRICS & GYNECOLOGY

## 2017-09-11 PROCEDURE — 87210 SMEAR WET MOUNT SALINE/INK: CPT | Performed by: OBSTETRICS & GYNECOLOGY

## 2017-09-11 PROCEDURE — 27210794 ZZH OR GENERAL SUPPLY STERILE: Performed by: OBSTETRICS & GYNECOLOGY

## 2017-09-11 RX ORDER — HYDROXYPROGESTERONE CAPROATE 250 MG/ML
250 INJECTION INTRAMUSCULAR
Qty: 5 ML | Refills: 3
Start: 2017-09-11 | End: 2018-02-22

## 2017-09-11 RX ORDER — ONDANSETRON 4 MG/1
4 TABLET, ORALLY DISINTEGRATING ORAL EVERY 30 MIN PRN
Status: DISCONTINUED | OUTPATIENT
Start: 2017-09-11 | End: 2017-09-11 | Stop reason: HOSPADM

## 2017-09-11 RX ORDER — BUPIVACAINE HYDROCHLORIDE 7.5 MG/ML
1.2 INJECTION, SOLUTION EPIDURAL; RETROBULBAR ONCE
Status: COMPLETED | OUTPATIENT
Start: 2017-09-11 | End: 2017-09-11

## 2017-09-11 RX ORDER — ACETAMINOPHEN 325 MG/1
650 TABLET ORAL EVERY 4 HOURS PRN
Status: DISCONTINUED | OUTPATIENT
Start: 2017-09-11 | End: 2017-09-11 | Stop reason: HOSPADM

## 2017-09-11 RX ORDER — SODIUM CHLORIDE, SODIUM LACTATE, POTASSIUM CHLORIDE, CALCIUM CHLORIDE 600; 310; 30; 20 MG/100ML; MG/100ML; MG/100ML; MG/100ML
INJECTION, SOLUTION INTRAVENOUS CONTINUOUS
Status: DISCONTINUED | OUTPATIENT
Start: 2017-09-11 | End: 2017-09-11 | Stop reason: HOSPADM

## 2017-09-11 RX ORDER — ONDANSETRON 2 MG/ML
INJECTION INTRAMUSCULAR; INTRAVENOUS PRN
Status: DISCONTINUED | OUTPATIENT
Start: 2017-09-11 | End: 2017-09-11

## 2017-09-11 RX ORDER — SODIUM CHLORIDE, SODIUM LACTATE, POTASSIUM CHLORIDE, CALCIUM CHLORIDE 600; 310; 30; 20 MG/100ML; MG/100ML; MG/100ML; MG/100ML
INJECTION, SOLUTION INTRAVENOUS CONTINUOUS PRN
Status: DISCONTINUED | OUTPATIENT
Start: 2017-09-11 | End: 2017-09-11

## 2017-09-11 RX ORDER — NALBUPHINE HYDROCHLORIDE 10 MG/ML
2.5-5 INJECTION, SOLUTION INTRAMUSCULAR; INTRAVENOUS; SUBCUTANEOUS EVERY 6 HOURS PRN
Status: DISCONTINUED | OUTPATIENT
Start: 2017-09-11 | End: 2017-09-11 | Stop reason: HOSPADM

## 2017-09-11 RX ORDER — ASPIRIN 81 MG
100 TABLET, DELAYED RELEASE (ENTERIC COATED) ORAL DAILY
Qty: 60 TABLET | Refills: 1 | Status: SHIPPED | OUTPATIENT
Start: 2017-09-11 | End: 2018-02-22

## 2017-09-11 RX ORDER — LIDOCAINE 40 MG/G
CREAM TOPICAL
Status: DISCONTINUED | OUTPATIENT
Start: 2017-09-11 | End: 2017-09-11 | Stop reason: HOSPADM

## 2017-09-11 RX ORDER — EPHEDRINE SULFATE 50 MG/ML
5 INJECTION, SOLUTION INTRAMUSCULAR; INTRAVENOUS; SUBCUTANEOUS
Status: DISCONTINUED | OUTPATIENT
Start: 2017-09-11 | End: 2017-09-11 | Stop reason: HOSPADM

## 2017-09-11 RX ORDER — INDOMETHACIN 50 MG/1
50 CAPSULE ORAL ONCE
Status: COMPLETED | OUTPATIENT
Start: 2017-09-11 | End: 2017-09-11

## 2017-09-11 RX ORDER — MEPERIDINE HYDROCHLORIDE 25 MG/ML
12.5 INJECTION INTRAMUSCULAR; INTRAVENOUS; SUBCUTANEOUS
Status: DISCONTINUED | OUTPATIENT
Start: 2017-09-11 | End: 2017-09-11 | Stop reason: HOSPADM

## 2017-09-11 RX ORDER — NALOXONE HYDROCHLORIDE 0.4 MG/ML
.1-.4 INJECTION, SOLUTION INTRAMUSCULAR; INTRAVENOUS; SUBCUTANEOUS
Status: DISCONTINUED | OUTPATIENT
Start: 2017-09-11 | End: 2017-09-11 | Stop reason: HOSPADM

## 2017-09-11 RX ORDER — CITRIC ACID/SODIUM CITRATE 334-500MG
30 SOLUTION, ORAL ORAL ONCE
Status: COMPLETED | OUTPATIENT
Start: 2017-09-11 | End: 2017-09-11

## 2017-09-11 RX ORDER — BUPIVACAINE HYDROCHLORIDE 7.5 MG/ML
INJECTION, SOLUTION INTRASPINAL PRN
Status: DISCONTINUED | OUTPATIENT
Start: 2017-09-11 | End: 2017-09-11

## 2017-09-11 RX ORDER — ACETAMINOPHEN 325 MG/1
975 TABLET ORAL ONCE
Status: COMPLETED | OUTPATIENT
Start: 2017-09-11 | End: 2017-09-11

## 2017-09-11 RX ORDER — ACETAMINOPHEN 325 MG/1
650 TABLET ORAL EVERY 4 HOURS PRN
Qty: 100 TABLET | Refills: 0 | Status: SHIPPED | OUTPATIENT
Start: 2017-09-11 | End: 2018-02-22

## 2017-09-11 RX ORDER — ONDANSETRON 2 MG/ML
4 INJECTION INTRAMUSCULAR; INTRAVENOUS EVERY 30 MIN PRN
Status: DISCONTINUED | OUTPATIENT
Start: 2017-09-11 | End: 2017-09-11 | Stop reason: HOSPADM

## 2017-09-11 RX ADMIN — ONDANSETRON 4 MG: 2 INJECTION INTRAMUSCULAR; INTRAVENOUS at 13:01

## 2017-09-11 RX ADMIN — INDOMETHACIN 50 MG: 50 CAPSULE ORAL at 14:20

## 2017-09-11 RX ADMIN — BUPIVACAINE HYDROCHLORIDE 9 MG: 7.5 INJECTION, SOLUTION EPIDURAL; RETROBULBAR at 12:30

## 2017-09-11 RX ADMIN — ACETAMINOPHEN 650 MG: 325 TABLET, FILM COATED ORAL at 18:09

## 2017-09-11 RX ADMIN — SODIUM CHLORIDE, POTASSIUM CHLORIDE, SODIUM LACTATE AND CALCIUM CHLORIDE: 600; 310; 30; 20 INJECTION, SOLUTION INTRAVENOUS at 13:35

## 2017-09-11 RX ADMIN — SODIUM CHLORIDE, POTASSIUM CHLORIDE, SODIUM LACTATE AND CALCIUM CHLORIDE: 600; 310; 30; 20 INJECTION, SOLUTION INTRAVENOUS at 12:01

## 2017-09-11 RX ADMIN — SODIUM CHLORIDE, POTASSIUM CHLORIDE, SODIUM LACTATE AND CALCIUM CHLORIDE: 600; 310; 30; 20 INJECTION, SOLUTION INTRAVENOUS at 12:33

## 2017-09-11 RX ADMIN — PHENYLEPHRINE HYDROCHLORIDE 0.5 MCG/KG/MIN: 10 INJECTION, SOLUTION INTRAMUSCULAR; INTRAVENOUS; SUBCUTANEOUS at 12:40

## 2017-09-11 RX ADMIN — ACETAMINOPHEN 975 MG: 325 TABLET, FILM COATED ORAL at 12:01

## 2017-09-11 RX ADMIN — BUPIVACAINE HYDROCHLORIDE IN DEXTROSE 1.3 ML: 7.5 INJECTION, SOLUTION SUBARACHNOID at 12:38

## 2017-09-11 RX ADMIN — SODIUM CITRATE AND CITRIC ACID MONOHYDRATE 30 ML: 500; 334 SOLUTION ORAL at 12:01

## 2017-09-11 ASSESSMENT — LIFESTYLE VARIABLES: TOBACCO_USE: 0

## 2017-09-11 NOTE — ANESTHESIA PROCEDURE NOTES
Spinal/LP Procedure Note    Spinal Block  Staff:     Anesthesiologist:  STACI SKAGGS    Resident/CRNA:  BLANCA BALLARD    Spinal/LP performed by resident/CRNA in presence of a teaching physician.    Location: In OR BEFORE Induction  Procedure Start/Stop Times:      9/11/2017 12:33 PM     9/11/2017 12:39 PM    patient identified, IV checked, site marked, risks and benefits discussed, informed consent, monitors and equipment checked, pre-op evaluation, at physician/surgeon's request and post-op pain management      Correct Patient: Yes      Correct Position: Yes      Correct Site: Yes      Correct Procedure: Yes      Correct Laterality:  N/A    Site Marked:  Yes and N/A  Procedure:     Procedure:  Intrathecal    ASA:  2    Diagnosis:  Surgical Pain    Position:  Sitting    Sterile Prep: chloraprep, mask, sterile gloves and patient draped      Insertion site:  L3-4    Approach:  Midline    Needle Type:  Nick    Needle gauge (G):  25    Local Skin Infiltration:  1% lidocaine    amount (ml):  3    Introducer used: Yes      Introducer gauge:  20 G    Attempts:  2    Redirects:  2    CSF:  Clear    Paresthesias:  No    Time injected:  12:39

## 2017-09-11 NOTE — PLAN OF CARE
Problem: Cervical Cerclage for Cervical Insufficiency (Adult,Obstetrics,Pediatric)  Goal: Signs and Symptoms of Listed Potential Problems Will be Absent or Manageable (Cervical Cerclage for Cervical Insufficiency)  Signs and symptoms of listed potential problems will be absent or manageable by discharge/transition of care (reference Cervical Cerclage for Cervical Insufficiency (Adult,Obstetrics,Pediatric) CPG).  Outcome: Therapy, progress toward functional goals as expected  Cervical cerclage performed per Dr Sloan and Dr Erazo. Procedure start at 1259 with procedure completion at 1313. Pt currently in labor and delivery PACU recovering well. VSS. Patient stable. Denies any uterine activity or discomfort. No vaginal bleeding or LOF noted. Will continue to monitor.

## 2017-09-11 NOTE — DISCHARGE SUMMARY
[facility] DISCHARGE SUMMARY  General acute hospital  Reji Lockwood  [id number]    Date of Admission: 2017  Date of Discharge: 2017    Admission Diagnoses:    - IUP @ 22w0d   - History of  delivery  - Cervical shortening  - chronic hepatitis B with high viral load    Discharge Diagnoses:   - Same, s/p Villanueva cerclage placement    Procedures: Ultrasound indicated Villanueva cervical cerclage    Admission History:   Reji Lockwood is a 37 year old  at 22w0d by LMP c/w 11 week US, here due to cervical shortening in the setting of a prior  delivery.  In her last delivery she experienced rupture of membranes at 23w5d with subsequent delivery at 24 weeks.  That child survived and has significant developmental delay.  She was recommended to have serial cervical length evaluations, but did not undergo these.  At her anatomy survey she was noted to have cervical shortening to 1.3 cm and she was recommended to have an ultrasound indicated cerclage.     She states that she is feeling well today.  + FM, no ctx, VB, or LOF.  She denies fever, HA, scotoma, nausea, vomiting, CP, SOB, RUQ pain, constipation, diarrhea, and acute swelling.      SUMMARY OF HOSPITAL COURSE:  She was admitted to the hospital and underwent a Villanueva cerclage placement with 2 sutures placed.  She was observed after the procedure for contractions, pain or vaginal bleeding which she had none.  Initially had urinary retention but was subsequently able to void spontaneously.  She was deemed stable for discharge and instructed to follow up as indicated.    DISCHARGE INSTRUCTIONS:  Call the clinic or return with cramping/contractions, vaginal bleeding, leaking of fluid or decreased fetal movement  Follow up at with Dr. Norwood on   Follow up at [facility] 2:15 pm on 9/15/17  You can take Tylenol 650 mg Q4H for pain, do not exceed 4,000 mg in 24 hours.  Colace for constipation    Felipa Garza MD  OB/GYN PGY3    Ez  Kacey

## 2017-09-11 NOTE — TELEPHONE ENCOUNTER
I spoke to patients insurance company and they will be faxing a prior authorization form.  Zara Junior, [facility]  September 11, 2017 3:40 PM

## 2017-09-11 NOTE — TELEPHONE ENCOUNTER
I called  Lab.  They will amend the quad screen and enter JESSICA of 1/15/2018.  Vianey Perry RN

## 2017-09-11 NOTE — TELEPHONE ENCOUNTER
Please place order for Lakshmi 17P injections to start process for patient and route back to pool.    Kezia Crump, CMA

## 2017-09-11 NOTE — IP AVS SNAPSHOT
MRN:[id number]                      After Visit Summary   9/11/2017    Reji Lockwood    MRN: [id number]           Thank you!     Thank you for choosing [facility] for your care. Our goal is always to provide you with excellent care. Hearing back from our patients is one way we can continue to improve our services. Please take a few minutes to complete the written survey that you may receive in the mail after you visit with us. Thank you!        Patient Information     Date Of Birth          1980        Designated Caregiver       Most Recent Value    Caregiver    Will someone help with your care after discharge? no      About your hospital stay     You were admitted on:  September 11, 2017 You last received care in the:  UR 4BOB    You were discharged on:  September 11, 2017        Reason for your hospital stay       You were admitted to the hospital today for a cervical cerclage procedure.                  Who to Call     For medical emergencies, please call 911.  For non-urgent questions about your medical care, please call your primary care provider or clinic, [phone]  For questions related to your surgery, please call your surgery clinic        Attending Provider     Provider Ramona Vital MD OB/Gyn    Ez Erazo MD OB/Gyn       Primary Care Provider Office Phone # Fax #    Fadia Georgia Serrano [phone]       When to contact your care team       Call your physician with any of the following:  -increased vaginal bleeding  -regular, painful contractions  -loss of fluid through the vagina  -inability to feel your baby move  -fever >100.4 degrees                  After Care Instructions     Activity       Your activity upon discharge: You may resume your usual activity after 24 hours with the following exception:  Nothing in the vagina for the remainder of pregnancy, including sexual intercourse, tampons or douching.                   Follow-up Appointments     Follow Up and recommended labs and tests       Follow up with Dr. Norwood on Wednesday at your regularly scheduled appointment.    Please make weekly appointments for the administration of progesterone injections.                  Your next 10 appointments already scheduled     Sep 13, 2017  3:45 PM CDT   ESTABLISHED PRENATAL with Main Norwood MD   [facility] ([facility])    [address] [facility] [address]   [phone]            Oct 09, 2017  9:00 AM CDT   US ABDOMEN/PELVIS DUPLEX COMPLETE with MGUS1, MG  TECH   [facility] ([facility])    [address] [place] [phone]           Please bring a list of your medicines (including vitamins, minerals and over-the-counter drugs). Also, tell your doctor about any allergies you may have. Wear comfortable clothes and leave your valuables at home.  Adults: No eating or drinking for 8 hours before the exam. You may take medicine with a small sip of water.  Children: - Children 6+ years: No food or drink for 6 hours before exam. - Children 1-5 years: No food or drink for 4 hours before exam. - Infants, breast-fed: may have breast milk up to 2 hours before exam. - Infants, formula: may have bottle until 4 hours before exam.  Please call the Imaging Department at your exam site with any questions.            Oct 09, 2017  9:50 AM CDT   LAB with LAB FIRST FLOOR [facility] ([facility])    [address] [place] [phone] [phone]           Patient must bring picture ID. Patient should be prepared to give a urine specimen  Please do not eat 10-12 hours before your appointment if you are coming in fasting for labs on lipids, cholesterol, or glucose (sugar). Pregnant women should follow their Care Team instructions. Water with medications is okay. Do not drink  coffee or other fluids. If you have concerns about taking  your medications, please ask at office or if scheduling via [employer], send a message by clicking on Secure Messaging, Message Your Care Team.            Oct 26, 2017  3:00 PM CDT   Return Visit with Breana Hood MD   [facility] ([facility])    [address] [place] [address] [phone]              Further instructions from your care team       Discharge Instruction for Undelivered Patients      You were seen for: cerclage placement  We Consulted: MFM  You had (Test or Medicine):Cerclage     Diet:   Drink 8 to 12 glasses of liquids (milk, juice, water) every day.  You may eat meals and snacks.     Activity:  Rest the pelvic area. No sex. Do not stimulate breasts or nipples.     Call your provider if you notice:  Swelling in your face or increased swelling in your hands or legs.  Headaches that are not relieved by Tylenol (acetaminophen).  Changes in your vision (blurring: seeing spots or stars.)  Nausea (sick to your stomach) and vomiting (throwing up).   Weight gain of 5 pounds or more per week.  Heartburn that doesn't go away.  Signs of bladder infection: pain when you urinate (use the toilet), need to go more often and more urgently.  The bag of mendoza (rupture of membranes) breaks, or you notice leaking in your underwear.  Bright red blood in your underwear.  Abdominal (lower belly) or stomach pain.  For first baby: Contractions (tightening) less than 5 minutes apart for one hour or more.  Second (plus) baby: Contractions (tightening) less than 10 minutes apart and getting stronger.  *If less than 34 weeks: Contractions (tightenings) more than 6 times in one hour.  Increase or change in vaginal discharge (note the color and amount)    Follow-up:  As scheduled in the clinic   Wednesday with own provider  Friday with [facility] ([place])      Undelivered Patients Discharge Instructions: Serenity henson  kacy mob saib koj vim: Cerclage placement  Peb hais kom koj: MFM  Koj tau (Kuaj los yog Tshuaj):Cerclage     Chun noj mov:   Drink 8 to 12 glasses of liquids (milk, juice, water) every day.  You may eat meals and snacks.     Ua zog:  Rest the pelvic area. No sex. Do not stimulate breasts or nipples.     Hu koj tus kws kacy mob yog tias koj pom:    Koj lub ntsej muag o o los yog koj txhais bina los sis txhais ceg o o ntxiv tuaj.    Chun mob kadie cady uas Tylenol (acetaminophen) tsis pab.    Koj txoj chun pom chun pauv (qhov muag plooj plooj: ua justino koj pom alex teev los yog alex qhov ci ci.)    Xeev siab (mob plab) thiab ntuav.     Nce li 5 phaus los sis ntau tshaj ntawd hauv ib fontenot piam.    Kub hauv siab uas tsis ploj mus.    Alex yam uas qhia tias muaj mob zais zis: mob thaum koj walt zis, yuav tsum walt zis ntau zaus thiab xav dim zis heev.    Lub zais dej tawg, los sis koj xau dej justino koj lub ris hauv qab.    Ntshav liab ploog uas los koj lub ris hauv qab.    Mob ntawm koj plab mog los sis lub plab.    Hais txog tus me nyuam thib ib: Mob plab yuav yug me nyuam ntau tshaj txhua 5 feeb tau ib teev rov leona.    Hais txog tus me nyuam thib ob (rov leona): Mob plab yuav yug me nyuam ntau tshaj 10 feeb thiab mob zuj zus ntxiv.    Qhov uas tawm kua hauv qhov chaw mos hloov los yog loj tuaj (saib yog xim dab tsi thiab npaum li martha).    Ua ntej dhau 37 lub fontenot tiam, atif li hu tuaj yog tias koj pom alex yam nram qab no:    Muaj mob plab txhua kaum feeb los yog ntau brenda     Tawm kua hauv qhov chaw mos    Hnov ceev ntawm cov txha ntsag    Mob nraub qaum me ntsis    Chun mob plab uas zoo li koj coj khaub ncaws    Mob plab thaum raws plab los yog thaum tsis raws plab      Lwm yam: As scheduled in the clinic      Pending Results     No orders found from 9/9/2017 to 9/12/2017.            Statement of Approval     Ordered          09/11/17 1346  I have reviewed and agree with all the recommendations and orders detailed in this document.   "EFFECTIVE NOW     Approved and electronically signed by:  Cristal Sloan MD             Admission Information     Date & Time Provider Department Dept. Phone    2017 Ez Erazo MD UR 4BOB [phone]      Your Vitals Were     Blood Pressure Pulse Temperature Respirations Height Weight    130/69 71 98.4  F (36.9  C) (Oral) 18 1.499 m (4' 11\") 60.8 kg (134 lb)    Last Period Pulse Oximetry BMI (Body Mass Index)             2017 (Within Weeks) 97% 27.06 kg/m2         [employer] Information     [employer] lets you send messages to your doctor, view your test results, renew your prescriptions, schedule appointments and more. To sign up, go to [email or web address] . Click on \"Log in\" on the left side of the screen, which will take you to the Welcome page. Then click on \"Sign up Now\" on the right side of the page.     You will be asked to enter the access code listed below, as well as some personal information. Please follow the directions to create your username and password.     Your access code is: [email or web address]  Expires: 12/10/2017  3:39 PM     Your access code will  in 90 days. If you need help or a new code, please call your [facility] clinic or [phone].        Care EveryWhere ID     This is your Care EveryWhere ID. This could be used by other organizations to access your [facility] medical records  [id number]        Equal Access to Services     [facility] AH: Hadii laurie ku hadasho Sopatali, waaxda luqadaha, qaybta kaalmada alexisegyada, ana daily . So [facility] [phone].    ATENCIÓN: Si habla español, tiene a gilliam disposición servicios gratuitos de asistencia lingüística. Llame al [phone].    We comply with applicable federal civil rights laws and [place] laws. We do not discriminate on the basis of race, color, national origin, age, disability sex, sexual orientation or gender identity.               Review of your medicines      START taking        Dose / " Directions    docusate sodium 100 MG tablet   Commonly known as:  COLACE        Dose:  100 mg   Take 100 mg by mouth daily   Quantity:  60 tablet   Refills:  1       hydroxyprogesterone caproate in oil 250 mg/mL intramuscular injection   Commonly known as:  LYDIA   Used for:  Supervision of high risk pregnancy, antepartum, second trimester, History of  delivery, currently pregnant in second trimester        Dose:  250 mg   Inject 1 mL (250 mg) into the muscle every 7 days   Quantity:  5 mL   Refills:  3         CONTINUE these medicines which may have CHANGED, or have new prescriptions. If we are uncertain of the size of tablets/capsules you have at home, strength may be listed as something that might have changed.        Dose / Directions    * acetaminophen 500 MG tablet   Commonly known as:  TYLENOL   This may have changed:  Another medication with the same name was added. Make sure you understand how and when to take each.        Dose:  500-1000 mg   Take 500-1,000 mg by mouth   Refills:  0       * acetaminophen 325 MG tablet   Commonly known as:  TYLENOL   This may have changed:  You were already taking a medication with the same name, and this prescription was added. Make sure you understand how and when to take each.        Dose:  650 mg   Take 2 tablets (650 mg) by mouth every 4 hours as needed for mild pain or fever   Quantity:  100 tablet   Refills:  0       * Notice:  This list has 2 medication(s) that are the same as other medications prescribed for you. Read the directions carefully, and ask your doctor or other care provider to review them with you.      CONTINUE these medicines which have NOT CHANGED        Dose / Directions    ondansetron 4 MG ODT tab   Commonly known as:  ZOFRAN ODT   Used for:  Nausea/vomiting in pregnancy        Dose:  4-8 mg   Take 1-2 tablets (4-8 mg) by mouth every 8 hours as needed for nausea   Quantity:  30 tablet   Refills:  2       prenatal multivitamin plus iron  27-0.8 MG Tabs per tablet   Used for:  Pregnancy test positive        Dose:  1 tablet   Take 1 tablet by mouth daily   Quantity:  100 tablet   Refills:  3            Where to get your medicines      These medications were sent to [facility] [place], [place] - [address]  [address], [facility] [address]     Phone:  [phone]     acetaminophen 325 MG tablet    docusate sodium 100 MG tablet         Some of these will need a paper prescription and others can be bought over the counter. Ask your nurse if you have questions.     You don't need a prescription for these medications     hydroxyprogesterone caproate in oil 250 mg/mL intramuscular injection                Protect others around you: Learn how to safely use, store and throw away your medicines at www.disposemymeds.org.             Medication List: This is a list of all your medications and when to take them. Check marks below indicate your daily home schedule. Keep this list as a reference.      Medications           Morning Afternoon Evening Bedtime As Needed    * acetaminophen 500 MG tablet   Commonly known as:  TYLENOL   Take 500-1,000 mg by mouth   Last time this was given:  650 mg on 9/11/2017  6:09 PM                                * acetaminophen 325 MG tablet   Commonly known as:  TYLENOL   Take 2 tablets (650 mg) by mouth every 4 hours as needed for mild pain or fever   Last time this was given:  650 mg on 9/11/2017  6:09 PM                                docusate sodium 100 MG tablet   Commonly known as:  COLACE   Take 100 mg by mouth daily                                hydroxyprogesterone caproate in oil 250 mg/mL intramuscular injection   Commonly known as:  LYDIA   Inject 1 mL (250 mg) into the muscle every 7 days                                ondansetron 4 MG ODT tab   Commonly known as:  ZOFRAN ODT   Take 1-2 tablets (4-8 mg) by mouth every 8 hours as needed for nausea                                 prenatal multivitamin plus iron 27-0.8 MG Tabs per tablet   Take 1 tablet by mouth daily                                * Notice:  This list has 2 medication(s) that are the same as other medications prescribed for you. Read the directions carefully, and ask your doctor or other care provider to review them with you.

## 2017-09-11 NOTE — OP NOTE
Operative Note: Cervical Cerclage         Pre-Op Diagnosis:   1) single intrauterine pregnancy at 22w0d by LMP c/w 11 week US  2) Cervical insufficiency by shortened cervix in the setting of prior  delivery         Post-Op Diagnosis:   1) same         Procedure:   1) Ultrasound indicated Villanueva cervical cerclage         Surgeons:   Attending: Ez Erazo MD  Fellow: Cristal Sloan MD  Medical Student: Marielena Amaro MS4, Tommie Haywood, MS3         Anesthesia:   Spinal          Estimated Blood Loss:   10 cc         Findings:   1) cervix not dilated prior to the procedure, closed following the procedure  2) fetal heart tones confirmed by doptone following the procedure         Specimens:   1) none         Complications:   None apparent          History:   Reji Lockwood is a 37 year oldy.o.  at 22w0d by LMP c/w 11 week US.  The patient's history is significant for a 24 week delivery after PPROM.  At her anatomy ultrasound her cervix was noted to be 1.3 cm long.  After counseling regarding these findings, the patient has decided opted to proceed with ultrasound indicated Villanueva cerclage.         Details of Procedure:   After administration of spinal anesthesia the patient was placed in the dorsal lithotomy position and prepped and draped in the usual fashion.  A weighted speculum was placed into the vagina and right angle retractors were used to visualize the cervix. The vagina and cervix were copiously cleansed with betadine solution.    A stay suture of #2 Ethilon was placed in a circumferential pattern at the level of the external os for traction. A circumferential suture of #2 Ethilon was placed in the usual fashion at the cervicovaginal reflection with knot tied at 12:00.   A second suture of #2 Ethilon was placed approximately 0.5cm proximal to the first stitch and tied at 1:00.  Both sutures were securely tied down and digital exam confirmed that the cervix was closed.  The stay suture was  removed and hemostasis was assured.    The bladder was drained of clear urine and a rectal exam confirmed that no sutures were present in the rectum.    The cervix and vaginal vault were inspected and noted to be free of injury and hemostatic.      The instruments were removed from the vagina, and the patient was transferred to the recovery room in satisfactory condition.    Sponge and needle counts were correct at the close of the case x 2.    Dr. Erazo was present and scrubbed for the entire procedure.    Cristal Sloan  Maternal Fetal Medicine  September 11, 2017    Physician Attestation   I was present for the entire procedure between opening and closing.    Ez Erazo  Date of Service (when I saw the patient): 09/11/17

## 2017-09-11 NOTE — ANESTHESIA PREPROCEDURE EVALUATION
Anesthesia Evaluation     . Pt has not had prior anesthetic            ROS/MED HX    ENT/Pulmonary:  - neg pulmonary ROS    (-) tobacco use   Neurologic:  - neg neurologic ROS     Cardiovascular:  - neg cardiovascular ROS   (+) ----. : . . . :. . Previous cardiac testing date:results:date: results:ECG reviewed date:7/28/2015 results:Normal Sinus date: results:          METS/Exercise Tolerance:     Hematologic:  - neg hematologic  ROS   (+) History of Transfusion previous transfusion reaction -      Musculoskeletal:  - neg musculoskeletal ROS       GI/Hepatic:     (+) hepatitis type B,       Renal/Genitourinary:  - ROS Renal section negative       Endo:  - neg endo ROS       Psychiatric:  - neg psychiatric ROS       Infectious Disease:         Malignancy:         Other:                     Physical Exam  Normal systems: cardiovascular, pulmonary and dental    Airway   Mallampati: III  TM distance: >3 FB  Neck ROM: full    Dental     Cardiovascular       Pulmonary                     Anesthesia Plan      History & Physical Review  History and physical reviewed and following examination; no interval change.    ASA Status:  2 .    NPO Status:  > 8 hours    Plan for Spinal   PONV prophylaxis:  Ondansetron (or other 5HT-3)       Postoperative Care  Postoperative pain management:  Neuraxial analgesia and IV analgesics.      Consents  Anesthetic plan, risks, benefits and alternatives discussed with:  Patient.  Use of blood products discussed: Yes.   Use of blood products discussed with Patient.  Consented to blood products.  .        ANESTHESIA PREOP EVALUATION    Procedure: Procedure(s):  Cervical Cerclage  - Wound Class:     HPI: Reji Lockwood is a 37 year old female with hepatitis B presenting for above procedure.    PMHx/PSHx/ROS:  Past Medical History:   Diagnosis Date     Chronic viral hepatitis B without delta agent and without coma (H) 2010       Past Surgical History:   Procedure Laterality Date     NO HISTORY OF  SURGERY           Past Anes Hx: No personal or family h/o anesthesia problems    Soc Hx:   Social History   Substance Use Topics     Smoking status: Never Smoker     Smokeless tobacco: Never Used     Alcohol use No       Allergies:   Allergies   Allergen Reactions     Blood Transfusion Related (Informational Only) Other (See Comments)     Patient has a history of a clinically significant antibody against RBC antigens.  A delay in compatible RBCs may occur.     Fish Allergy Hives       Meds:   No prescriptions prior to admission.       Current Outpatient Prescriptions   Medication Sig Dispense Refill     ondansetron (ZOFRAN ODT) 4 MG ODT tab Take 1-2 tablets (4-8 mg) by mouth every 8 hours as needed for nausea 30 tablet 2     acetaminophen (TYLENOL) 500 MG tablet Take 500-1,000 mg by mouth       Prenatal Vit-Fe Fumarate-FA (PRENATAL MULTIVITAMIN  PLUS IRON) 27-0.8 MG TABS per tablet Take 1 tablet by mouth daily 100 tablet 3       Physical Exam:  Vitals: LMP 03/28/2017 (Within Weeks)  BMI= There is no height or weight on file to calculate BMI.      Labs:  UPT: No results found for: HCGQUANT      BMP:  No results for input(s): NA, POTASSIUM, CHLORIDE, CO2, BUN, CR, GLC, DIANNE in the last 59561 hours.  CBC:   Recent Labs   Lab Test  06/09/17   1556   WBC  8.7   RBC  4.46   HGB  13.0   HCT  38.9   MCV  87   MCH  29.1   MCHC  33.4   RDW  13.3   PLT  204     Coags:  No results for input(s): INR, PTT, FIBR in the last 32743 hours.    Assessment/Plan:  - ASA 2  - Spinal with standard ASA monitors   - Antibiotics per surgeon   - Analgesia: Spinal, ketorolac   - Fluids: LR   - PONV prophylaxis: Tevin Rosas Jr., MD  Anesthesia Resident - CA2  Pager: [phone]  9/11/2017  6:00 AM

## 2017-09-11 NOTE — PLAN OF CARE
Problem: Cervical Cerclage for Cervical Insufficiency (Adult,Obstetrics,Pediatric)  Goal: Signs and Symptoms of Listed Potential Problems Will be Absent or Manageable (Cervical Cerclage for Cervical Insufficiency)  Signs and symptoms of listed potential problems will be absent or manageable by discharge/transition of care (reference Cervical Cerclage for Cervical Insufficiency (Adult,Obstetrics,Pediatric) CPG).  Outcome: Therapy, progress toward functional goals as expected  [facility]: [facility]   Reason for maternal/fetal assessment per patient is Procedure (scheduled cerclage)  .  Patient is a . Prenatal record reviewed.      Obstetric History       T0      L1     SAB0   TAB0   Ectopic0   Multiple0   Live Births1        # Outcome Date GA Lbr Vernon/2nd Weight Sex Delivery Anes PTL Lv   2 Current                     1   24w0d   0.595 kg (1 lb 5 oz) M  None   DIANA      Name: Chouchee      Complications: Prolonged PROM (>18 hours), premature rupture of membranes (PPROM) with onset of labor after 24 hours of rupture in second trimester, antepartum, labor       . Medical history:   Past Medical History:   Diagnosis Date     Chronic viral hepatitis B without delta agent and without coma (H)    . Gestational Age 22w0d. VSS. Fetal movement present. Patient denies cramping, backache, vaginal discharge, pelvic pressure, UTI symptoms, GI problems, bloody show, vaginal bleeding, edema, headache, visual disturbances, epigastric or URQ pain, abdominal pain, rupture of membranes. Support person present.  Action: Admission assessment completed. [place] applied with no uterine activity. Doptone of FHR for assessment of fetal wellbeing; presumed adequate oxygenation.   Response: Dr. Sloan and Kacey informed of patient's arrival for scheduled cerclage. Plan per providers is to perform scheduled surgery today. Patient verbalized agreement with plan.  Intravenous access obtained, admission completed and providers in to see patient periodically.

## 2017-09-11 NOTE — DISCHARGE INSTRUCTIONS
Discharge Instruction for Undelivered Patients      You were seen for: cerclage placement  We Consulted: MFM  You had (Test or Medicine):Cerclage     Diet:   Drink 8 to 12 glasses of liquids (milk, juice, water) every day.  You may eat meals and snacks.     Activity:  Rest the pelvic area. No sex. Do not stimulate breasts or nipples.     Call your provider if you notice:  Swelling in your face or increased swelling in your hands or legs.  Headaches that are not relieved by Tylenol (acetaminophen).  Changes in your vision (blurring: seeing spots or stars.)  Nausea (sick to your stomach) and vomiting (throwing up).   Weight gain of 5 pounds or more per week.  Heartburn that doesn't go away.  Signs of bladder infection: pain when you urinate (use the toilet), need to go more often and more urgently.  The bag of mendoza (rupture of membranes) breaks, or you notice leaking in your underwear.  Bright red blood in your underwear.  Abdominal (lower belly) or stomach pain.  For first baby: Contractions (tightening) less than 5 minutes apart for one hour or more.  Second (plus) baby: Contractions (tightening) less than 10 minutes apart and getting stronger.  *If less than 34 weeks: Contractions (tightenings) more than 6 times in one hour.  Increase or change in vaginal discharge (note the color and amount)    Follow-up:  As scheduled in the clinic   Wednesday with own provider  Friday with M ([place])      [place] [facility] [place]    Tuhenri barrientos saib konick vim: Cerclage placement  Peb hais kom koj: MFM  Lela tau (Kuaj los yog Tshuaj):Cerclage     Shiraz noj mov:   Drink 8 to 12 glasses of liquids (milk, juice, water) every day.  You may eat meals and snacks.     Ua zog:  Rest the pelvic area. No sex. Do not stimulate breasts or nipples.     Hu lela barrientos yog tias koj pom:    Koj lub ntsej muag o o los yog koj txhais bina los sis txhais ceg o o ntxiv tuaj.    Shiraz iliana kadie cady uas Tylenol  (acetaminophen) tsis pab.    Koj txoj chun pom chun pauv (qhov muag plooj plooj: ua justino koj pom alex teev los yog alex qhov ci ci.)    Xeev siab (mob plab) thiab ntuav.     Nce li 5 phaus los sis ntau tshaj ntawd hauv ib fontenot piam.    Kub hauv siab uas tsis ploj mus.    Alex yam uas qhia tias muaj mob zais zis: mob thaum koj  zis, yuav tsum walt zis ntau zaus thiab xav dim zis heev.    Lub zais dej tawg, los sis koj xau dej justino koj lub ris hauv qab.    Ntshav liab ploog uas los koj lub ris hauv qab.    Mob ntawm koj plab mog los sis lub plab.    Hais txog tus me nyuam thib ib: Mob plab yuav yug me nyuam ntau tshaj txhua 5 feeb tau ib teev rov leona.    Hais txog tus me nyuam thib ob (rov leona): Mob plab yuav yug me nyuam ntau tshaj 10 feeb thiab mob zuj zus ntxiv.    Qhov uas tawm kua hauv qhov chaw mos hloov los yog loj tuaj (saib yog xim dab tsi thiab npaum li martha).    Ua ntej dhau 37 lub fontenot tiam, atif li hu tuaj yog tias koj pom alex yam nram qab no:    Muaj mob plab txhua kaum feeb los yog ntau brenda     Tawm kua hauv qhov chaw mos    Hnov ceev ntawm cov txha ntsag    Mob nraub qaum me ntsis    Chun mob plab uas zoo li koj coj khaub ncaws    Mob plab thaum raws plab los yog thaum tsis raws plab      Lwm yam: As scheduled in the clinic

## 2017-09-11 NOTE — H&P
L&D History and Physical   2017  Reji Lockwood  [id number]    Admission Date: 2017   PCP: Fadia Sauceda     HPI: Reji Lockwood is a 37 year old  at 22w0d by LMP c/w 11 week US, here due to cervical shortening in the setting of a prior  delivery.  In her last delivery she experienced rupture of membranes at 23w5d with subsequent delivery at 24 weeks.  That child survived and has significant developmental delay.  She was recommended to have serial cervical length evaluations, but did not undergo these.  At her anatomy survey she was noted to have cervical shortening to 1.3 cm and she was recommended to have an ultrasound indicated cerclage.    She states that she is feeling well today.  + FM, no ctx, VB, or LOF.  She denies fever, HA, scotoma, nausea, vomiting, CP, SOB, RUQ pain, constipation, diarrhea, and acute swelling.        Pregnancy notable for:  - history of  delivery  - cervical shortening  - chronic hepatitis B with high viral load    OBHX:   Obstetric History       T0      L1     SAB0   TAB0   Ectopic0   Multiple0   Live Births1       # Outcome Date GA Lbr Vernon/2nd Weight Sex Delivery Anes PTL Lv   2 Current            1   24w0d  0.595 kg (1 lb 5 oz) M  None  DIANA      Name: Chepe      Complications: Prolonged PROM (>18 hours), premature rupture of membranes (PPROM) with onset of labor after 24 hours of rupture in second trimester, antepartum, labor          MedicalHX:   Past Medical History:   Diagnosis Date     Chronic viral hepatitis B without delta agent and without coma (H)        SurgicalHX:   Past Surgical History:   Procedure Laterality Date     NO HISTORY OF SURGERY         Medications:     No current facility-administered medications on file prior to encounter.   Current Outpatient Prescriptions on File Prior to Encounter:  ondansetron (ZOFRAN ODT) 4 MG ODT tab Take 1-2 tablets (4-8 mg) by mouth every 8  "hours as needed for nausea   acetaminophen (TYLENOL) 500 MG tablet Take 500-1,000 mg by mouth   Prenatal Vit-Fe Fumarate-FA (PRENATAL MULTIVITAMIN  PLUS IRON) 27-0.8 MG TABS per tablet Take 1 tablet by mouth daily       Allergies: *most recent antibody screen is negative  Allergies   Allergen Reactions     Blood Transfusion Related (Informational Only) Other (See Comments)     Patient has a history of a clinically significant antibody against RBC antigens.  A delay in compatible RBCs may occur.     Fish Allergy Hives       FamilyHX:   Family History   Problem Relation Age of Onset     KIDNEY DISEASE Mother      Dead     Unknown/Adopted Father        SocialHX:   Social History     Social History     Marital status:      Spouse name: gerardo Pozo     Number of children: 1     Years of education: N/A     Occupational History           Social History Main Topics     Smoking status: Never Smoker     Smokeless tobacco: Never Used     Alcohol use No     Drug use: No     Sexual activity: Yes     Partners: Male     Birth control/ protection: None     Other Topics Concern     None     Social History Narrative    From [facility] to [facility] to [place] in [place].     [place].        ROS: 10-point ROS negative except as in HPI     Physical Exam:  Vitals:    09/11/17 1048   BP: 110/61   Pulse: 71   Resp: 18   Temp: 98.2  F (36.8  C)   TempSrc: Oral   Weight: 60.8 kg (134 lb)   Height: 1.499 m (4' 11\")     GEN: resting comfortably in bed, NAD   ABD: soft, gravid, non-tender, non-distended  EXT: no edema, non-tender to palpation  CVX: sterile speculum exam shows a visually closed cervix with moderate amount of white vaginal discharge  TOCO: quiet    Ultrasounds:  9/8:   g, LAVELLE 14.2, cervical length 1.3 cm      Lab Results   Component Value Date    ABO B 06/09/2017    RH  Pos 06/09/2017    AS Neg 08/10/2017    HEPBANG Reactive (A) 06/09/2017    TREPAB Negative 06/09/2017    HGB 13.0 06/09/2017       GBS Status:   No " results found for: GBS    Lab Results   Component Value Date    PAP NIL 2015       A/P: 37 year old .  Admit for: cervical cerclage  - NPO  - doptones prior to procedure  - wet prep obtained and pending  - spinal for procedure is preferred  - after procedure, will remain obs until walking and voiding  - will consider indomethacin after procedure  - no antibiotics indicated for this procedure.    The patient was seen and evaluated by Dr. Erazo.    Cristal Sloan  2017 11:42 AM       Physician Attestation   I, Ez Erazo, saw this patient with the resident and agree with the resident s findings and plan of care as documented in the resident s note.      I personally reviewed vital signs, medications, labs, imaging and EFM.    Key findings: In summary, Reji Lockwood is a  at 22w0d admitted for US indicated cerclage placement due to cervical shortening and history of PTB. Agree with plan as per above and strongly recommend that Reji re-consider her prior decision to forego 17P in this pregnancy.  She states she will discuss this with Dr. Norwood at her OB visit and make a decision then.  A final TV US will be scheduled on Friday and as long as cerclage is satisfactorily in place, no further CL US are recommended.  Informed consent to proceed with cerclage was obtained.    Ez Erazo  Date of Service (when I saw the patient): 17    Time Spent on this Encounter   IEz, spent a total of 30 minutes bedside and on the inpatient unit today managing the care of Reji Lockwood.  Over 50% of my time on the unit was spent counseling the patient and /or coordinating care regarding plan for US indicated cerclage and history of PTB. See note for details.    Ez Erazo

## 2017-09-11 NOTE — PLAN OF CARE
Pt transferred to PSCU via cart at 1440. Pt stable. VSS. No lochia or LOF noted. Positive fetal movement per patienr report. Doptone prior to discharge from PACU obtained. Mild uterine cramping beginning per patient report. Patient instructed to report increase in discomfort, VB, LOF or changes in fetal movement. Patient tolerating clear liquid diet. Will advance to regular diet at this time per protocol. Will continue to monitor.

## 2017-09-11 NOTE — PROGRESS NOTES
L&D History and Physical   2017  Reji Lockwood  [id number]    Admission Date: 2017   PCP: Fadia Sauceda     HPI: Reji Lockwood is a 37 year old  at 22w0d by LMP c/w 11 week US, here due to cervical shortening in the setting of a prior  delivery.  In her last delivery she experienced rupture of membranes at 23w5d with subsequent delivery at 24 weeks.  That child survived and has significant developmental delay.  She was recommended to have serial cervical length evaluations, but did not undergo these.  At her anatomy survey she was noted to have cervical shortening to 1.3 cm and she was recommended to have an ultrasound indicated cerclage.    She states that she is feeling well today.  + FM, no ctx, VB, or LOF.  She denies fever, HA, scotoma, nausea, vomiting, CP, SOB, RUQ pain, constipation, diarrhea, and acute swelling.        Pregnancy notable for:  - history of  delivery  - cervical shortening  - chronic hepatitis B with high viral load    OBHX:   Obstetric History       T0      L1     SAB0   TAB0   Ectopic0   Multiple0   Live Births1       # Outcome Date GA Lbr Vernon/2nd Weight Sex Delivery Anes PTL Lv   2 Current            1   24w0d  0.595 kg (1 lb 5 oz) M  None  DIANA      Name: Chepe      Complications: Prolonged PROM (>18 hours), premature rupture of membranes (PPROM) with onset of labor after 24 hours of rupture in second trimester, antepartum, labor          MedicalHX:   Past Medical History:   Diagnosis Date     Chronic viral hepatitis B without delta agent and without coma (H)        SurgicalHX:   Past Surgical History:   Procedure Laterality Date     NO HISTORY OF SURGERY         Medications:     No current facility-administered medications on file prior to encounter.   Current Outpatient Prescriptions on File Prior to Encounter:  ondansetron (ZOFRAN ODT) 4 MG ODT tab Take 1-2 tablets (4-8 mg) by mouth every 8  "hours as needed for nausea   acetaminophen (TYLENOL) 500 MG tablet Take 500-1,000 mg by mouth   Prenatal Vit-Fe Fumarate-FA (PRENATAL MULTIVITAMIN  PLUS IRON) 27-0.8 MG TABS per tablet Take 1 tablet by mouth daily       Allergies: *most recent antibody screen is negative  Allergies   Allergen Reactions     Blood Transfusion Related (Informational Only) Other (See Comments)     Patient has a history of a clinically significant antibody against RBC antigens.  A delay in compatible RBCs may occur.     Fish Allergy Hives       FamilyHX:   Family History   Problem Relation Age of Onset     KIDNEY DISEASE Mother      Dead     Unknown/Adopted Father        SocialHX:   Social History     Social History     Marital status:      Spouse name: gerardo Pozo     Number of children: 1     Years of education: N/A     Occupational History           Social History Main Topics     Smoking status: Never Smoker     Smokeless tobacco: Never Used     Alcohol use No     Drug use: No     Sexual activity: Yes     Partners: Male     Birth control/ protection: None     Other Topics Concern     None     Social History Narrative    From [facility] to [facility] to [place] in [place].     [place].        ROS: 10-point ROS negative except as in HPI     Physical Exam:  Vitals:    09/11/17 1048   BP: 110/61   Pulse: 71   Resp: 18   Temp: 98.2  F (36.8  C)   TempSrc: Oral   Weight: 60.8 kg (134 lb)   Height: 1.499 m (4' 11\")     GEN: resting comfortably in bed, NAD   ABD: soft, gravid, non-tender, non-distended  EXT: no edema, non-tender to palpation  CVX: sterile speculum exam shows a visually closed cervix with moderate amount of white vaginal discharge  TOCO: quiet    Ultrasounds:  9/8:   g, LAVELLE 14.2, cervical length 1.3 cm      Lab Results   Component Value Date    ABO B 06/09/2017    RH  Pos 06/09/2017    AS Neg 08/10/2017    HEPBANG Reactive (A) 06/09/2017    TREPAB Negative 06/09/2017    HGB 13.0 06/09/2017       GBS Status:   No " results found for: GBS    Lab Results   Component Value Date    PAP NIL 2015       A/P: 37 year old .  Admit for: cervical cerclage  - NPO  - doptones prior to procedure  - wet prep obtained and pending  - spinal for procedure is preferred  - after procedure, will remain obs until walking and voiding  - will consider indomethacin after procedure  - no antibiotics indicated for this procedure.    The patient was seen and evaluated by Dr. Erazo.    Cristal Sloan  2017 11:42 AM     Physician Attestation     I, Ez Erazo, saw this patient with the resident and agree with the resident s findings and plan of care as documented in the resident s note.       I personally reviewed vital signs, medications, labs, imaging and EFM.     Key findings: In summary, Reji Lockwood is a  at 22w0d admitted for US indicated cerclage placement due to cervical shortening and history of PTB. Agree with plan as per above and strongly recommend that Reji re-consider her prior decision to forego 17P in this pregnancy.  She states she will discuss this with Dr. Norwood at her OB visit and make a decision then.  A final TV US will be scheduled on Friday and as long as cerclage is satisfactorily in place, no further CL US are recommended.  Informed consent to proceed with cerclage was obtained.     Ez Erazo  Date of Service (when I saw the patient): 17

## 2017-09-11 NOTE — ANESTHESIA CARE TRANSFER NOTE
Patient: Reji Fabián    Procedure(s):  Cervical Cerclage  - Wound Class: I-Clean    Diagnosis: Incompetent Cervix   Diagnosis Additional Information: No value filed.    Anesthesia Type:   Spinal     Note:  Airway :Room Air  Patient transferred to:PACU  Comments: Airway :Room Air  Patient transferred to:PACU  Comments: Patient was following commands, warm and demonstrated adequate strength.  Transported to PACU on room air.   VSS upon arrival to PACU.  Patient denies nausea or pain at this time.   Care transfer plan communicated and patient care transferred to PACU RN     Zaid Rosas Jr., MD  Anesthesia Resident - CA2  Pager: [phone]  9/11/2017  1:33 PM        Vitals: (Last set prior to Anesthesia Care Transfer)    CRNA VITALS  9/11/2017 1251 - 9/11/2017 1333      9/11/2017             NIBP: 107/67    NIBP Mean: 77                Electronically Signed By: Zaid Rosas MD  September 11, 2017  1:33 PM

## 2017-09-11 NOTE — IP AVS SNAPSHOT
UR 6BOB    [address]    [facility] [place] [place] [address]    Phone:  [phone]                                       After Visit Summary   9/11/2017    Reji Lockwood    MRN: [id number]           After Visit Summary Signature Page     I have received my discharge instructions, and my questions have been answered. I have discussed any challenges I see with this plan with the nurse or doctor.    ..........................................................................................................................................  Patient/Patient Representative Signature      ..........................................................................................................................................  Patient Representative Print Name and Relationship to Patient    ..................................................               ................................................  Date                                            Time    ..........................................................................................................................................  Reviewed by Signature/Title    ...................................................              ..............................................  Date                                                            Time

## 2017-09-12 NOTE — PLAN OF CARE
Problem: Goal Outcome Summary  Goal: Goal Outcome Summary  Outcome: Adequate for Discharge Date Met:  09/11/17  Pt given discharge instructions. Reviewed with . All questions answered, pt verbalizes understanding.      Discharged to home.

## 2017-09-13 ENCOUNTER — PRENATAL OFFICE VISIT (OUTPATIENT)
Dept: OBGYN | Facility: CLINIC | Age: 37
End: 2017-09-13
Payer: COMMERCIAL

## 2017-09-13 VITALS
BODY MASS INDEX: 27.87 KG/M2 | SYSTOLIC BLOOD PRESSURE: 111 MMHG | HEART RATE: 98 BPM | WEIGHT: 138 LBS | DIASTOLIC BLOOD PRESSURE: 75 MMHG | TEMPERATURE: 98.4 F

## 2017-09-13 DIAGNOSIS — O09.92 SUPERVISION OF HIGH RISK PREGNANCY, ANTEPARTUM, SECOND TRIMESTER: ICD-10-CM

## 2017-09-13 DIAGNOSIS — O09.522 AMA (ADVANCED MATERNAL AGE) MULTIGRAVIDA 35+, SECOND TRIMESTER: ICD-10-CM

## 2017-09-13 DIAGNOSIS — O34.32 CERVICAL INSUFFICIENCY DURING PREGNANCY, ANTEPARTUM, SECOND TRIMESTER: ICD-10-CM

## 2017-09-13 PROCEDURE — 99207 ZZC PRENATAL VISIT: CPT | Performed by: OBSTETRICS & GYNECOLOGY

## 2017-09-13 NOTE — MR AVS SNAPSHOT
After Visit Summary   9/13/2017    Reji Lockwood    MRN: [id number]           Patient Information     Date Of Birth          1980        Visit Information        Provider Department      9/13/2017 3:45 PM Main Norwood MD; [employer] [facility]        Today's Diagnoses     Cervical insufficiency during pregnancy, antepartum, second trimester        Supervision of high risk pregnancy, antepartum, second trimester        AMA (advanced maternal age) multigravida 35+, second trimester          Care Instructions                                                        If you have any questions regarding your visit, Please contact your care team.     [employer]: [phone]    [facility] CLINIC HOURS TELEPHONE NUMBER   Main Norwood M.D.      Cristal-    Maddie Penaloza-NAYA Mast-Medical Assistant   Monday-[place]  8:00a.m-4:45 p.m  Wednesday-[place] 8:00a.m-4:45 p.m.  [facility]  8:00[facility].m-4:45 p.m.  [facility]  8:00a.m-4:45 p.m. [facility]  [address] [place]  [place] [address]  [phone] ask [facility]  [phone] Fax  Imaging [phone]    [facility] Labor [facility] Delivery  [address] [place] [address] [place] [address]  [phone]    [facility]  [address] [facility] [place] [address] [phone] ask [facility]  [phone] Fax  Imaging [phone]     Urgent Care locations:    [facility] Monday-Friday  5 pm - 9 pm  Saturday and Sunday   9 am - 5 pm    Monday-Friday   11 am - 9 pm  Saturday and Sunday   9 am - 5 pm   [phone]       If you need a medication refill, please contact your pharmacy. Please allow 3 business days for your refill to be completed.  As always, Thank you for trusting us with your healthcare needs!            Follow-ups after your visit        Your  next 10 appointments already scheduled     Oct 09, 2017  9:00 AM CDT   US ABDOMEN/PELVIS DUPLEX COMPLETE with MGUS1, MG  TECH   [facility] ([facility])    [address] [place] [id number] [phone]           Please bring a list of your medicines (including vitamins, minerals and over-the-counter drugs). Also, tell your doctor about any allergies you may have. Wear comfortable clothes and leave your valuables at home.  Adults: No eating or drinking for 8 hours before the exam. You may take medicine with a small sip of water.  Children: - Children 6+ years: No food or drink for 6 hours before exam. - Children 1-5 years: No food or drink for 4 hours before exam. - Infants, breast-fed: may have breast milk up to 2 hours before exam. - Infants, formula: may have bottle until 4 hours before exam.  Please call the Imaging Department at your exam site with any questions.            Oct 09, 2017  9:50 AM CDT   LAB with LAB FIRST FLOOR [facility] ([facility])    [address] [facility] [address] [phone]           Patient must bring picture ID. Patient should be prepared to give a urine specimen  Please do not eat 10-12 hours before your appointment if you are coming in fasting for labs on lipids, cholesterol, or glucose (sugar). Pregnant women should follow their Care Team instructions. Water with medications is okay. Do not drink coffee or other fluids. If you have concerns about taking  your medications, please ask at office or if scheduling via [employer], send a message by clicking on Secure Messaging, Message Your Care Team.            Oct 26, 2017  3:00 PM CDT   Return Visit with Breana Hood MD   [facility])    [address] [facility] [address] [phone]              Who to contact     If you have questions or need follow up information  "about today's clinic visit or your schedule please contact [facility] KIMI RAYMOND directly at [phone].  Normal or non-critical lab and imaging results will be communicated to you by [employer], letter or phone within 4 business days after the clinic has received the results. If you do not hear from us within 7 days, please contact the clinic through [employer] or phone. If you have a critical or abnormal lab result, we will notify you by phone as soon as possible.  Submit refill requests through [employer] or call your pharmacy and they will forward the refill request to us. Please allow 3 business days for your refill to be completed.          Additional Information About Your Visit        [email or web address] Information     [employer] lets you send messages to your doctor, view your test results, renew your prescriptions, schedule appointments and more. To sign up, go to [email or web address] . Click on \"Log in\" on the left side of the screen, which will take you to the Welcome page. Then click on \"Sign up Now\" on the right side of the page.     You will be asked to enter the access code listed below, as well as some personal information. Please follow the directions to create your username and password.     Your access code is: [email or web address]  Expires: 12/10/2017  3:39 PM     Your access code will  in 90 days. If you need help or a new code, please call your [place] clinic or [phone].        Care EveryWhere ID     This is your Care EveryWhere ID. This could be used by other organizations to access your [place] medical records  [id number]        Your Vitals Were     Pulse Temperature Last Period Breastfeeding? BMI (Body Mass Index)       98 98.4  F (36.9  C) (Oral) 2017 (Within Weeks) No 27.87 kg/m2        Blood Pressure from Last 3 Encounters:   17 111/75   17 130/69   08/10/17 112/67    Weight from Last 3 Encounters:   17 138 lb (62.6 kg)   17 134 lb (60.8 kg)   08/10/17 135 lb " (61.2 kg)              Today, you had the following     No orders found for display       Primary Care Provider Office Phone # Fax #    Fadia Georgia Serrano [phone] [address]  [facility] [address]        Equal Access to Services     [facility]: Hadii aad ku hadasho Soomaali, waaxda luqadaha, qaybta kaalmada adeegyada, waxay idiin hayaan adeeg kharash lapaulina . So [facility] [phone].    ATENCIÓN: Si habla español, tiene a gilliam disposición servicios gratuitos de asistencia lingüística. Deborah al [phone].    We comply with applicable federal civil rights laws and [place] laws. We do not discriminate on the basis of race, color, national origin, age, disability sex, sexual orientation or gender identity.            Thank you!     Thank you for choosing [facility]  for your care. Our goal is always to provide you with excellent care. Hearing back from our patients is one way we can continue to improve our services. Please take a few minutes to complete the written survey that you may receive in the mail after your visit with us. Thank you!             Your Updated Medication List - Protect others around you: Learn how to safely use, store and throw away your medicines at www.disposemymeds.org.          This list is accurate as of: 9/13/17  4:03 PM.  Always use your most recent med list.                   Brand Name Dispense Instructions for use Diagnosis    * acetaminophen 500 MG tablet    TYLENOL     Take 500-1,000 mg by mouth        * acetaminophen 325 MG tablet    TYLENOL    100 tablet    Take 2 tablets (650 mg) by mouth every 4 hours as needed for mild pain or fever    Cervical insufficiency during pregnancy, antepartum, second trimester       docusate sodium 100 MG tablet    COLACE    60 tablet    Take 100 mg by mouth daily    Cervical insufficiency during pregnancy, antepartum, second trimester       hydroxyprogesterone caproate in oil 250  mg/mL intramuscular injection    LYDIA    5 mL    Inject 1 mL (250 mg) into the muscle every 7 days    Cervical insufficiency during pregnancy, antepartum, second trimester, Supervision of high risk pregnancy, antepartum, second trimester, History of  delivery, currently pregnant in second trimester       ondansetron 4 MG ODT tab    ZOFRAN ODT    30 tablet    Take 1-2 tablets (4-8 mg) by mouth every 8 hours as needed for nausea    Nausea/vomiting in pregnancy       prenatal multivitamin plus iron 27-0.8 MG Tabs per tablet     100 tablet    Take 1 tablet by mouth daily    Pregnancy test positive       * Notice:  This list has 2 medication(s) that are the same as other medications prescribed for you. Read the directions carefully, and ask your doctor or other care provider to review them with you.

## 2017-09-13 NOTE — TELEPHONE ENCOUNTER
Called [employer] insurance and they need a PA it was filled out and faxed to Dr. Norwood to sign. Omni, CMA will fax it in when done.  Radha Lynn, DESHAUN

## 2017-09-13 NOTE — NURSING NOTE
"Chief Complaint   Patient presents with     Prenatal Care     OBV 22w2d       Initial /75 (BP Location: Left arm, Patient Position: Chair, Cuff Size: Adult Regular)  Pulse 98  Temp 98.4  F (36.9  C) (Oral)  Wt 138 lb (62.6 kg)  LMP 03/28/2017 (Within Weeks)  Breastfeeding? No  BMI 27.87 kg/m2 Estimated body mass index is 27.87 kg/(m^2) as calculated from the following:    Height as of 9/11/17: 4' 11\" (1.499 m).    Weight as of this encounter: 138 lb (62.6 kg).  Medication Reconciliation: complete   Kezia Crump CMA      "

## 2017-09-14 ENCOUNTER — TELEPHONE (OUTPATIENT)
Dept: OBGYN | Facility: CLINIC | Age: 37
End: 2017-09-14

## 2017-09-14 DIAGNOSIS — O09.92 SUPERVISION OF HIGH RISK PREGNANCY, ANTEPARTUM, SECOND TRIMESTER: ICD-10-CM

## 2017-09-14 DIAGNOSIS — O09.522 AMA (ADVANCED MATERNAL AGE) MULTIGRAVIDA 35+, SECOND TRIMESTER: ICD-10-CM

## 2017-09-14 DIAGNOSIS — O34.32 CERVICAL INSUFFICIENCY DURING PREGNANCY, ANTEPARTUM, SECOND TRIMESTER: ICD-10-CM

## 2017-09-14 NOTE — PROGRESS NOTES
No signif signs, symptoms or concerns except had Villanueva cerclage placed two days ago and doing well. Will start Lakshmi 17P weekly injections as soon as PA goes through. Advise remaining off work through pregnancy now (last day worked was 9/7)- will complete forms for this when they arrive. Re-calculated quad screening was normal. Tenofovir treatment for hep B is anticipated in 3rd trim. RBC Ab screening was initially positive but follow-up neg. Here with . Advice appropriate to gestational age reviewed including pertinent Checklist items. Discussed condition, tests and care plan including RBA. Problem list updated. 1h GTT next.  A/P:  Reji was seen today for prenatal care.    Diagnoses and all orders for this visit:    Cervical insufficiency during pregnancy, antepartum, second trimester    Supervision of high risk pregnancy, antepartum, second trimester    AMA (advanced maternal age) multigravida 35+, second trimester        Main Norwood MD

## 2017-09-14 NOTE — TELEPHONE ENCOUNTER
[facility] Call Center    Phone Message    Name of Caller: Muriel    Phone Number: Other phone number:  [phone]    Best time to return call: Any    May a detailed message be left on voicemail: no    Relation to patient: Other Name: Muriel  Relationship: [facility]  Is there legal documentation in chart to discuss information with this person: NA    Reason for Call: Medication Refill Request    Has the patient contacted the pharmacy for the refill? Yes   Name of medication being requested: hydroxyprogesterone caproate in oil 250 mg/mL (LYDIA) intramuscular injection [91509] (Order [id number])    Provider who prescribed the medication: Dr. Norwood  Pharmacy: [facility]  Date medication is needed: ASAp   They have already received the PA and everything has been approved. Pharmacy states a verbal order is okay to fill otherwise it can be E-scribed. Please advise.         Action Taken: Message routed to:  [facility] p [phone]

## 2017-09-14 NOTE — TELEPHONE ENCOUNTER
Patient is returning phone call to clinic. Daniel attempted to assist patient with schedule appointment but patient states she would rather speak with Vianey. Please advise.

## 2017-09-14 NOTE — TELEPHONE ENCOUNTER
Noted prior authorization for Lakshmi was approved. I called and verified with [employer] that the medication is covered.  PA reference number is [id number].  Good from 9/13/2017-1/15/2018.  She will have a co-pay of 30-50$ each visit with max out of packet$ 2900.00. Ordered medication from our wholesale rep x 2 doses. I spoke with the [facility] pharmacy and informed them that this medication is a buy from clinic stock and bill.  Called patient and asked that she call her insurance to ask what her co pay coverage is so that she is aware when she comes for the administration of the injections. Patient needs to be scheduled on the nurses schedule at  [facility].    Vianey Perry RN

## 2017-09-15 ENCOUNTER — ALLIED HEALTH/NURSE VISIT (OUTPATIENT)
Dept: NURSING | Facility: CLINIC | Age: 37
End: 2017-09-15
Payer: COMMERCIAL

## 2017-09-15 VITALS
OXYGEN SATURATION: 100 % | RESPIRATION RATE: 16 BRPM | HEART RATE: 86 BPM | SYSTOLIC BLOOD PRESSURE: 113 MMHG | TEMPERATURE: 98.3 F | DIASTOLIC BLOOD PRESSURE: 74 MMHG

## 2017-09-15 DIAGNOSIS — O34.32 CERVICAL INSUFFICIENCY DURING PREGNANCY, ANTEPARTUM, SECOND TRIMESTER: ICD-10-CM

## 2017-09-15 DIAGNOSIS — O09.522 AMA (ADVANCED MATERNAL AGE) MULTIGRAVIDA 35+, SECOND TRIMESTER: ICD-10-CM

## 2017-09-15 DIAGNOSIS — O09.92 SUPERVISION OF HIGH RISK PREGNANCY, ANTEPARTUM, SECOND TRIMESTER: ICD-10-CM

## 2017-09-15 PROCEDURE — 96372 THER/PROPH/DIAG INJ SC/IM: CPT

## 2017-09-15 PROCEDURE — 99207 ZZC NO CHARGE NURSE ONLY: CPT

## 2017-09-15 NOTE — TELEPHONE ENCOUNTER
Patient called back.  She is aware the medication is covered because the PA was approved  but she could have a 30-50$ copay.  Patient understands.  She is scheduled today at 1pm at .  I will be the RN to administer the medication.  I placed weekly standing orders for this injection.  See open orders.  Vianey Perry RN

## 2017-09-15 NOTE — TELEPHONE ENCOUNTER
I called patient back.  She stated her insurance needs to know the diagnostic codes for the [place] injection.  I gave her the codes listed below.  Patient will verify her cost and then will call back to schedule the nurse visit-preferably today.  I explained that I would be the RN to administer the injection at the  [facility].  Associated Diagnoses      Cervical insufficiency during pregnancy, antepartum, second trimester [O34.32]  - Primary         Supervision of high risk pregnancy, antepartum, second trimester [O09.92]         History of  delivery, currently pregnant in second trimester [O09.212]           Vianey Perry RN

## 2017-09-15 NOTE — NURSING NOTE
The following medication was given:     MEDICATION: [place]  ROUTE: IM  SITE: RUQ - Gluteus  DOSE: 250mg/1ml  LOT #: [id number]  :  [employer]  EXPIRATION DATE:  10/2019  NDC#: 46165-117-40   Today was patient's first dose of the Lakshmi.  She tolerated the injection well.  She waited in clinic x 20 minutes with no side effects of signs of allergic reaction.  Patient understands her bottom will be sore from the injection so she can take Tylenol or Ibuprofen as needed. She can also apply ice to the area if needed.  I educated her that the more active she can be, the faster the medication will disperse from the muscle decreasing pain to the injection site.  Patient did have an  for today's appointment.  Patient stated she feels comfortable not having an  every visit.  I did schedule the next 3 injection appointments.  The medication is from our clinic stock so she needs to be billed for this medication.  Her [facility] is aware that we are doing the buy and bill process as we can't order this medication from patient's pharmacy.  Vianey Perry RN

## 2017-09-15 NOTE — MR AVS SNAPSHOT
After Visit Summary   9/15/2017    Reji Lockwood    MRN: [id number]           Patient Information     Date Of Birth          1980        Visit Information        Provider Department      9/15/2017 12:45 PM [facility]; [facility]        Today's Diagnoses     Cervical insufficiency during pregnancy, antepartum, second trimester        Supervision of high risk pregnancy, antepartum, second trimester        AMA (advanced maternal age) multigravida 35+, second trimester           Follow-ups after your visit        Your next 10 appointments already scheduled     Sep 22, 2017 10:00 AM CDT   Nurse Only with [facility]URSE ONLY [facility]   [facility] ([facility])    [address]  [facility] [address]   [phone]            Sep 29, 2017 11:00 AM CDT   [facility]URSE ONLY [facility]   [facility] ([facility])    [address]  [facility] [address]   [phone]            Oct 06, 2017 11:00 AM CDT   N[facility] Only [facility]URSE ONLY [facility]   [facility] ([facility])    [address]  [facility] [address]   [phone]            Oct 09, 2017  9:00 AM CDT   US ABDOMEN/PELVIS DUPLEX COMPLETE with MGUS1, MG US TECH   [facility] ([facility]    [address]  [facility] [address]   [phone]           Please bring a list of your medicines (including vitamins, minerals and over-the-counter drugs). Also, tell your doctor about any allergies you may have. Wear comfortable clothes and leave your valuables at home.  Adults: No eating or drinking for 8 hours before the exam. You may take medicine with a small sip of water.  Children: - Children 6+ years: No food or drink for 6 hours before exam. - Children 1-5 years: No food or drink for 4 hours before exam. -  Infants, breast-fed: may have breast milk up to 2 hours before exam. - Infants, formula: may have bottle until 4 hours before exam.  Please call the Imaging Department at your exam site with any questions.            Oct 09, 2017  9:50 AM CDT   LAB with LAB FIRST FLOOR [facility] ([facility])    [address] [place] [id number] [phone]           Patient must bring picture ID. Patient should be prepared to give a urine specimen  Please do not eat 10-12 hours before your appointment if you are coming in fasting for labs on lipids, cholesterol, or glucose (sugar). Pregnant women should follow their Care Team instructions. Water with medications is okay. Do not drink coffee or other fluids. If you have concerns about taking  your medications, please ask at office or if scheduling via SmartStart, send a message by clicking on Secure Messaging, Message Your Care Team.            Oct 11, 2017  9:00 AM CDT   LAB with BK LAB   [facility]    [address] [facility] [facility] [address] [phone]           Patient must bring picture ID. Patient should be prepared to give a urine specimen  Please do not eat 10-12 hours before your appointment if you are coming in fasting for labs on lipids, cholesterol, or glucose (sugar). Pregnant women should follow their Care Team instructions. Water with medications is okay. Do not drink coffee or other fluids. If you have concerns about taking  your medications, please ask at office or if scheduling via SmartStart, send a message by clicking on Secure Messaging, Message Your Care Team.            Oct 11, 2017 10:00 AM CDT   ESTABLISHED PRENATAL with Main Norwood MD   [facility]    [address] [facility] [address] [phone]            Oct 26, 2017  3:00 PM CDT   Return Visit with Breana  "MD Erwin   [facility] ([facility])    [address] [address] [place] [address] [phone]              Future tests that were ordered for you today     Open Standing Orders        Priority Remaining Interval Expires Ordered    HYDROXYPROGESTERONE  Routine /15 weekly 2/15/2018 9/15/2017            Who to contact     If you have questions or need follow up information about today's clinic visit or your schedule please contact [facility] directly at [phone].  Normal or non-critical lab and imaging results will be communicated to you by SlideSharehart, letter or phone within 4 business days after the clinic has received the results. If you do not hear from us within 7 days, please contact the clinic through SlideSharehart or phone. If you have a critical or abnormal lab result, we will notify you by phone as soon as possible.  Submit refill requests through I'mOK or call your pharmacy and they will forward the refill request to us. Please allow 3 business days for your refill to be completed.          Additional Information About Your Visit        MyChart Information     I'mOK lets you send messages to your doctor, view your test results, renew your prescriptions, schedule appointments and more. To sign up, go to [email or web address] . Click on \"Log in\" on the left side of the screen, which will take you to the Welcome page. Then click on \"Sign up Now\" on the right side of the page.     You will be asked to enter the access code listed below, as well as some personal information. Please follow the directions to create your username and password.     Your access code is: [email or web address]  Expires: 12/10/2017  3:39 PM     Your access code will  in 90 days. If you need help or a new code, please call your [facility] or [phone].        Care EveryWhere ID     This is your Care EveryWhere ID. This could be used by other organizations to " access your [facility] medical records  [id number]        Your Vitals Were     Pulse Temperature Respirations Last Period Pulse Oximetry       86 98.3  F (36.8  C) (Oral) 16 03/28/2017 (Within Weeks) 100%        Blood Pressure from Last 3 Encounters:   09/15/17 113/74   09/13/17 111/75   09/11/17 130/69    Weight from Last 3 Encounters:   09/13/17 62.6 kg (138 lb)   09/11/17 60.8 kg (134 lb)   08/10/17 61.2 kg (135 lb)              We Performed the Following     HYDROXYPROGESTERONE      INJECTION INTRAMUSCULAR OR SUB-Q        Primary Care Provider Office Phone # Fax #    Fadia Ho Ubaldo Serrano [phone] [address] [place]  [facility] [address]        Equal Access to Services     [facility]: Hadii aad ku hadasho Soomaali, waaxda luqadaha, qaybta kaalmada adeegyada, ana wesley hayyanira daily . So [facility] [phone].    ATENCIÓN: Si habla español, tiene a gilliam disposición servicios gratuitos de asistencia lingüística. Llame al [phone].    We comply with applicable federal civil rights laws and [place] laws. We do not discriminate on the basis of race, color, national origin, age, disability sex, sexual orientation or gender identity.            Thank you!     Thank you for choosing [facility]  for your care. Our goal is always to provide you with excellent care. Hearing back from our patients is one way we can continue to improve our services. Please take a few minutes to complete the written survey that you may receive in the mail after your visit with us. Thank you!             Your Updated Medication List - Protect others around you: Learn how to safely use, store and throw away your medicines at www.disposemymeds.org.          This list is accurate as of: 9/15/17  1:50 PM.  Always use your most recent med list.                   Brand Name Dispense Instructions for use Diagnosis    * acetaminophen 500 MG tablet    TYLENOL     Take  500-1,000 mg by mouth        * acetaminophen 325 MG tablet    TYLENOL    100 tablet    Take 2 tablets (650 mg) by mouth every 4 hours as needed for mild pain or fever    Cervical insufficiency during pregnancy, antepartum, second trimester       docusate sodium 100 MG tablet    COLACE    60 tablet    Take 100 mg by mouth daily    Cervical insufficiency during pregnancy, antepartum, second trimester       hydroxyprogesterone caproate in oil 250 mg/mL intramuscular injection    LYDIA    5 mL    Inject 1 mL (250 mg) into the muscle every 7 days    Cervical insufficiency during pregnancy, antepartum, second trimester, Supervision of high risk pregnancy, antepartum, second trimester, History of  delivery, currently pregnant in second trimester       ondansetron 4 MG ODT tab    ZOFRAN ODT    30 tablet    Take 1-2 tablets (4-8 mg) by mouth every 8 hours as needed for nausea    Nausea/vomiting in pregnancy       prenatal multivitamin plus iron 27-0.8 MG Tabs per tablet     100 tablet    Take 1 tablet by mouth daily    Pregnancy test positive       * Notice:  This list has 2 medication(s) that are the same as other medications prescribed for you. Read the directions carefully, and ask your doctor or other care provider to review them with you.

## 2017-09-21 ENCOUNTER — TRANSFERRED RECORDS (OUTPATIENT)
Dept: HEALTH INFORMATION MANAGEMENT | Facility: CLINIC | Age: 37
End: 2017-09-21

## 2017-09-22 ENCOUNTER — ALLIED HEALTH/NURSE VISIT (OUTPATIENT)
Dept: NURSING | Facility: CLINIC | Age: 37
End: 2017-09-22
Payer: COMMERCIAL

## 2017-09-22 DIAGNOSIS — O09.522 AMA (ADVANCED MATERNAL AGE) MULTIGRAVIDA 35+, SECOND TRIMESTER: ICD-10-CM

## 2017-09-22 DIAGNOSIS — O09.92 SUPERVISION OF HIGH RISK PREGNANCY, ANTEPARTUM, SECOND TRIMESTER: ICD-10-CM

## 2017-09-22 DIAGNOSIS — O34.32 CERVICAL INSUFFICIENCY DURING PREGNANCY, ANTEPARTUM, SECOND TRIMESTER: ICD-10-CM

## 2017-09-22 PROCEDURE — 99207 ZZC NO CHARGE NURSE ONLY: CPT

## 2017-09-22 PROCEDURE — 96372 THER/PROPH/DIAG INJ SC/IM: CPT

## 2017-09-22 NOTE — MR AVS SNAPSHOT
After Visit Summary   9/22/2017    Reji Lockwood    MRN: [id number]           Patient Information     Date Of Birth          1980        Visit Information        Provider Department      9/22/2017 9:45 AM [facility] [employer]; [facility]        Today's Diagnoses     Cervical insufficiency during pregnancy, antepartum, second trimester        Supervision of high risk pregnancy, antepartum, second trimester        AMA (advanced maternal age) multigravida 35+, second trimester           Follow-ups after your visit        Your next 10 appointments already scheduled     Sep 29, 2017 11:00 AM CDT   [facility]   [facility] ([facility])    [address] [place] [address] [address]  [facility] [address]   [phone]            Oct 06, 2017 11:00 AM CDT   [facility] Only [facility]UR[facility] ONLY [facility]   [facility] ([facility])    [address] [place] [address] [address]  [facility] [address]   [phone]            Oct 09, 2017  9:00 AM CDT   US ABDOMEN/PELVIS DUPLEX COMPLETE with MGUS1, MG US TECH   [facility] ([facility])    [address] [place] [address] [address]  [facility] [address]   [phone]           Please bring a list of your medicines (including vitamins, minerals and over-the-counter drugs). Also, tell your doctor about any allergies you may have. Wear comfortable clothes and leave your valuables at home.  Adults: No eating or drinking for 8 hours before the exam. You may take medicine with a small sip of water.  Children: - Children 6+ years: No food or drink for 6 hours before exam. - Children 1-5 years: No food or drink for 4 hours before exam. - Infants, breast-fed: may have breast milk up to 2 hours before exam. - Infants, formula: may have bottle until 4 hours before exam.  Please call the Imaging Department at your exam site with any questions.             Oct 09, 2017  9:50 AM CDT   LAB with LAB FIRST FLOOR [facility] ([facility])    [address] [facility] [address] [phone]           Patient must bring picture ID. Patient should be prepared to give a urine specimen  Please do not eat 10-12 hours before your appointment if you are coming in fasting for labs on lipids, cholesterol, or glucose (sugar). Pregnant women should follow their Care Team instructions. Water with medications is okay. Do not drink coffee or other fluids. If you have concerns about taking  your medications, please ask at office or if scheduling via [email or web address], send a message by clicking on Secure Messaging, Message Your Care Team.            Oct 11, 2017  9:00 AM CDT   LAB with BK LAB   [facility] ([facility])    [address] [facility] [address] [phone]           Patient must bring picture ID. Patient should be prepared to give a urine specimen  Please do not eat 10-12 hours before your appointment if you are coming in fasting for labs on lipids, cholesterol, or glucose (sugar). Pregnant women should follow their Care Team instructions. Water with medications is okay. Do not drink coffee or other fluids. If you have concerns about taking  your medications, please ask at office or if scheduling via [email or web address], send a message by clicking on Secure Messaging, Message Your Care Team.            Oct 11, 2017 10:00 AM CDT   ESTABLISHED PRENATAL with Main Norwood MD   [facility] ([facility])    [address] [facility] [address] [phone]            Oct 26, 2017  3:00 PM CDT   Return Visit with Breana Hood MD   [facility] ([facility])    [address] [facility] [address] [phone]              Who to contact     If you have questions or need follow up  "information about today's clinic visit or your schedule please contact [facility] directly at [phone].  Normal or non-critical lab and imaging results will be communicated to you by The Learning ExperienceAcademyhart, letter or phone within 4 business days after the clinic has received the results. If you do not hear from us within 7 days, please contact the clinic through The Learning ExperienceAcademyhart or phone. If you have a critical or abnormal lab result, we will notify you by phone as soon as possible.  Submit refill requests through Temp[employer] or call your pharmacy and they will forward the refill request to us. Please allow 3 business days for your refill to be completed.          Additional Information About Your Visit        MyChart Information     [employer] lets you send messages to your doctor, view your test results, renew your prescriptions, schedule appointments and more. To sign up, go to [email or web address] . Click on \"Log in\" on the left side of the screen, which will take you to the Welcome page. Then click on \"Sign up Now\" on the right side of the page.     You will be asked to enter the access code listed below, as well as some personal information. Please follow the directions to create your username and password.     Your access code is: [email or web address]  Expires: 12/10/2017  3:39 PM     Your access code will  in 90 days. If you need help or a new code, please call your [facility] clinic or [phone].        Care EveryWhere ID     This is your Care EveryWhere ID. This could be used by other organizations to access your [facility] medical records  [id number]        Your Vitals Were     Last Period                   2017 (Within Weeks)            Blood Pressure from Last 3 Encounters:   09/15/17 113/74   17 111/75   17 130/69    Weight from Last 3 Encounters:   17 62.6 kg (138 lb)   17 60.8 kg (134 lb)   08/10/17 61.2 kg (135 lb)              We Performed the Following     " HYDROXYPROGESTERONE      INJECTION INTRAMUSCULAR OR SUB-Q        Primary Care Provider Office Phone # Fax Bull Ho Ubaldo Serrano [phone] [address] [place]  [facility] [address]        Equal Access to Services     [facility] ANNY : Hadii aad ku hadasho Soomaali, waaxda luqadaha, qaybta kaalmada adeegyada, waxay bekain hayaan wilbert kirtbob daily . So [facility] [phone].    ATENCIÓN: Si habla español, tiene a gilliam disposición servicios gratuitos de asistencia lingüística. Deborah al [phone].    We comply with applicable federal civil rights laws and [place] laws. We do not discriminate on the basis of race, color, national origin, age, disability sex, sexual orientation or gender identity.            Thank you!     Thank you for choosing [facility]  for your care. Our goal is always to provide you with excellent care. Hearing back from our patients is one way we can continue to improve our services. Please take a few minutes to complete the written survey that you may receive in the mail after your visit with us. Thank you!             Your Updated Medication List - Protect others around you: Learn how to safely use, store and throw away your medicines at www.disposemymeds.org.          This list is accurate as of: 9/22/17 11:06 AM.  Always use your most recent med list.                   Brand Name Dispense Instructions for use Diagnosis    * acetaminophen 500 MG tablet    TYLENOL     Take 500-1,000 mg by mouth        * acetaminophen 325 MG tablet    TYLENOL    100 tablet    Take 2 tablets (650 mg) by mouth every 4 hours as needed for mild pain or fever    Cervical insufficiency during pregnancy, antepartum, second trimester       docusate sodium 100 MG tablet    COLACE    60 tablet    Take 100 mg by mouth daily    Cervical insufficiency during pregnancy, antepartum, second trimester       hydroxyprogesterone caproate in oil 250 mg/mL intramuscular  injection    LYDIA    5 mL    Inject 1 mL (250 mg) into the muscle every 7 days    Cervical insufficiency during pregnancy, antepartum, second trimester, Supervision of high risk pregnancy, antepartum, second trimester, History of  delivery, currently pregnant in second trimester       ondansetron 4 MG ODT tab    ZOFRAN ODT    30 tablet    Take 1-2 tablets (4-8 mg) by mouth every 8 hours as needed for nausea    Nausea/vomiting in pregnancy       prenatal multivitamin plus iron 27-0.8 MG Tabs per tablet     100 tablet    Take 1 tablet by mouth daily    Pregnancy test positive       * Notice:  This list has 2 medication(s) that are the same as other medications prescribed for you. Read the directions carefully, and ask your doctor or other care provider to review them with you.

## 2017-09-22 NOTE — NURSING NOTE
MEDICATION: Hydroxyprogesterone Caproate 250mg/mL in oil   ROUTE: IM  SITE: LUQ - Gluteus  DOSE: 1 ml  LOT #: [id number]  :  [employer]  EXPIRATION DATE:  09/2019  NDC#: 32059-273-02  Patient was seen in clinic for injection and was accompanied by a  . Patient denied any side effects from first injection. Patient tolerated injection without complaints. Verified patient knew about next injection appointment. Patient ambulated out of clinic accompanied by . Ca Virgen RN, BAN

## 2017-09-29 ENCOUNTER — ALLIED HEALTH/NURSE VISIT (OUTPATIENT)
Dept: NURSING | Facility: CLINIC | Age: 37
End: 2017-09-29
Payer: COMMERCIAL

## 2017-09-29 VITALS
HEART RATE: 76 BPM | TEMPERATURE: 97.7 F | OXYGEN SATURATION: 98 % | DIASTOLIC BLOOD PRESSURE: 66 MMHG | SYSTOLIC BLOOD PRESSURE: 104 MMHG

## 2017-09-29 DIAGNOSIS — O09.522 AMA (ADVANCED MATERNAL AGE) MULTIGRAVIDA 35+, SECOND TRIMESTER: ICD-10-CM

## 2017-09-29 DIAGNOSIS — O34.32 CERVICAL INSUFFICIENCY DURING PREGNANCY, ANTEPARTUM, SECOND TRIMESTER: ICD-10-CM

## 2017-09-29 DIAGNOSIS — O09.92 SUPERVISION OF HIGH RISK PREGNANCY, ANTEPARTUM, SECOND TRIMESTER: ICD-10-CM

## 2017-09-29 PROCEDURE — 96372 THER/PROPH/DIAG INJ SC/IM: CPT

## 2017-09-29 PROCEDURE — 99207 ZZC NO CHARGE NURSE ONLY: CPT

## 2017-09-29 NOTE — NURSING NOTE
"Chief Complaint   Patient presents with     Imm/Inj     Evans       Initial /66 (BP Location: Right arm, Patient Position: Sitting, Cuff Size: Adult Regular)  Pulse 76  Temp 97.7  F (36.5  C) (Oral)  LMP 03/28/2017 (Within Weeks)  SpO2 98% Estimated body mass index is 27.87 kg/(m^2) as calculated from the following:    Height as of 9/11/17: 1.499 m (4' 11\").    Weight as of 9/13/17: 62.6 kg (138 lb).  Medication Reconciliation: complete   Vianey Perry RN    The following medication was given:     MEDICATION: Evans 250mg/ml  ROUTE: IM  SITE: RUQ - Gluteus  DOSE: 250mg/ml  LOT #: [id number]  :  [employer]  EXPIRATION DATE:  10/2019  NDC#: 30393-274-22  Patient tolerated the injection well.  She denied having contractions.  Here with .  Next injection is 10/6/2017. Medication was taken from clinic stock.  Vianey Perry RN  "

## 2017-09-29 NOTE — MR AVS SNAPSHOT
After Visit Summary   9/29/2017    Reji Lockwood    MRN: [id number]           Patient Information     Date Of Birth          1980        Visit Information        Provider Department      9/29/2017 10:45 AM [facility] [employer]; [facility]        Today's Diagnoses     Cervical insufficiency during pregnancy, antepartum, second trimester        Supervision of high risk pregnancy, antepartum, second trimester        AMA (advanced maternal age) multigravida 35+, second trimester           Follow-ups after your visit        Your next 10 appointments already scheduled     Oct 06, 2017 11:00 AM CDT   Nurse Only with [facility]URSE ONLY [facility]   [facility] ([facility])    [address]  [facility] [id number] [phone]            Oct 09, 2017  9:00 AM CDT   US ABDOMEN/PELVIS DUPLEX COMPLETE with MGUS1, [facility])    [address] [place] [address] [phone]           Please bring a list of your medicines (including vitamins, minerals and over-the-counter drugs). Also, tell your doctor about any allergies you may have. Wear comfortable clothes and leave your valuables at home.  Adults: No eating or drinking for 8 hours before the exam. You may take medicine with a small sip of water.  Children: - Children 6+ years: No food or drink for 6 hours before exam. - Children 1-5 years: No food or drink for 4 hours before exam. - Infants, breast-fed: may have breast milk up to 2 hours before exam. - Infants, formula: may have bottle until 4 hours before exam.  Please call the Imaging Department at your exam site with any questions.            Oct 09, 2017  9:50 AM CDT   LAB with LAB FIRST FLOOR [facility] ([facility])    [address] [facility] [address] [phone]           Patient  must bring picture ID. Patient should be prepared to give a urine specimen  Please do not eat 10-12 hours before your appointment if you are coming in fasting for labs on lipids, cholesterol, or glucose (sugar). Pregnant women should follow their Care Team instructions. Water with medications is okay. Do not drink coffee or other fluids. If you have concerns about taking  your medications, please ask at office or if scheduling via Hipbone, send a message by clicking on Secure Messaging, Message Your Care Team.            Oct 11, 2017  9:00 AM CDT   LAB with BK LAB   [facility] ([facility])    [address] [address] [phone]           Patient must bring picture ID. Patient should be prepared to give a urine specimen  Please do not eat 10-12 hours before your appointment if you are coming in fasting for labs on lipids, cholesterol, or glucose (sugar). Pregnant women should follow their Care Team instructions. Water with medications is okay. Do not drink coffee or other fluids. If you have concerns about taking  your medications, please ask at office or if scheduling via Hipbone, send a message by clicking on Secure Messaging, Message Your Care Team.            Oct 11, 2017 10:00 AM CDT   ESTABLISHED PRENATAL with Main Norwood MD   [facility] ([facility])    [address] [place] [id number] [phone]            Oct 26, 2017  3:00 PM CDT   Return Visit with Breana Hood MD   [facility] ([facility])    [address] [place] [address] [phone]              Who to contact     If you have questions or need follow up information about today's clinic visit or your schedule please contact [facility] directly at [phone].  Normal or non-critical lab and imaging results will be communicated to you by  "MyChart, letter or phone within 4 business days after the clinic has received the results. If you do not hear from us within 7 days, please contact the clinic through Iterate Studiohart or phone. If you have a critical or abnormal lab result, we will notify you by phone as soon as possible.  Submit refill requests through [facility] or call your pharmacy and they will forward the refill request to us. Please allow 3 business days for your refill to be completed.          Additional Information About Your Visit        Iterate [email or web address] Information     [facility] lets you send messages to your doctor, view your test results, renew your prescriptions, schedule appointments and more. To sign up, go to [email or web address] . Click on \"Log in\" on the left side of the screen, which will take you to the Welcome page. Then click on \"Sign up Now\" on the right side of the page.     You will be asked to enter the access code listed below, as well as some personal information. Please follow the directions to create your username and password.     Your access code is: [email or web address]  Expires: 12/10/2017  3:39 PM     Your access code will  in 90 days. If you need help or a new code, please call your [place] clinic or [phone].        Care EveryWhere ID     This is your Care EveryWhere ID. This could be used by other organizations to access your [place] medical records  [id number]        Your Vitals Were     Pulse Temperature Last Period Pulse Oximetry          76 97.7  F (36.5  C) (Oral) 2017 (Within Weeks) 98%         Blood Pressure from Last 3 Encounters:   17 104/66   09/15/17 113/74   17 111/75    Weight from Last 3 Encounters:   17 62.6 kg (138 lb)   17 60.8 kg (134 lb)   08/10/17 61.2 kg (135 lb)              We Performed the Following     HYDROXYPROGESTERONE         Primary Care Provider Office Phone # Fax #    Fadia Georgia Serrano [phone]       [address]  [place] [address]        Equal Access to Services     SHAWNAPASTOR ANNY : Hadii aad ku hadjose antoniokisha Sopatali, waitzda luqjoseha, qareginata jamestimangela atkinsestherangela, ana mayamelybob daily . So [facility] [place] [phone].    ATENCIÓN: Si habla español, tiene a gilliam disposición servicios gratuitos de asistencia lingüística. Llame al [phone].    We comply with applicable federal civil rights laws and [place] laws. We do not discriminate on the basis of race, color, national origin, age, disability sex, sexual orientation or gender identity.            Thank you!     Thank you for choosing [facility]  for your care. Our goal is always to provide you with excellent care. Hearing back from our patients is one way we can continue to improve our services. Please take a few minutes to complete the written survey that you may receive in the mail after your visit with us. Thank you!             Your Updated Medication List - Protect others around you: Learn how to safely use, store and throw away your medicines at www.disposemymeds.org.          This list is accurate as of: 9/29/17 11:46 AM.  Always use your most recent med list.                   Brand Name Dispense Instructions for use Diagnosis    * acetaminophen 500 MG tablet    TYLENOL     Take 500-1,000 mg by mouth        * acetaminophen 325 MG tablet    TYLENOL    100 tablet    Take 2 tablets (650 mg) by mouth every 4 hours as needed for mild pain or fever    Cervical insufficiency during pregnancy, antepartum, second trimester       docusate sodium 100 MG tablet    COLACE    60 tablet    Take 100 mg by mouth daily    Cervical insufficiency during pregnancy, antepartum, second trimester       hydroxyprogesterone caproate in oil 250 mg/mL intramuscular injection    LYDIA    5 mL    Inject 1 mL (250 mg) into the muscle every 7 days    Cervical insufficiency during pregnancy, antepartum, second trimester, Supervision of high risk pregnancy,  antepartum, second trimester, History of  delivery, currently pregnant in second trimester       ondansetron 4 MG ODT tab    ZOFRAN ODT    30 tablet    Take 1-2 tablets (4-8 mg) by mouth every 8 hours as needed for nausea    Nausea/vomiting in pregnancy       prenatal multivitamin plus iron 27-0.8 MG Tabs per tablet     100 tablet    Take 1 tablet by mouth daily    Pregnancy test positive       * Notice:  This list has 2 medication(s) that are the same as other medications prescribed for you. Read the directions carefully, and ask your doctor or other care provider to review them with you.

## 2017-10-06 ENCOUNTER — TELEPHONE (OUTPATIENT)
Dept: OBGYN | Facility: CLINIC | Age: 37
End: 2017-10-06

## 2017-10-06 ENCOUNTER — ALLIED HEALTH/NURSE VISIT (OUTPATIENT)
Dept: NURSING | Facility: CLINIC | Age: 37
End: 2017-10-06
Payer: COMMERCIAL

## 2017-10-06 VITALS
DIASTOLIC BLOOD PRESSURE: 67 MMHG | SYSTOLIC BLOOD PRESSURE: 104 MMHG | HEART RATE: 80 BPM | RESPIRATION RATE: 16 BRPM | OXYGEN SATURATION: 99 % | TEMPERATURE: 97.9 F

## 2017-10-06 DIAGNOSIS — O09.522 AMA (ADVANCED MATERNAL AGE) MULTIGRAVIDA 35+, SECOND TRIMESTER: ICD-10-CM

## 2017-10-06 DIAGNOSIS — O09.92 SUPERVISION OF HIGH RISK PREGNANCY, ANTEPARTUM, SECOND TRIMESTER: ICD-10-CM

## 2017-10-06 DIAGNOSIS — O09.522 ELDERLY MULTIGRAVIDA IN SECOND TRIMESTER: ICD-10-CM

## 2017-10-06 DIAGNOSIS — O34.32 CERVICAL INSUFFICIENCY DURING PREGNANCY, ANTEPARTUM, SECOND TRIMESTER: ICD-10-CM

## 2017-10-06 DIAGNOSIS — O09.90 SUPERVISION OF HIGH RISK PREGNANCY, ANTEPARTUM: ICD-10-CM

## 2017-10-06 DIAGNOSIS — O34.30 CERVICAL INSUFFICIENCY DURING PREGNANCY, ANTEPARTUM: ICD-10-CM

## 2017-10-06 PROCEDURE — 96372 THER/PROPH/DIAG INJ SC/IM: CPT

## 2017-10-06 PROCEDURE — 99207 ZZC NO CHARGE NURSE ONLY: CPT

## 2017-10-06 NOTE — TELEPHONE ENCOUNTER
Patient was seen today in clinic for the weekly Troutman injection.  She is experiencing itch at the site of the injection.  She didn't have itch with the firs 2 injections.  Last week she had itch only at the site.  She denied rash,swelling,redness or pain.  I did advise Benadryl if needed for itch.  Did tell her that Benadryl can make her tired so advised she take this at night.  Next OB visit is 10/11/2017.  Side effect routed to Dr. Norwood.  Vianey Perry RN

## 2017-10-06 NOTE — TELEPHONE ENCOUNTER
Noted- limited side-effect at injection site now. Agree with plan and continuing treatment rotating sites.   Main Norwood MD

## 2017-10-06 NOTE — MR AVS SNAPSHOT
After Visit Summary   10/6/2017    Reji Lockwood    MRN: [id number]           Patient Information     Date Of Birth          1980        Visit Information        Provider Department      10/6/2017 10:45 AM [facility] [facility]; NURSE ONLY WOMENS [facility]        Today's Diagnoses     Cervical insufficiency during pregnancy, antepartum, second trimester        Supervision of high risk pregnancy, antepartum, second trimester        AMA (advanced maternal age) multigravida 35+, second trimester           Follow-ups after your visit        Your next 10 appointments already scheduled     Oct 09, 2017  8:45 AM CDT   US ABDOMEN/PELVIS DUPLEX COMPLETE with MGUS1, [facility] TECH   [facility])    [address] [place] [address] [phone]           Please bring a list of your medicines (including vitamins, minerals and over-the-counter drugs). Also, tell your doctor about any allergies you may have. Wear comfortable clothes and leave your valuables at home.  Adults: No eating or drinking for 8 hours before the exam. You may take medicine with a small sip of water.  Children: - Children 6+ years: No food or drink for 6 hours before exam. - Children 1-5 years: No food or drink for 4 hours before exam. - Infants, breast-fed: may have breast milk up to 2 hours before exam. - Infants, formula: may have bottle until 4 hours before exam.  Please call the Imaging Department at your exam site with any questions.            Oct 09, 2017  9:50 AM CDT   LAB with LAB FIRST FLOOR [facility] ([facility] [[email or web address]    [address] [place] [address] [phone]           Patient must bring picture ID. Patient should be prepared to give a urine specimen  Please do not eat 10-12 hours before your appointment if you are coming in fasting for labs on lipids, cholesterol, or glucose (sugar).  Pregnant women should follow their Care Team instructions. Water with medications is okay. Do not drink coffee or other fluids. If you have concerns about taking  your medications, please ask at office or if scheduling via Innotrieve, send a message by clicking on Secure Messaging, Message Your Care Team.            Oct 11, 2017  9:00 AM CDT   LAB with BK LAB   [facility] ([facility])    [address] [address]   [phone]           Patient must bring picture ID. Patient should be prepared to give a urine specimen  Please do not eat 10-12 hours before your appointment if you are coming in fasting for labs on lipids, cholesterol, or glucose (sugar). Pregnant women should follow their Care Team instructions. Water with medications is okay. Do not drink coffee or other fluids. If you have concerns about taking  your medications, please ask at office or if scheduling via Innotrieve, send a message by clicking on Secure Messaging, Message Your Care Team.            Oct 11, 2017 10:00 AM CDT   ESTABLISHED PRENATAL with Main Norwood MD   [facility] ([facility])    [address] [address]   [phone]            Oct 13, 2017 11:00 AM CDT   Nurse Only with NURSE ONLY WOMENS   [facility] ([facility])    [address]  [facility] [address]   [phone]            Oct 20, 2017 11:00 AM CDT   Nurse Only with NURSE ONLY WOMENS   [facility] ([facility])    [address]  [facility] [address]   [phone]            Oct 26, 2017  3:00 PM CDT   Return Visit with Breana Hood MD   [facility] ([facility])    [address]  [facility] [address]   [phone]            Oct 27, 2017 11:00 AM CDT   Nurse Only with NURSE ONLY WOMENS  "  [facility] ([facility])    [address]  [facility] [address] [phone]            2017 11:00 AM CDT   Nurse Only with NURSE ONLY WOMENS   [facility] ([facility])    [address]  [facility] [address] [phone]              Who to contact     If you have questions or need follow up information about today's clinic visit or your schedule please contact [facility] directly at [phone].  Normal or non-critical lab and imaging results will be communicated to you by Georamahart, letter or phone within 4 business days after the clinic has received the results. If you do not hear from us within 7 days, please contact the clinic through Georamahart or phone. If you have a critical or abnormal lab result, we will notify you by phone as soon as possible.  Submit refill requests through Nom[employer] or call your pharmacy and they will forward the refill request to us. Please allow 3 business days for your refill to be completed.          Additional Information About Your Visit        GeoramaharCOINLAB Information     [employer]orc lets you send messages to your doctor, view your test results, renew your prescriptions, schedule appointments and more. To sign up, go to [email or web address] . Click on \"Log in\" on the left side of the screen, which will take you to the Welcome page. Then click on \"Sign up Now\" on the right side of the page.     You will be asked to enter the access code listed below, as well as some personal information. Please follow the directions to create your username and password.     Your access code is: [email or web address]  Expires: 12/10/2017  3:39 PM     Your access code will  in 90 days. If you need help or a new code, please call your [facility] or [phone].        Care EveryWhere ID     This is your Care EveryWhere ID. This could be used by other organizations to " access your [place] medical records  [id number]        Your Vitals Were     Pulse Temperature Respirations Last Period Pulse Oximetry       80 97.9  F (36.6  C) (Oral) 16 03/28/2017 (Within Weeks) 99%        Blood Pressure from Last 3 Encounters:   10/06/17 104/67   09/29/17 104/66   09/15/17 113/74    Weight from Last 3 Encounters:   09/13/17 62.6 kg (138 lb)   09/11/17 60.8 kg (134 lb)   08/10/17 61.2 kg (135 lb)              We Performed the Following     HYDROXYPROGESTERONE      INJECTION INTRAMUSCULAR OR SUB-Q        Primary Care Provider Office Phone # Fax #    Fadia Ho Ubaldo Serrano [phone]       [address] [place]  [facility] [address]        Equal Access to Services     [facility]: Hadii aad ku hadasho Soomaali, waaxda luqadaha, qaybta kaalmada adeegyada, waxay bekain hayyanira daily . So [facility] [phone].    ATENCIÓN: Si habla español, tiene a gilliam disposición servicios gratuitos de asistencia lingüística. Llame al [phone].    We comply with applicable federal civil rights laws and [place] laws. We do not discriminate on the basis of race, color, national origin, age, disability, sex, sexual orientation, or gender identity.            Thank you!     Thank you for choosing [facility]  for your care. Our goal is always to provide you with excellent care. Hearing back from our patients is one way we can continue to improve our services. Please take a few minutes to complete the written survey that you may receive in the mail after your visit with us. Thank you!             Your Updated Medication List - Protect others around you: Learn how to safely use, store and throw away your medicines at www.disposemymeds.org.          This list is accurate as of: 10/6/17 11:45 AM.  Always use your most recent med list.                   Brand Name Dispense Instructions for use Diagnosis    * acetaminophen 500 MG tablet    TYLENOL     Take  500-1,000 mg by mouth        * acetaminophen 325 MG tablet    TYLENOL    100 tablet    Take 2 tablets (650 mg) by mouth every 4 hours as needed for mild pain or fever    Cervical insufficiency during pregnancy, antepartum, second trimester       docusate sodium 100 MG tablet    COLACE    60 tablet    Take 100 mg by mouth daily    Cervical insufficiency during pregnancy, antepartum, second trimester       hydroxyprogesterone caproate in oil 250 mg/mL intramuscular injection    LYDIA    5 mL    Inject 1 mL (250 mg) into the muscle every 7 days    Cervical insufficiency during pregnancy, antepartum, second trimester, Supervision of high risk pregnancy, antepartum, second trimester, History of  delivery, currently pregnant in second trimester       ondansetron 4 MG ODT tab    ZOFRAN ODT    30 tablet    Take 1-2 tablets (4-8 mg) by mouth every 8 hours as needed for nausea    Nausea/vomiting in pregnancy       prenatal multivitamin plus iron 27-0.8 MG Tabs per tablet     100 tablet    Take 1 tablet by mouth daily    Pregnancy test positive       * Notice:  This list has 2 medication(s) that are the same as other medications prescribed for you. Read the directions carefully, and ask your doctor or other care provider to review them with you.

## 2017-10-06 NOTE — NURSING NOTE
The following medication was given:     MEDICATION: Trezevant  ROUTE: IM  SITE: LUQ - Gluteus  DOSE: 250mg/ml  LOT #: [id number]  :  Within3  EXPIRATION DATE:  10/2019  NDC#: 12310-096-29  Patient tolerated the injection well.  She is reporting itch at the site of the injection.  SHe denied itch after the first and second injection.  Itch started last week after the third injection.  SHe denied rash,swelling,redness and pain.  I looked up the drug for side effects and pruritis is common.  I advised she take Benadryl if needed for the itch.  I did recommend she take this at night as it can cause sedation.  Patient to call the clinic if she has worsening symptoms.  Update given to Dr. Norwood.  I scheduled her next 4 injections.  She had an  her for the visit.  All questions were answered.  Medication given from clinic stock.    Vianey Perry RN

## 2017-10-09 ENCOUNTER — RADIANT APPOINTMENT (OUTPATIENT)
Dept: ULTRASOUND IMAGING | Facility: CLINIC | Age: 37
End: 2017-10-09
Attending: INTERNAL MEDICINE
Payer: COMMERCIAL

## 2017-10-09 DIAGNOSIS — B18.1 CARRIER OF HEPATITIS B (H): ICD-10-CM

## 2017-10-09 LAB
ALBUMIN SERPL-MCNC: 2.8 G/DL (ref 3.4–5)
ALBUMIN UR-MCNC: 10 MG/DL
ALP SERPL-CCNC: 60 U/L (ref 40–150)
ALT SERPL W P-5'-P-CCNC: 21 U/L (ref 0–50)
ANION GAP SERPL CALCULATED.3IONS-SCNC: 7 MMOL/L (ref 3–14)
APPEARANCE UR: CLEAR
AST SERPL W P-5'-P-CCNC: 12 U/L (ref 0–45)
BACTERIA #/AREA URNS HPF: ABNORMAL /HPF
BILIRUB DIRECT SERPL-MCNC: 0.1 MG/DL (ref 0–0.2)
BILIRUB SERPL-MCNC: 0.4 MG/DL (ref 0.2–1.3)
BILIRUB UR QL STRIP: NEGATIVE
BUN SERPL-MCNC: 11 MG/DL (ref 7–30)
CALCIUM SERPL-MCNC: 8.5 MG/DL (ref 8.5–10.1)
CHLORIDE SERPL-SCNC: 104 MMOL/L (ref 94–109)
CO2 SERPL-SCNC: 25 MMOL/L (ref 20–32)
COLOR UR AUTO: YELLOW
CREAT SERPL-MCNC: 0.57 MG/DL (ref 0.52–1.04)
ERYTHROCYTE [DISTWIDTH] IN BLOOD BY AUTOMATED COUNT: 13.2 % (ref 10–15)
GFR SERPL CREATININE-BSD FRML MDRD: >90 ML/MIN/1.7M2
GLUCOSE SERPL-MCNC: 90 MG/DL (ref 70–99)
GLUCOSE UR STRIP-MCNC: NEGATIVE MG/DL
HAV IGG SER QL IA: REACTIVE
HCT VFR BLD AUTO: 38.1 % (ref 35–47)
HGB BLD-MCNC: 13.1 G/DL (ref 11.7–15.7)
HGB UR QL STRIP: NEGATIVE
KETONES UR STRIP-MCNC: 10 MG/DL
LEUKOCYTE ESTERASE UR QL STRIP: ABNORMAL
MCH RBC QN AUTO: 30.9 PG (ref 26.5–33)
MCHC RBC AUTO-ENTMCNC: 34.4 G/DL (ref 31.5–36.5)
MCV RBC AUTO: 90 FL (ref 78–100)
NITRATE UR QL: NEGATIVE
NON-SQ EPI CELLS #/AREA URNS LPF: ABNORMAL /LPF
PH UR STRIP: 6.5 PH (ref 5–7)
PLATELET # BLD AUTO: 165 10E9/L (ref 150–450)
POTASSIUM SERPL-SCNC: 3.6 MMOL/L (ref 3.4–5.3)
PROT SERPL-MCNC: 6.7 G/DL (ref 6.8–8.8)
RBC # BLD AUTO: 4.24 10E12/L (ref 3.8–5.2)
RBC #/AREA URNS AUTO: ABNORMAL /HPF
SODIUM SERPL-SCNC: 136 MMOL/L (ref 133–144)
SOURCE: ABNORMAL
SP GR UR STRIP: 1.02 (ref 1–1.03)
UROBILINOGEN UR STRIP-MCNC: NORMAL MG/DL (ref 0–2)
WBC # BLD AUTO: 9.3 10E9/L (ref 4–11)
WBC #/AREA URNS AUTO: ABNORMAL /HPF

## 2017-10-09 PROCEDURE — 99000 SPECIMEN HANDLING OFFICE-LAB: CPT | Performed by: INTERNAL MEDICINE

## 2017-10-09 PROCEDURE — 85027 COMPLETE CBC AUTOMATED: CPT | Performed by: INTERNAL MEDICINE

## 2017-10-09 PROCEDURE — 86708 HEPATITIS A ANTIBODY: CPT | Performed by: INTERNAL MEDICINE

## 2017-10-09 PROCEDURE — 86707 HEPATITIS BE ANTIBODY: CPT | Mod: 90 | Performed by: INTERNAL MEDICINE

## 2017-10-09 PROCEDURE — 87340 HEPATITIS B SURFACE AG IA: CPT | Performed by: INTERNAL MEDICINE

## 2017-10-09 PROCEDURE — 80048 BASIC METABOLIC PNL TOTAL CA: CPT | Performed by: INTERNAL MEDICINE

## 2017-10-09 PROCEDURE — 87350 HEPATITIS BE AG IA: CPT | Mod: 90 | Performed by: INTERNAL MEDICINE

## 2017-10-09 PROCEDURE — 93975 VASCULAR STUDY: CPT | Performed by: RADIOLOGY

## 2017-10-09 PROCEDURE — 76700 US EXAM ABDOM COMPLETE: CPT | Mod: 59 | Performed by: RADIOLOGY

## 2017-10-09 PROCEDURE — 87341 HEP B SURFACE AG NEUTRLZJ IA: CPT | Performed by: INTERNAL MEDICINE

## 2017-10-09 PROCEDURE — 87517 HEPATITIS B DNA QUANT: CPT | Performed by: INTERNAL MEDICINE

## 2017-10-09 PROCEDURE — 86692 HEPATITIS DELTA AGENT ANTBDY: CPT | Mod: 90 | Performed by: INTERNAL MEDICINE

## 2017-10-09 PROCEDURE — 80076 HEPATIC FUNCTION PANEL: CPT | Performed by: INTERNAL MEDICINE

## 2017-10-09 PROCEDURE — 36415 COLL VENOUS BLD VENIPUNCTURE: CPT | Performed by: INTERNAL MEDICINE

## 2017-10-09 PROCEDURE — 81001 URINALYSIS AUTO W/SCOPE: CPT | Performed by: INTERNAL MEDICINE

## 2017-10-10 LAB
HBV DNA SERPL NAA+PROBE-ACNC: ABNORMAL [IU]/ML
HBV DNA SERPL NAA+PROBE-LOG IU: >8.2 {LOG_IU}/ML
HBV E AB SERPL QL IA: NEGATIVE
HBV E AG SERPL QL IA: POSITIVE
HBV SURFACE AG SERPL QL IA: REACTIVE
HDV AB SER QL: NEGATIVE

## 2017-10-11 ENCOUNTER — PRENATAL OFFICE VISIT (OUTPATIENT)
Dept: OBGYN | Facility: CLINIC | Age: 37
End: 2017-10-11
Payer: COMMERCIAL

## 2017-10-11 VITALS
SYSTOLIC BLOOD PRESSURE: 116 MMHG | BODY MASS INDEX: 28.68 KG/M2 | WEIGHT: 142 LBS | HEART RATE: 79 BPM | DIASTOLIC BLOOD PRESSURE: 71 MMHG | TEMPERATURE: 97.4 F

## 2017-10-11 DIAGNOSIS — O34.30 CERVICAL INSUFFICIENCY DURING PREGNANCY, ANTEPARTUM: ICD-10-CM

## 2017-10-11 DIAGNOSIS — O09.522 ELDERLY MULTIGRAVIDA IN SECOND TRIMESTER: ICD-10-CM

## 2017-10-11 DIAGNOSIS — O24.419 GESTATIONAL DIABETES MELLITUS (GDM), ANTEPARTUM, GESTATIONAL DIABETES METHOD OF CONTROL UNSPECIFIED: ICD-10-CM

## 2017-10-11 DIAGNOSIS — O09.90 SUPERVISION OF HIGH RISK PREGNANCY, ANTEPARTUM: Primary | ICD-10-CM

## 2017-10-11 DIAGNOSIS — O09.92 SUPERVISION OF HIGH RISK PREGNANCY IN SECOND TRIMESTER: Primary | ICD-10-CM

## 2017-10-11 LAB — GLUCOSE 1H P 50 G GLC PO SERPL-MCNC: 274 MG/DL (ref 60–129)

## 2017-10-11 PROCEDURE — 99207 ZZC PRENATAL VISIT: CPT | Performed by: OBSTETRICS & GYNECOLOGY

## 2017-10-11 PROCEDURE — 82950 GLUCOSE TEST: CPT | Performed by: OBSTETRICS & GYNECOLOGY

## 2017-10-11 PROCEDURE — 36415 COLL VENOUS BLD VENIPUNCTURE: CPT | Performed by: OBSTETRICS & GYNECOLOGY

## 2017-10-11 NOTE — NURSING NOTE
"Chief Complaint   Patient presents with     Prenatal Care       Initial /71 (BP Location: Left arm, Patient Position: Chair, Cuff Size: Adult Regular)  Pulse 79  Temp 97.4  F (36.3  C) (Oral)  Wt 142 lb (64.4 kg)  LMP 03/28/2017 (Within Weeks)  Breastfeeding? No  BMI 28.68 kg/m2 Estimated body mass index is 28.68 kg/(m^2) as calculated from the following:    Height as of 9/11/17: 4' 11\" (1.499 m).    Weight as of this encounter: 142 lb (64.4 kg).  Medication Reconciliation: complete   Kezia Crump CMA      "

## 2017-10-11 NOTE — PATIENT INSTRUCTIONS
If you have any questions regarding your visit, Please contact your care team.     [employer] [facility]: [phone]    [facility] CLINIC HOURS TELEPHONE NUMBER   ANN Zaragoza-    Maddie Penaloza-NAYA Mast-Medical Assistant   Monday-[place]  8:00a.m-4:45 p.m  Wednesday-[place] 8:00a.m-4:45 p.m.  Thursday-[place]  8:00a.m-4:45 p.m.  Friday-[place]  8:00a.m-4:45 p.m. [facility]  [address]  [place], [place] [address] [phone] ask [facility]  [phone] Fax  Imaging [phone]    [facility] Labor and Delivery  [address] [place] [address] Dr.  [place], [place] [address] [phone]    [facility]  [address] [place] [place] [address]  [phone] ask [facility]  [phone] Fax  Imaging [phone]     Urgent Care locations:    [place]        [place] Monday-Friday  5 pm - 9 pm  Saturday and Sunday   9 am - 5 pm    Monday-Friday   11 am - 9 pm  Saturday and Sunday   9 am - 5 pm   [phone]       If you need a medication refill, please contact your pharmacy. Please allow 3 business days for your refill to be completed.  As always, Thank you for trusting us with your healthcare needs!

## 2017-10-11 NOTE — PROGRESS NOTES
No signif signs, symptoms or concerns except care plans reviewed. Flu vac declined. Here with . Advice appropriate to gestational age reviewed including pertinent Checklist items. Discussed condition, tests and care plan including RBA. Problem list updated.   A/P:  Reji was seen today for prenatal care.    Diagnoses and all orders for this visit:    Supervision of high risk pregnancy, antepartum  -     Cancel: Glucose tolerance gest screen 1 hour    Cervical insufficiency during pregnancy, antepartum    Elderly multigravida in second trimester    Other orders  -     Cancel: Glucose tolerance gest screen 1 hour  -     Cancel: OB hemoglobin        Main Norwood MD

## 2017-10-11 NOTE — MR AVS SNAPSHOT
After Visit Summary   10/11/2017    Reji Lockwood    MRN: [id number]           Patient Information     Date Of Birth          1980        Visit Information        Provider Department      10/11/2017 10:00 AM Main Norwood MD; [facility]        Today's Diagnoses     Supervision of high risk pregnancy, antepartum    -  1    Cervical insufficiency during pregnancy, antepartum        Elderly multigravida in second trimester          Care Instructions                                                        If you have any questions regarding your visit, Please contact your care team.     [facility] Access Services: [phone]    [facility] CLINIC HOURS TELEPHONE NUMBER   Main Norwood M.D.      Cristal-    Maddie Penaloza-RN    Sudhai-Medical Assistant   Monday-[place]  8:00a.m-4:45 p.m  Wednesday-[place] 8:00a.m-4:45 p.m.  Thursday-[place]  8:00a.m-4:45 p.m.  Friday-[place]  8:00a.m-4:45 p.m. [facility]  [address] [place], [place] [address] [phone] ask [facility]  [phone] Fax  Imaging [phone]     Labor and Delivery  [address] [place] [address] Dr.  [place], [place] [address] [phone]    [facility]  [address] [place], [place] [address] [phone] ask [facility]  [phone] Fax  Imaging [phone]     Urgent Care locations:    [facility] Monday-Friday  5 pm - 9 pm  Saturday and Sunday   9 am - 5 pm    Monday-Friday   11 am - 9 pm  Saturday and Sunday   9 am - 5 pm   [phone]       If you need a medication refill, please contact your pharmacy. Please allow 3 business days for your refill to be completed.  As always, Thank you for trusting us with your healthcare needs!            Follow-ups after your visit        Your next 10 appointments already scheduled     Oct 13,  2017 11:00 AM CDT   Nurse Only with NURSE ONLY [facility]   [place]    [address]  [facility] [address]   [phone]            Oct 20, 2017 11:00 AM CDT   Nurse Only with NURSE ONLY [facility]   [place]    [address]  [facility] [address]   [phone]            Oct 26, 2017  3:00 PM CDT   Return Visit with Breana Hood MD   [facility]    [address]  [facility] [address]   [phone]            Oct 27, 2017 11:00 AM CDT   Nurse Only with NURSE ONLY [facility]   [place]    [address]  [facility] [address]   [phone]            Nov 03, 2017 11:00 AM CDT   Nurse Only with NURSE ONLY [facility]   [place]    [address]  [facility] [address]   [phone]              Who to contact     If you have questions or need follow up information about today's clinic visit or your schedule please contact [facility] directly at [phone].  Normal or non-critical lab and imaging results will be communicated to you by MyChart, letter or phone within 4 business days after the clinic has received the results. If you do not hear from us within 7 days, please contact the clinic through [employer] Food[employer] or phone. If you have a critical or abnormal lab result, we will notify you by phone as soon as possible.  Submit refill requests through OnAir3G or call your pharmacy and they will forward the refill request to us. Please allow 3 business days for your refill to be completed.          Additional Information About Your Visit        MyChart Information     OnAir3G lets you send messages to your doctor, view your test results, renew your prescriptions, schedule  "appointments and more. To sign up, go to [email or web address] . Click on \"Log in\" on the left side of the screen, which will take you to the Welcome page. Then click on \"Sign up Now\" on the right side of the page.     You will be asked to enter the access code listed below, as well as some personal information. Please follow the directions to create your username and password.     Your access code is: [email or web address]  Expires: 12/10/2017  3:39 PM     Your access code will  in 90 days. If you need help or a new code, please call your [facility] clinic or [phone].        Care EveryWhere ID     This is your Care EveryWhere ID. This could be used by other organizations to access your [facility] medical records  [id number]        Your Vitals Were     Pulse Temperature Last Period Breastfeeding? BMI (Body Mass Index)       79 97.4  F (36.3  C) (Oral) 2017 (Within Weeks) No 28.68 kg/m2        Blood Pressure from Last 3 Encounters:   10/11/17 116/71   10/06/17 104/67   17 104/66    Weight from Last 3 Encounters:   10/11/17 142 lb (64.4 kg)   17 138 lb (62.6 kg)   17 134 lb (60.8 kg)              Today, you had the following     No orders found for display       Primary Care Provider Office Phone # Fax #    Fadia Georgia Serrano [phone]       [address]  [place] [facility] [address]        Equal Access to Services     [facility]: Hadii laurie Giang, waaxda luqadaha, qaybta kaalmatthew hunter, ana salvador. So [facility] [phone].    ATENCIÓN: Si habla español, tiene a gilliam disposición servicios gratuitos de asistencia lingüística. Llame al [phone].    We comply with applicable federal civil rights laws and [place] laws. We do not discriminate on the basis of race, color, national origin, age, disability, sex, sexual orientation, or gender identity.            Thank you!     Thank you for choosing [facility] " [facility]  for your care. Our goal is always to provide you with excellent care. Hearing back from our patients is one way we can continue to improve our services. Please take a few minutes to complete the written survey that you may receive in the mail after your visit with us. Thank you!             Your Updated Medication List - Protect others around you: Learn how to safely use, store and throw away your medicines at www.disposemymeds.org.          This list is accurate as of: 10/11/17 10:09 AM.  Always use your most recent med list.                   Brand Name Dispense Instructions for use Diagnosis    * acetaminophen 500 MG tablet    TYLENOL     Take 500-1,000 mg by mouth        * acetaminophen 325 MG tablet    TYLENOL    100 tablet    Take 2 tablets (650 mg) by mouth every 4 hours as needed for mild pain or fever    Cervical insufficiency during pregnancy, antepartum, second trimester       docusate sodium 100 MG tablet    COLACE    60 tablet    Take 100 mg by mouth daily    Cervical insufficiency during pregnancy, antepartum, second trimester       hydroxyprogesterone caproate in oil 250 mg/mL intramuscular injection    LYDIA    5 mL    Inject 1 mL (250 mg) into the muscle every 7 days    Cervical insufficiency during pregnancy, antepartum, second trimester, Supervision of high risk pregnancy, antepartum, second trimester, History of  delivery, currently pregnant in second trimester       ondansetron 4 MG ODT tab    ZOFRAN ODT    30 tablet    Take 1-2 tablets (4-8 mg) by mouth every 8 hours as needed for nausea    Nausea/vomiting in pregnancy       prenatal multivitamin plus iron 27-0.8 MG Tabs per tablet     100 tablet    Take 1 tablet by mouth daily    Pregnancy test positive       * Notice:  This list has 2 medication(s) that are the same as other medications prescribed for you. Read the directions carefully, and ask your doctor or other care provider to review them with you.

## 2017-10-12 ENCOUNTER — TELEPHONE (OUTPATIENT)
Dept: OBGYN | Facility: CLINIC | Age: 37
End: 2017-10-12

## 2017-10-12 DIAGNOSIS — O34.30 CERVICAL INSUFFICIENCY DURING PREGNANCY, ANTEPARTUM: ICD-10-CM

## 2017-10-12 DIAGNOSIS — O09.522 ELDERLY MULTIGRAVIDA IN SECOND TRIMESTER: ICD-10-CM

## 2017-10-12 DIAGNOSIS — O09.90 SUPERVISION OF HIGH RISK PREGNANCY, ANTEPARTUM: ICD-10-CM

## 2017-10-12 NOTE — TELEPHONE ENCOUNTER
Patient was contacted via  Services and scheduled to meet with CDE for GDM training. Scheduled for Monday Oct.16th at 1130 am at  with Елена Alvarado RD CDE. Pt is requesting a call from  to discuss glucose results. Pt has many questions about a retest and how high her results were. Please call patient at home number to advise. OK to LM. Thank you.    Cecile Gimenez CMA  Diabetes and Nutrition  Scheduling Outreach

## 2017-10-13 ENCOUNTER — ALLIED HEALTH/NURSE VISIT (OUTPATIENT)
Dept: NURSING | Facility: CLINIC | Age: 37
End: 2017-10-13
Payer: COMMERCIAL

## 2017-10-13 DIAGNOSIS — O09.92 SUPERVISION OF HIGH RISK PREGNANCY, ANTEPARTUM, SECOND TRIMESTER: ICD-10-CM

## 2017-10-13 DIAGNOSIS — O09.522 AMA (ADVANCED MATERNAL AGE) MULTIGRAVIDA 35+, SECOND TRIMESTER: ICD-10-CM

## 2017-10-13 DIAGNOSIS — O34.32 CERVICAL INSUFFICIENCY DURING PREGNANCY, ANTEPARTUM, SECOND TRIMESTER: ICD-10-CM

## 2017-10-13 PROCEDURE — 96372 THER/PROPH/DIAG INJ SC/IM: CPT

## 2017-10-13 PROCEDURE — 99207 ZZC NO CHARGE NURSE ONLY: CPT

## 2017-10-13 NOTE — NURSING NOTE
>> VIANEY NASCIMENTO   Fri Oct 13, 2017 11:51 AM   The following medication was given:     MEDICATION: Lakshmi  ROUTE: IM  SITE: RUQ - Gluteus  DOSE: 250mg/ml  LOT #: [id number]  :  [employer]  EXPIRATION DATE:  11/2019  NDC#: 63838-623-42  Medication taken from clinic stock.  Patient tolerated the injection well.  She continues to mention the site of injection itches.  She discussed this with Dr. Norwood yesterday. This is expected. She can take Benadryl or apply a warm pack  or a cold pack to the area as needed.  She is scheduled 10/18/2017 with Shavon Luo with endocrinology at .  Patient understands her 1 hour gtt is elevated.  The 3 hour is not recommended.  She needs to go directly to the specialist.  Next [place] injection is scheduled 10/20/2017.  Vianey Nascimento RN

## 2017-10-13 NOTE — TELEPHONE ENCOUNTER
I met with patient in clinic today.  She is scheduled with Shavon Luo at  endocrinology on 10/18/2017.  I cancelled the Nasima Alvarado appointment on 10/16/2017.  Patient was here with an .  She understands the elevated 1 hour gtt results and the need for close follow up with specialty.  Vianey Perry RN

## 2017-10-13 NOTE — MR AVS SNAPSHOT
After Visit Summary   10/13/2017    Reji Lockwood    MRN: [id number]           Patient Information     Date Of Birth          1980        Visit Information        Provider Department      10/13/2017 10:45 AM [facility] [employer]; NURSE ONLY WOMENS [facility]        Today's Diagnoses     Cervical insufficiency during pregnancy, antepartum, second trimester        Supervision of high risk pregnancy, antepartum, second trimester        AMA (advanced maternal age) multigravida 35+, second trimester           Follow-ups after your visit        Your next 10 appointments already scheduled     Oct 18, 2017  1:00 PM CDT   New Visit with Mg Cde Provider   [facility]    [address]  [facility] [address]   [phone]            Oct 20, 2017 11:00 AM CDT   [facility]   [facility] ([facility])    [address]  [facility] [address]   [phone]            Oct 26, 2017  3:00 PM CDT   Return Visit with Breana Hood MD   [facility]    [address]  [facility] [address]   [phone]            Oct 27, 2017 11:00 AM CDT   Nurse Only with NURSE ONLY WOMENS   [facility] ([facility])    [address]  [facility] [address]   [phone]            Nov 03, 2017 11:00 AM CDT   Nurse Only with NURSE ONLY WOMENS   [facility] ([facility])    [address]  [facility] [address]   [phone]            Nov 03, 2017  2:45 PM CDT   ESTABLISHED PRENATAL with Main Norwood MD   [facility]    [address] [place] [id number] [phone]              Who to contact     If you have questions or need follow up  "information about today's clinic visit or your schedule please contact [facility] directly at [phone].  Normal or non-critical lab and imaging results will be communicated to you by [employer] IMShart, letter or phone within 4 business days after the clinic has received the results. If you do not hear from us within 7 days, please contact the clinic through [employer] IM[employer]art or phone. If you have a critical or abnormal lab result, we will notify you by phone as soon as possible.  Submit refill requests through rVue or call your pharmacy and they will forward the refill request to us. Please allow 3 business days for your refill to be completed.          Additional Information About Your Visit        MyChart Information     rVue lets you send messages to your doctor, view your test results, renew your prescriptions, schedule appointments and more. To sign up, go to [email or web address] . Click on \"Log in\" on the left side of the screen, which will take you to the Welcome page. Then click on \"Sign up Now\" on the right side of the page.     You will be asked to enter the access code listed below, as well as some personal information. Please follow the directions to create your username and password.     Your access code is: [email or web address]  Expires: 12/10/2017  3:39 PM     Your access code will  in 90 days. If you need help or a new code, please call your [facility] clinic or [phone].        Care EveryWhere ID     This is your Care EveryWhere ID. This could be used by other organizations to access your [facility] medical records  [id number]        Your Vitals Were     Last Period                   2017 (Within Weeks)            Blood Pressure from Last 3 Encounters:   10/11/17 116/71   10/06/17 104/67   17 104/66    Weight from Last 3 Encounters:   10/11/17 64.4 kg (142 lb)   17 62.6 kg (138 lb)   17 60.8 kg (134 lb)              We Performed the Following     " HYDROXYPROGESTERONE      INJECTION INTRAMUSCULAR OR SUB-Q        Primary Care Provider Office Phone # Fax Bull Ho Ubaldo Serrano [phone] [address] [place]  [place] [facility] [address]        Equal Access to Services     [facility] ANNY : Hadii aad ku hadasho Soomaali, waaxda luqadaha, qaybta kaalmada adeegyada, waxay bekain hayaan wilbert mayamelybob daily . So [facility] [phone].    ATENCIÓN: Si habla español, tiene a gilliam disposición servicios gratuitos de asistencia lingüística. Deborah al [phone].    We comply with applicable federal civil rights laws and [place] laws. We do not discriminate on the basis of race, color, national origin, age, disability, sex, sexual orientation, or gender identity.            Thank you!     Thank you for choosing [facility]  for your care. Our goal is always to provide you with excellent care. Hearing back from our patients is one way we can continue to improve our services. Please take a few minutes to complete the written survey that you may receive in the mail after your visit with us. Thank you!             Your Updated Medication List - Protect others around you: Learn how to safely use, store and throw away your medicines at www.disposemymeds.org.          This list is accurate as of: 10/13/17 11:55 AM.  Always use your most recent med list.                   Brand Name Dispense Instructions for use Diagnosis    * acetaminophen 500 MG tablet    TYLENOL     Take 500-1,000 mg by mouth        * acetaminophen 325 MG tablet    TYLENOL    100 tablet    Take 2 tablets (650 mg) by mouth every 4 hours as needed for mild pain or fever    Cervical insufficiency during pregnancy, antepartum, second trimester       docusate sodium 100 MG tablet    COLACE    60 tablet    Take 100 mg by mouth daily    Cervical insufficiency during pregnancy, antepartum, second trimester       hydroxyprogesterone caproate in oil 250 mg/mL  intramuscular injection    LYDIA    5 mL    Inject 1 mL (250 mg) into the muscle every 7 days    Cervical insufficiency during pregnancy, antepartum, second trimester, Supervision of high risk pregnancy, antepartum, second trimester, History of  delivery, currently pregnant in second trimester       ondansetron 4 MG ODT tab    ZOFRAN ODT    30 tablet    Take 1-2 tablets (4-8 mg) by mouth every 8 hours as needed for nausea    Nausea/vomiting in pregnancy       prenatal multivitamin plus iron 27-0.8 MG Tabs per tablet     100 tablet    Take 1 tablet by mouth daily    Pregnancy test positive       * Notice:  This list has 2 medication(s) that are the same as other medications prescribed for you. Read the directions carefully, and ask your doctor or other care provider to review them with you.

## 2017-10-18 ENCOUNTER — MEDICAL CORRESPONDENCE (OUTPATIENT)
Dept: HEALTH INFORMATION MANAGEMENT | Facility: CLINIC | Age: 37
End: 2017-10-18

## 2017-10-18 ENCOUNTER — OFFICE VISIT (OUTPATIENT)
Dept: NURSING | Facility: CLINIC | Age: 37
End: 2017-10-18
Payer: COMMERCIAL

## 2017-10-18 ENCOUNTER — TELEPHONE (OUTPATIENT)
Dept: ENDOCRINOLOGY | Facility: CLINIC | Age: 37
End: 2017-10-18

## 2017-10-18 DIAGNOSIS — O99.810 ABNORMAL MATERNAL GLUCOSE TOLERANCE, ANTEPARTUM: Primary | ICD-10-CM

## 2017-10-18 DIAGNOSIS — E11.9 DIABETES MELLITUS WITHOUT COMPLICATION (H): Primary | ICD-10-CM

## 2017-10-18 LAB — HBA1C MFR BLD: 5.6 % (ref 0–5.7)

## 2017-10-18 PROCEDURE — G0108 DIAB MANAGE TRN  PER INDIV: HCPCS

## 2017-10-18 NOTE — MR AVS SNAPSHOT
After Visit Summary   10/18/2017    Reji Lockwood    MRN: [id number]           Patient Information     Date Of Birth          1980        Visit Information        Provider Department      10/18/2017 12:45 PM Provider, Mg Benavidez; [facility]        Today's Diagnoses     gestational diabetes    -  1       Follow-ups after your visit        Your next 10 appointments already scheduled     Oct 23, 2017  2:00 PM CDT   Telephone Visit with Mg Cde Provider   [facility])    [address] [facility] [address]   [phone]           Note: this is not an onsite visit; there is no need to come to the facility.            Oct 25, 2017  1:00 PM CDT   Return Visit with Mg Darone Provider   [facility] [place]    [address] [facility] [address]   [phone]            Oct 26, 2017  3:00 PM CDT   Return Visit with Breana Hood MD   [facility] [place])    [address] [facility] [address]   [phone]            Oct 27, 2017 11:00 AM CDT   Nurse Only with NURSE ONLY WOMENS   [facility]    [address]  [facility] [address]   [phone]            Nov 03, 2017 11:00 AM CDT   Nurse Only with NURSE ONLY WOMENS   [facility] ([facility])    [address]  [facility] [address]   [phone]            Nov 03, 2017  2:45 PM CDT   ESTABLISHED PRENATAL with Main Norwood MD   [facility] ([facility])    [address] [facility] [address]   [phone]            Nov 07, 2017  1:00 PM CST   New Visit with Autumn Urban MD   [facility] [place])    [address] [place] [address]  [place] [id number] [phone]              Who to contact     If you have questions or need follow up information about today's clinic visit or your schedule please contact [facility] directly at [phone].  Normal or non-critical lab and imaging results will be communicated to you by MyChart, letter or phone within 4 business days after the clinic has received the results. If you do not hear from us within 7 days, please contact the clinic through MyChart or phone. If you have a critical or abnormal lab result, we will notify you by phone as soon as possible.  Submit refill requests through [email or web address] or call your pharmacy and they will forward the refill request to us. Please allow 3 business days for your refill to be completed.          Additional Information About Your Visit        [email or web address] Information     [email or web address] is an electronic gateway that provides easy, online access to your medical records. With [email or web address], you can request a clinic appointment, read your test results, renew a prescription or communicate with your care team.     To sign up for [email or web address] visit the website at [email or web address]   You will be asked to enter the access code listed below, as well as some personal information. Please follow the directions to create your username and password.     Your access code is: [email or web address]  Expires: 12/10/2017  3:39 PM     Your access code will  in 90 days. If you need help or a new code, please contact your [facility] or call [phone] for assistance.        Care EveryWhere ID     This is your Care EveryWhere ID. This could be used by other organizations to access your [facility] medical records  [id number]        Your Vitals Were     Last Period                   2017 (Within Weeks)            Blood Pressure from Last 3 Encounters:   10/11/17 116/71   10/06/17 104/67   17 104/66    Weight from Last 3 Encounters:   10/11/17 64.4 kg  (142 lb)   09/13/17 62.6 kg (138 lb)   09/11/17 60.8 kg (134 lb)              We Performed the Following     DIABETES EDUCATION - Individual  []          Today's Medication Changes          These changes are accurate as of: 10/18/17 11:59 PM.  If you have any questions, ask your nurse or doctor.               Start taking these medicines.        Dose/Directions    acetone (Urine) test Strp   Used for:  Abnormal maternal glucose tolerance, antepartum   Started by:  Shavon Luo        Use one strip daily to check for urine ketones   Quantity:  25 each   Refills:  1       blood glucose monitoring lancets   Used for:  Abnormal maternal glucose tolerance, antepartum   Started by:  Shavon Luo        Use to test blood sugar 4 times daily or as directed.   Quantity:  100 each   Refills:  3       blood glucose monitoring test strip   Commonly known as:  no brand specified   Used for:  Abnormal maternal glucose tolerance, antepartum   Started by:  Shavon Luo        Use to test blood sugars 4 times daily or as directed   Quantity:  200 strip   Refills:  3            Where to get your medicines      These medications were sent to [facility] [address] [facility] [address] [facility] [address] [facility]  [address] [facility] [address]     Phone:  [phone]     acetone (Urine) test Strp    blood glucose monitoring lancets    blood glucose monitoring test strip                Primary Care Provider Office Phone # Fax #    Fadia Weberche Serrano [phone]       [address]  [facility] [address]        Equal Access to Services     [facility]: Hadii aad ku hadasho Soomaali, waaxda luqadaha, qaybta kaalmada adeegyada, ana salvador. So [facility] [phone].    ATENCIÓN: Si habla español, tiene a gilliam disposición servicios gratuitos de asistencia lingüística. Llame al [phone].    We comply with applicable  federal civil rights laws and [place] laws. We do not discriminate on the basis of race, color, national origin, age, disability, sex, sexual orientation, or gender identity.            Thank you!     Thank you for choosing [facility]  for your care. Our goal is always to provide you with excellent care. Hearing back from our patients is one way we can continue to improve our services. Please take a few minutes to complete the written survey that you may receive in the mail after your visit with us. Thank you!             Your Updated Medication List - Protect others around you: Learn how to safely use, store and throw away your medicines at www.disposemymeds.org.          This list is accurate as of: 10/18/17 11:59 PM.  Always use your most recent med list.                   Brand Name Dispense Instructions for use Diagnosis    * acetaminophen 500 MG tablet    TYLENOL     Take 500-1,000 mg by mouth        * acetaminophen 325 MG tablet    TYLENOL    100 tablet    Take 2 tablets (650 mg) by mouth every 4 hours as needed for mild pain or fever    Cervical insufficiency during pregnancy, antepartum, second trimester       acetone (Urine) test Strp     25 each    Use one strip daily to check for urine ketones    Abnormal maternal glucose tolerance, antepartum       blood glucose monitoring lancets     100 each    Use to test blood sugar 4 times daily or as directed.    Abnormal maternal glucose tolerance, antepartum       blood glucose monitoring test strip    no brand specified    200 strip    Use to test blood sugars 4 times daily or as directed    Abnormal maternal glucose tolerance, antepartum       docusate sodium 100 MG tablet    COLACE    60 tablet    Take 100 mg by mouth daily    Cervical insufficiency during pregnancy, antepartum, second trimester       hydroxyprogesterone caproate in oil 250 mg/mL intramuscular injection    LYDIA    5 mL    Inject 1 mL (250 mg) into the muscle every 7 days     Cervical insufficiency during pregnancy, antepartum, second trimester, Supervision of high risk pregnancy, antepartum, second trimester, History of  delivery, currently pregnant in second trimester       ondansetron 4 MG ODT tab    ZOFRAN ODT    30 tablet    Take 1-2 tablets (4-8 mg) by mouth every 8 hours as needed for nausea    Nausea/vomiting in pregnancy       prenatal multivitamin plus iron 27-0.8 MG Tabs per tablet     100 tablet    Take 1 tablet by mouth daily    Pregnancy test positive       * Notice:  This list has 2 medication(s) that are the same as other medications prescribed for you. Read the directions carefully, and ask your doctor or other care provider to review them with you.

## 2017-10-20 ENCOUNTER — TELEPHONE (OUTPATIENT)
Dept: ENDOCRINOLOGY | Facility: CLINIC | Age: 37
End: 2017-10-20

## 2017-10-20 ENCOUNTER — ALLIED HEALTH/NURSE VISIT (OUTPATIENT)
Dept: NURSING | Facility: CLINIC | Age: 37
End: 2017-10-20
Payer: COMMERCIAL

## 2017-10-20 DIAGNOSIS — O24.419 GESTATIONAL DIABETES MELLITUS (GDM), ANTEPARTUM, GESTATIONAL DIABETES METHOD OF CONTROL UNSPECIFIED: Primary | ICD-10-CM

## 2017-10-20 DIAGNOSIS — O09.522 AMA (ADVANCED MATERNAL AGE) MULTIGRAVIDA 35+, SECOND TRIMESTER: ICD-10-CM

## 2017-10-20 DIAGNOSIS — O09.92 SUPERVISION OF HIGH RISK PREGNANCY, ANTEPARTUM, SECOND TRIMESTER: ICD-10-CM

## 2017-10-20 DIAGNOSIS — O34.32 CERVICAL INSUFFICIENCY DURING PREGNANCY, ANTEPARTUM, SECOND TRIMESTER: ICD-10-CM

## 2017-10-20 PROCEDURE — 96372 THER/PROPH/DIAG INJ SC/IM: CPT

## 2017-10-20 PROCEDURE — 99207 ZZC NO CHARGE NURSE ONLY: CPT

## 2017-10-20 RX ORDER — BLOOD-GLUCOSE METER
EACH MISCELLANEOUS
Qty: 1 KIT | Refills: 1 | Status: SHIPPED | OUTPATIENT
Start: 2017-10-20 | End: 2018-02-22

## 2017-10-20 NOTE — TELEPHONE ENCOUNTER
Message from pharmacy requesting new prescription for a blood glucose meter. One Touch Ultra 2 is covered through her insurance and patient already has the strips.    Please review for orders.    Kezia Crump CMA

## 2017-10-20 NOTE — TELEPHONE ENCOUNTER
"Left vm on business phone of [employer]:  Services, [phone], requesting , \"Jen V\" be scheduled to interpret for patient's upcoming appts on Oct 25th at 1pm and Nov 7th at 1pm. Please call clinic at [phone] to confirm you have received this message.     Shavon Luo, RN, BSN, CDE   [facility]  "

## 2017-10-20 NOTE — NURSING NOTE
MEDICATION: Hydroxyprogesterone Caproate 250mg/mL in oil   ROUTE: IM  SITE: LUQ - Gluteus  DOSE: 250mg/mL  LOT #: [id number]  :  [employer]  EXPIRATION DATE:  11/2019  NDC#: 70943-025-37  Patient came to clinic accompanied by . Used Lakshmi from clinic stock. Patient tolerated injection without complaints. She denied having any itching after last injection. Reminder her of home care advise per Dr. Norwood. Patient ambulated out of clinic without complaints. Ca Virgen RN, BAN

## 2017-10-20 NOTE — ANESTHESIA POSTPROCEDURE EVALUATION
Patient: Reji Ayalae    Procedure(s):  Cervical Cerclage  - Wound Class: I-Clean    Diagnosis:Incompetent Cervix   Diagnosis Additional Information: No value filed.    Anesthesia Type:  Spinal    Note:  Anesthesia Post Evaluation    Patient location during evaluation: OB PACU  Patient participation: Able to fully participate in evaluation  Level of consciousness: awake  Pain management: adequate  Airway patency: patent  Cardiovascular status: acceptable  Respiratory status: acceptable  Hydration status: acceptable  PONV: none     Anesthetic complications: None          Last vitals:  Vitals:    09/11/17 1457 09/11/17 1533 09/11/17 1700   BP: 108/62 109/57 130/69   Pulse:      Resp: 16 16 18   Temp: 37.1  C (98.8  F)  36.9  C (98.4  F)   SpO2: 100% 97%          Electronically Signed By: Barbara Foss MD  October 19, 2017  9:16 PM

## 2017-10-20 NOTE — MR AVS SNAPSHOT
After Visit Summary   10/20/2017    Reji Lockwood    MRN: [id number]           Patient Information     Date Of Birth          1980        Visit Information        Provider Department      10/20/2017 10:45 AM [facility] [employer]; NURSE ONLY WOMENS [facility]        Today's Diagnoses     Cervical insufficiency during pregnancy, antepartum, second trimester        Supervision of high risk pregnancy, antepartum, second trimester        AMA (advanced maternal age) multigravida 35+, second trimester           Follow-ups after your visit        Your next 10 appointments already scheduled     Oct 23, 2017  2:00 PM CDT   Telephone Visit with Mg Cde Provider   [facility]    [address]  [facility] [address]   [phone]           Note: this is not an onsite visit; there is no need to come to the facility.            Oct 25, 2017  1:00 PM CDT   Return Visit with Mg Cde Provider   [facility] ([facility])    [address]  [facility] [address]   [phone]            Oct 26, 2017  3:00 PM CDT   Return Visit with Breana Hood MD   [facility] ([facility])    [address]  [facility] [address]   [phone]            Oct 27, 2017 11:00 AM CDT   [facility]URSE ONLY [facility]   [facility] ([facility])    [address]  [facility] [address]   [phone]            Nov 03, 2017 11:00 AM CDT   Nurse Only with NURSE ONLY WOMENS   [facility] ([facility])    [address]  [facility] [address]   [phone]            Nov 03, 2017  2:45 PM CDT   ESTABLISHED PRENATAL with Main Norwood MD   [facility] ([facility])    [address] [facility] [address]  "  [phone]            2017  1:00 PM CST   New Visit with [facility]umn [facility]    [address] [place] [address] [phone]              Who to contact     If you have questions or need follow up information about today's clinic visit or your schedule please contact [facility] directly at [phone].  Normal or non-critical lab and imaging results will be communicated to you by MyChart, letter or phone within 4 business days after the clinic has received the results. If you do not hear from us within 7 days, please contact the clinic through proVITALhart or phone. If you have a critical or abnormal lab result, we will notify you by phone as soon as possible.  Submit refill requests through TeraFirrma or call your pharmacy and they will forward the refill request to us. Please allow 3 business days for your refill to be completed.          Additional Information About Your Visit        proVITALhart Information     TeraFirrma lets you send messages to your doctor, view your test results, renew your prescriptions, schedule appointments and more. To sign up, go to [email or web address] . Click on \"Log in\" on the left side of the screen, which will take you to the Welcome page. Then click on \"Sign up Now\" on the right side of the page.     You will be asked to enter the access code listed below, as well as some personal information. Please follow the directions to create your username and password.     Your access code is: [email or web address]  Expires: 12/10/2017  3:39 PM     Your access code will  in 90 days. If you need help or a new code, please call your [facility] clinic or [phone].        Care EveryWhere ID     This is your Care EveryWhere ID. This could be used by other organizations to access your [facility] medical records  [id number]        Your Vitals Were     Last Period                   2017 " (Within Weeks)            Blood Pressure from Last 3 Encounters:   10/11/17 116/71   10/06/17 104/67   09/29/17 104/66    Weight from Last 3 Encounters:   10/11/17 64.4 kg (142 lb)   09/13/17 62.6 kg (138 lb)   09/11/17 60.8 kg (134 lb)              We Performed the Following     HYDROXYPROGESTERONE      INJECTION INTRAMUSCULAR OR SUB-Q          Today's Medication Changes          These changes are accurate as of: 10/20/17 12:02 PM.  If you have any questions, ask your nurse or doctor.               Start taking these medicines.        Dose/Directions    blood glucose monitoring meter device kit   Used for:  Gestational diabetes mellitus (GDM), antepartum, gestational diabetes method of control unspecified   Started by:  Shavon Luo        Use to test blood sugar 4 times daily or as directed.   Quantity:  1 kit   Refills:  1            Where to get your medicines      These medications were sent to [facility] [address] [facility] [address] [place] [address] [address]  [address] [facility] [address]     Phone:  [phone]     blood glucose monitoring meter device kit                Primary Care Provider Office Phone # Fax #    Fadia Weberche Serrano [phone]       [address] [facility] [address]        Equal Access to Services     [facility]: Hadii aad ku hadasho Soomaali, waaxda luqadaha, qaybta kaalmada adeegyada, ana wesley hayyanira daily . So [facility] [phone].    ATENCIÓN: Si habla español, tiene a gilliam disposición servicios gratuitos de asistencia lingüística. Llame al [phone].    We comply with applicable federal civil rights laws and [place] laws. We do not discriminate on the basis of race, color, national origin, age, disability, sex, sexual orientation, or gender identity.            Thank you!     Thank you for choosing [facility]  for your care. Our goal is  always to provide you with excellent care. Hearing back from our patients is one way we can continue to improve our services. Please take a few minutes to complete the written survey that you may receive in the mail after your visit with us. Thank you!             Your Updated Medication List - Protect others around you: Learn how to safely use, store and throw away your medicines at www.disposemymeds.org.          This list is accurate as of: 10/20/17 12:02 PM.  Always use your most recent med list.                   Brand Name Dispense Instructions for use Diagnosis    * acetaminophen 500 MG tablet    TYLENOL     Take 500-1,000 mg by mouth        * acetaminophen 325 MG tablet    TYLENOL    100 tablet    Take 2 tablets (650 mg) by mouth every 4 hours as needed for mild pain or fever    Cervical insufficiency during pregnancy, antepartum, second trimester       acetone (Urine) test Strp     25 each    Use one strip daily to check for urine ketones    Abnormal maternal glucose tolerance, antepartum       blood glucose monitoring lancets     100 each    Use to test blood sugar 4 times daily or as directed.    Abnormal maternal glucose tolerance, antepartum       blood glucose monitoring meter device kit     1 kit    Use to test blood sugar 4 times daily or as directed.    Gestational diabetes mellitus (GDM), antepartum, gestational diabetes method of control unspecified       blood glucose monitoring test strip    no brand specified    200 strip    Use to test blood sugars 4 times daily or as directed    Abnormal maternal glucose tolerance, antepartum       docusate sodium 100 MG tablet    COLACE    60 tablet    Take 100 mg by mouth daily    Cervical insufficiency during pregnancy, antepartum, second trimester       hydroxyprogesterone caproate in oil 250 mg/mL intramuscular injection    LYDIA    5 mL    Inject 1 mL (250 mg) into the muscle every 7 days    Cervical insufficiency during pregnancy, antepartum, second  trimester, Supervision of high risk pregnancy, antepartum, second trimester, History of  delivery, currently pregnant in second trimester       ondansetron 4 MG ODT tab    ZOFRAN ODT    30 tablet    Take 1-2 tablets (4-8 mg) by mouth every 8 hours as needed for nausea    Nausea/vomiting in pregnancy       prenatal multivitamin plus iron 27-0.8 MG Tabs per tablet     100 tablet    Take 1 tablet by mouth daily    Pregnancy test positive       * Notice:  This list has 2 medication(s) that are the same as other medications prescribed for you. Read the directions carefully, and ask your doctor or other care provider to review them with you.

## 2017-10-20 NOTE — PROGRESS NOTES
"  Diabetes Self-Management Training - Gestational Diabetes    SUBJECTIVE/OBJECTIVE:  Reji Lockwood presents today for education related to gestational diabetes.    She is accompanied by self. Patient is of  descent. , \"Jen JOSÉ\" present. Works for  Service: [employer].     Patient's gestational diabetes management related comments/concerns: none    Patient's emotional response to diabetes: expresses readiness to learn.    LMP: 2017  JESSICA: 01/15/18  Weight: 128 lbs 17   Weight: 145 lbs 10/18/17 at 28 weeks gestation.      Lab Results   Component Value Date    GLC 90 10/09/2017     1 hour OGTT: 274  A1c Poct done today in clinic: 5.6%    History   Smoking Status     Never Smoker   Smokeless Tobacco     Never Used       Lifestyle and Health Behaviors:    Nutrition:  Patient eats 2-3 meals. Wakes up around 8am. Goes to bed around 10pm.     Breakfast: rarely eats but if does: cereal with milk or 1 slice toast with butter or cream cheese, juice  Snack:  rarely  Lunch: rice with meat and vegetable, juice  Snack: rarely  Dinner: rice or noodles with meat and vegetables, juice  Bedtime Snack: rarely    Other time(s) food is eaten? Sometimes will eat fruit or noodles mid-meal. Ltely has been eating a popsicle mid-afternoon.     Beverages: water, juice, milk. No alcohol. No coffee.     Cultural/Taoism diet restrictions: No     Biggest challenge to healthy eating is:  none    Pre-Radha Vitamin: Yes    Supplements: No   Experiencing nausea?  No     Socio/Economic considerations:  Support System: spouse/significant other    Health Beliefs and Attitudes:   Stage of Change: CONTEMPLATION (Considering change and yet undecided)    ASSESSMENT:  37 year old.  woman, G2, P1. Multiple concerns with pregnancy. Positive for Hep B. Cervical insufficiency with first pregnancy. Spontaneous vaginal delivery at 23 weeks - membranes ruptured. Child now 7 years old. Has cognitive and motor delays. Cercival " cerclage, this pregnancy, on 09/11. Language barrier. Patient had been working in a factory on assembly line. Stopped working per MD's recommendation in Sept. Plans to return to work six weeks after baby is born. Plans to [facility] at [facility].     INTERVENTION:  Patient was instructed on Contour Next  meter and was able to provide an accurate return demonstration. Patient's blood glucose reading today was 190, then 178, 2 hours post meal.     Educational topics covered today:  GDM diagnosis, pathophysiology, Risks and Complications of GDM, Means of controlling GDM, Using a Blood Glucose Monitor, Blood Glucose Goals, Logging and Interpreting Glucose Results, Ketone Testing, When to Call a Diabetes Educator or OB Provider, Healthy Eating During Pregnancy, and Physical Activity    Educational materials provided today:   Jackie Understanding Gestational Diabetes  GDM Log Book  Sharps Disposal    Pt verbalized understanding of concepts discussed and recommendations provided today.     PLAN:  Check glucose 4 times daily, before breakfast and 1 hour after each meal.     Check Ketones daily for one week, if negative, reduce testing to once a week.     Physical activity recommended: continue to walk daily.    Meal plan:  Breakfast: do not skip. Eat at least a slice of toast or a bowl of cereal.  Lunch & Dinner: continue same meal plan  Snacks: Limit popsicle to every other day  Beverages: stop drinking juice. Drink water or milk with meals.     Call if 3 or more blood sugars are above the goal in 1 week or if ketones are positive.     FOLLOW-UP:  Appt made to see cde on 10/25, Endo on 11/07. Cde will call pt on 10/23.     Time Spent:75 minutes  Encounter type: Individual    Shavon Luo, RN, BSN, CDE   [facility]

## 2017-10-23 ENCOUNTER — TELEPHONE (OUTPATIENT)
Dept: ENDOCRINOLOGY | Facility: CLINIC | Age: 37
End: 2017-10-23

## 2017-10-23 DIAGNOSIS — O24.419 GESTATIONAL DIABETES MELLITUS (GDM), ANTEPARTUM, GESTATIONAL DIABETES METHOD OF CONTROL UNSPECIFIED: Primary | ICD-10-CM

## 2017-10-23 PROCEDURE — 83036 HEMOGLOBIN GLYCOSYLATED A1C: CPT | Performed by: INTERNAL MEDICINE

## 2017-10-23 PROCEDURE — 36415 COLL VENOUS BLD VENIPUNCTURE: CPT | Performed by: INTERNAL MEDICINE

## 2017-10-25 ENCOUNTER — OFFICE VISIT (OUTPATIENT)
Dept: NURSING | Facility: CLINIC | Age: 37
End: 2017-10-25
Payer: COMMERCIAL

## 2017-10-25 ENCOUNTER — TELEPHONE (OUTPATIENT)
Dept: ENDOCRINOLOGY | Facility: CLINIC | Age: 37
End: 2017-10-25

## 2017-10-25 DIAGNOSIS — O24.419 GESTATIONAL DIABETES MELLITUS: Primary | ICD-10-CM

## 2017-10-25 DIAGNOSIS — E11.9 DIABETES MELLITUS WITHOUT COMPLICATION (H): Primary | ICD-10-CM

## 2017-10-25 PROCEDURE — G0108 DIAB MANAGE TRN  PER INDIV: HCPCS

## 2017-10-25 NOTE — MR AVS SNAPSHOT
After Visit Summary   10/25/2017    Reji Lockwood    MRN: [id number]           Patient Information     Date Of Birth          1980        Visit Information        Provider Department      10/25/2017 12:45 PM Provider, Mg Benavidez; [employer] [facility]        Care Instructions    1. Start Novolin N insulin on Thursday, Oct 26th. Take 5 units before breakfast and 5 units before dinner.   2. Continue to check blood sugar before breakfast and one hour after breakfast, lunch and dinner.  3. Keep unopened bottles of insulin in the refrigerator. Once open, bottles can stay out of the refrigerator.   4. Change bottle of insulin every 6 weeks. Next bottle will need to be opened on Dec 7th.  5. Inject insulin into the side of your belly.   6. Use a new syringe each time.     Shavon Luo, RN, BSN, CDE   [facility]          Follow-ups after your visit        Your next 10 appointments already scheduled     Oct 26, 2017  2:45 PM CDT   Return Visit with Breana Hood [facility]   [facility]    [address]  [facility] [address]   [phone]            Oct 27, 2017 10:45 AM CDT   [facility]    [address] [facility] [address]  [facility] [address]   [phone]            Oct 30, 2017  4:00 PM CDT   Telephone Visit with Mg Benavidez Provider   [facility]    [address]  [facility] [address]   [phone]           Note: this is not an onsite visit; there is no need to come to the facility.            Nov 03, 2017 11:00 AM CDT   [facility]URSE [facility]   [facility]    [address] [facility] [address]  [facility] [address]   [phone]            Nov 03, 2017  2:45 PM CDT   ESTABLISHED PRENATAL with  Main Norwood MD   [facility]    [address] [facility] [id number] [phone]            2017  1:00 PM CST   New Visit with Autumn Urban MD   [facility]    [address] [place] [address] [phone]              Who to contact     If you have questions or need follow up information about today's clinic visit or your schedule please contact [facility] directly at [phone].  Normal or non-critical lab and imaging results will be communicated to you by PacketVideohart, letter or phone within 4 business days after the clinic has received the results. If you do not hear from us within 7 days, please contact the clinic through PacketVideohart or phone. If you have a critical or abnormal lab result, we will notify you by phone as soon as possible.  Submit refill requests through StrongView or call your pharmacy and they will forward the refill request to us. Please allow 3 business days for your refill to be completed.          Additional Information About Your Visit        StrongView Information     StrongView is an electronic gateway that provides easy, online access to your medical records. With StrongView, you can request a clinic appointment, read your test results, renew a prescription or communicate with your care team.     To sign up for StrongView visit the website at [email or web address]   You will be asked to enter the access code listed below, as well as some personal information. Please follow the directions to create your username and password.     Your access code is: [email or web address]  Expires: 12/10/2017  3:39 PM     Your access code will  in 90 days. If you need help or a new code, please contact your [facility] or call [phone] for assistance.        Care EveryWhere ID     This is your Care EveryWhere ID.  "This could be used by other organizations to access your [facility] medical records  [id number]        Your Vitals Were     Last Period                   03/28/2017 (Within Weeks)            Blood Pressure from Last 3 Encounters:   10/11/17 116/71   10/06/17 104/67   09/29/17 104/66    Weight from Last 3 Encounters:   10/11/17 64.4 kg (142 lb)   09/13/17 62.6 kg (138 lb)   09/11/17 60.8 kg (134 lb)              Today, you had the following     No orders found for display         Today's Medication Changes          These changes are accurate as of: 10/25/17  1:51 PM.  If you have any questions, ask your nurse or doctor.               Start taking these medicines.        Dose/Directions    insulin  UNIT/ML injection   Commonly known as:  NovoLIN N VIAL   Used for:  Gestational diabetes mellitus   Started by:  Shavon Luo        5 units before breakfast and 5 units before dinner   Quantity:  10 mL   Refills:  3       insulin syringe-needle U-100 31G X 15/64\" 0.3 ML   Used for:  Gestational diabetes mellitus   Started by:  Shavon Luo        Use 2 syringes daily or as directed.   Quantity:  100 each   Refills:  1         These medicines have changed or have updated prescriptions.        Dose/Directions    * blood glucose monitoring lancets   This may have changed:  Another medication with the same name was added. Make sure you understand how and when to take each.   Used for:  Abnormal maternal glucose tolerance, antepartum   Changed by:  Shavon Luo        Use to test blood sugar 4 times daily or as directed.   Quantity:  100 each   Refills:  3       * blood glucose monitoring lancets   This may have changed:  You were already taking a medication with the same name, and this prescription was added. Make sure you understand how and when to take each.   Used for:  Gestational diabetes mellitus   Changed by:  Shavon Luo        Use to test blood sugar 4 times daily or as directed.   Quantity:  100 each   Refills:  " "1       * Notice:  This list has 2 medication(s) that are the same as other medications prescribed for you. Read the directions carefully, and ask your doctor or other care provider to review them with you.         Where to get your medicines      These medications were sent to [id number] - [facility], [place] - [address] [facility] [address]  [address] [facility] [facility] [address]     Phone:  [phone]     blood glucose monitoring lancets    insulin  UNIT/ML injection    insulin syringe-needle U-100 31G X 15/64\" 0.3 ML                Primary Care Provider Office Phone # Fax #    Fadia Weberche Serrano [phone]       [address]  [facility] [address]        Equal Access to Services     [facility]: Hadii aad ku hadasho Soomaali, waaxda luqadaha, qaybta kaalmada adeegyada, waxay idiin hayaan adecarlota daily . So [facility] [phone].    ATENCIÓN: Si habla español, tiene a gilliam disposición servicios gratuitos de asistencia lingüística. Deborah al [phone].    We comply with applicable federal civil rights laws and [place] laws. We do not discriminate on the basis of race, color, national origin, age, disability, sex, sexual orientation, or gender identity.            Thank you!     Thank you for choosing [facility]  for your care. Our goal is always to provide you with excellent care. Hearing back from our patients is one way we can continue to improve our services. Please take a few minutes to complete the written survey that you may receive in the mail after your visit with us. Thank you!             Your Updated Medication List - Protect others around you: Learn how to safely use, store and throw away your medicines at www.disposemymeds.org.          This list is accurate as of: 10/25/17  1:51 PM.  Always use your most recent med list.                   Brand Name Dispense Instructions for " "use Diagnosis    * acetaminophen 500 MG tablet    TYLENOL     Take 500-1,000 mg by mouth        * acetaminophen 325 MG tablet    TYLENOL    100 tablet    Take 2 tablets (650 mg) by mouth every 4 hours as needed for mild pain or fever    Cervical insufficiency during pregnancy, antepartum, second trimester       acetone (Urine) test Strp     25 each    Use one strip daily to check for urine ketones    Abnormal maternal glucose tolerance, antepartum       * blood glucose monitoring lancets     100 each    Use to test blood sugar 4 times daily or as directed.    Abnormal maternal glucose tolerance, antepartum       * blood glucose monitoring lancets     100 each    Use to test blood sugar 4 times daily or as directed.    Gestational diabetes mellitus       blood glucose monitoring meter device kit     1 kit    Use to test blood sugar 4 times daily or as directed.    Gestational diabetes mellitus (GDM), antepartum, gestational diabetes method of control unspecified       blood glucose monitoring test strip    no brand specified    200 strip    Use to test blood sugars 4 times daily or as directed    Abnormal maternal glucose tolerance, antepartum       docusate sodium 100 MG tablet    COLACE    60 tablet    Take 100 mg by mouth daily    Cervical insufficiency during pregnancy, antepartum, second trimester       hydroxyprogesterone caproate in oil 250 mg/mL intramuscular injection    LYDIA    5 mL    Inject 1 mL (250 mg) into the muscle every 7 days    Cervical insufficiency during pregnancy, antepartum, second trimester, Supervision of high risk pregnancy, antepartum, second trimester, History of  delivery, currently pregnant in second trimester       insulin  UNIT/ML injection    NovoLIN N VIAL    10 mL    5 units before breakfast and 5 units before dinner    Gestational diabetes mellitus       insulin syringe-needle U-100 31G X 15/64\" 0.3 ML     100 each    Use 2 syringes daily or as directed.    " Gestational diabetes mellitus       ondansetron 4 MG ODT tab    ZOFRAN ODT    30 tablet    Take 1-2 tablets (4-8 mg) by mouth every 8 hours as needed for nausea    Nausea/vomiting in pregnancy       prenatal multivitamin plus iron 27-0.8 MG Tabs per tablet     100 tablet    Take 1 tablet by mouth daily    Pregnancy test positive       * Notice:  This list has 4 medication(s) that are the same as other medications prescribed for you. Read the directions carefully, and ask your doctor or other care provider to review them with you.

## 2017-10-25 NOTE — TELEPHONE ENCOUNTER
Reji returned Shavon's call.  Message below was communicated.  Reji will try to be to the clinic by 4:30pm today.  Thank you.

## 2017-10-25 NOTE — PROGRESS NOTES
Gestational Diabetes Follow-up Visit    SUBJECTIVE/OBJECTIVE:  Reji Lockwood presents today for education and evaluation of glucose control and insulin administration instruction related to gestational diabetes    She is accompanied by self    Patient's gestational diabetes management related comments/concerns: concern for starting insulin during pregnancy and whether or not she will need to continue taking insulin after baby is born.     LMP 03/28/2017 (Within Weeks)    JESSICA: 01/15/18. Currently 29 weeks gestation    Current gestational diabetes management:    Taking medications for gestational diabetes? No    ASSESSMENT:  37 year old, G2, P1, who has devloped gestational diabetes and is now in need of insulin. Discussed bg levels with Dr Castaneda, who the patient is scheduled to see on Nov 7th, and a decision was made, based on rising bg levels, to start the patient on nph insulin, 5 units before breakfast and 5 units before dinner. Pt verbalizes she will  Rx today and start med tomorrow.     INTERVENTION:  Educational topics covered today:  Insulin administration, storage and dosage. Safe disposal of sharps.  Patient taught how to inject insulin. Patient did a return demonstration and was able draw insulin into syringe, measure correct dose and administer into an injection pillow using proper technique.   Proper disposal of sharps reviewed.  Proper storage of nph insulin discussed.     Educational Materials provided today:  No new material provided today.    PLAN:  Check glucose 4 times daily; fasting and 1 hour post meal.   Take insulin daily: 5 units before breakfast and 5 units before dinner.   Cde to call patient on Monday for a bg report.     FOLLOW-UP:  Follow up with diabetes educator in Dec.  Meet with Endo in Nov.    Call educator if any questions or concerns arise.      Time spent was 30 minutes  Encounter type: Individual    Shavon Luo, RN, BSN, CDE   [facility]

## 2017-10-25 NOTE — PATIENT INSTRUCTIONS
1. Start Novolin N insulin on Thursday, Oct 26th. Take 5 units before breakfast and 5 units before dinner.   2. Continue to check blood sugar before breakfast and one hour after breakfast, lunch and dinner.  3. Keep unopened bottles of insulin in the refrigerator. Once open, bottles can stay out of the refrigerator.   4. Change bottle of insulin every 6 weeks. Next bottle will need to be opened on Dec 7th.  5. Inject insulin into the side of your belly.   6. Use a new syringe each time.     Shavon Luo, RN, BSN, CDE   [facility]

## 2017-10-25 NOTE — TELEPHONE ENCOUNTER
Left patient a vm advising her insulin prescrition (Novolin N) sent to the pharmacy was not covered by her insurance. Had to change to a different insulin (Humulin N) which comes in a pen, not a vial. Please call clinic. Would like you to return to the clinic today before 5pm to learn how to give yourself an injection using the pen instead of a vial and syringe.     Shavon Luo, RN, BSN, CDE   [facility]

## 2017-10-25 NOTE — NURSING NOTE
Received a call from pharmacy stating Novolin N (only comes in vial) was not covered under pt's insurance. Pt switched pt to Humulin N (only comes in pen). Pt returned to clinic at 4:35pm today to learn injection technique using a pen device. Reviewed and pt did a return demonstration. Also reviewed storage of this med. Advised: need to change out pen every 2 weeks. Unopened keep in fridge. Once opened, keep at room temp. Dispose of pen needles in plastic container. Change pen needle each time. Prime pen with one unit each time and waste, then dial up dose. Pt verbalized understanding of all the above.     Shavon Luo, RN, BSN, CDE   [facility]

## 2017-10-26 ENCOUNTER — OFFICE VISIT (OUTPATIENT)
Dept: GASTROENTEROLOGY | Facility: CLINIC | Age: 37
End: 2017-10-26
Payer: COMMERCIAL

## 2017-10-26 VITALS
DIASTOLIC BLOOD PRESSURE: 66 MMHG | HEART RATE: 79 BPM | WEIGHT: 143.4 LBS | BODY MASS INDEX: 28.91 KG/M2 | SYSTOLIC BLOOD PRESSURE: 106 MMHG | HEIGHT: 59 IN

## 2017-10-26 DIAGNOSIS — B18.1 VIRAL HEPATITIS B CHRONIC (H): Primary | ICD-10-CM

## 2017-10-26 PROCEDURE — 99213 OFFICE O/P EST LOW 20 MIN: CPT | Performed by: INTERNAL MEDICINE

## 2017-10-26 RX ORDER — TENOFOVIR DISOPROXIL FUMARATE 300 MG/1
300 TABLET, FILM COATED ORAL DAILY
Qty: 90 TABLET | Refills: 3 | Status: SHIPPED | OUTPATIENT
Start: 2017-10-26 | End: 2018-03-08

## 2017-10-26 ASSESSMENT — PAIN SCALES - GENERAL: PAINLEVEL: NO PAIN (0)

## 2017-10-26 NOTE — PATIENT INSTRUCTIONS
Starting Tenofovir now, then 3 weeks check labs (around 11/16/17)  - if all is stable, then continue treatment until we meet again in March or April with labs

## 2017-10-26 NOTE — PROGRESS NOTES
Hepatology Clinic note  Reji Lockwood   Date of Birth 1980  Date of Service 10/26/2017         Impression/plan:   Reji Lockwood is a 37 year old female without significant PMH except for chronic HBV infection without signs of advanced liver disease.   She's 28 weeks pregnant and she presents for follow up. Expected delivery date 1/15/2018.    No evidence of advanced liver and has normal liver enzymes.   Viral load is > 170,000,000 IU/ml, carrying a higher risk of transmission to the infant.   - Starting Tenofovir now, then 3 weeks will check labs, if all is stable, then continue treatment until we meet again in March or April.   - will consider withdrawal of the medicine perhaps in 6 months after delivery while we monitor viral levels.   - she's aware that her infant should receive HBV vaccine and immunoglobulins at time of birth.     RTC in 5-6 months or sooner as needed    Breana Hood MD  [facility]    -----------------------------------------------------       HPI:   Reji Lockwood is a 37 year old female without significant PMH except for chronic HBV infection without signs of advanced liver disease.   She's 28 weeks pregnant and she presents for follow up.     Please refer to prior clinic note for details of initial presentation.     Notes to be doing well without new problems. Her pregnancy is progressing well and as expected.   Otherwise, denies chest pain, shortness of breath, abdominal pain, nausea, vomiting fevers, chills, bleeding, jaundice, confusion, falls, rash, pruritis.         Past Medical History:     Past Medical History:   Diagnosis Date     Chronic viral hepatitis B without delta agent and without coma (H) 2010            Past Surgical History:     Past Surgical History:   Procedure Laterality Date     CERCLAGE CERVICAL N/A 9/11/2017    Procedure: CERCLAGE CERVICAL;  Cervical Cerclage ;  Surgeon: Ez Erazo MD;  Location: [facility]            Medications:  "    Current Outpatient Prescriptions   Medication     insulin NPH (NOVOLIN N VIAL) 100 UNIT/ML injection     insulin syringe-needle U-100 31G X 15/64\" 0.3 ML     blood glucose monitoring (ONE TOUCH DELICA) lancets     insulin isophane human (HUMULIN N PEN) 100 UNIT/ML injection     insulin pen needle 31G X 5 MM     blood glucose monitoring (ONE TOUCH ULTRA 2) meter device kit     blood glucose monitoring (NO BRAND SPECIFIED) test strip     blood glucose monitoring (CARLOS ENRIQUE MICROLET) lancets     acetone, Urine, test STRP     hydroxyprogesterone caproate in oil 250 mg/mL (LYDIA) intramuscular injection     acetaminophen (TYLENOL) 325 MG tablet     docusate sodium (COLACE) 100 MG tablet     ondansetron (ZOFRAN ODT) 4 MG ODT tab     acetaminophen (TYLENOL) 500 MG tablet     Prenatal Vit-Fe Fumarate-FA (PRENATAL MULTIVITAMIN  PLUS IRON) 27-0.8 MG TABS per tablet     No current facility-administered medications for this visit.             Allergies:     Allergies   Allergen Reactions     Blood Transfusion Related (Informational Only) Other (See Comments)     Patient has a history of a clinically significant antibody against RBC antigens.  A delay in compatible RBCs may occur.     Fish Allergy Hives            Social History:     Social History     Social History     Marital status:      Spouse name: gerardo Pozo     Number of children: 1     Years of education: N/A     Occupational History           Social History Main Topics     Smoking status: Never Smoker     Smokeless tobacco: Never Used     Alcohol use No     Drug use: No     Sexual activity: Yes     Partners: Male     Birth control/ protection: None     Other Topics Concern     Not on file     Social History Narrative    From [place] [facility] to [place] in [place].             Family History:     Family History   Problem Relation Age of Onset     KIDNEY DISEASE Mother      Dead     Unknown/Adopted Father               Review of Systems:   10 " "points ROS was obtained and highlighted in the HPI, otherwise negative.          Physical Exam:   VS:  /66 (BP Location: Left arm, Patient Position: Chair, Cuff Size: Adult Large)  Pulse 79  Ht 1.499 m (4' 11\")  Wt 65 kg (143 lb 6.4 oz)  LMP 03/28/2017 (Within Weeks)  BMI 28.96 kg/m2      Gen: A&Ox3, NAD  HEENT: non-icteric, oropharynx clear  CV: RRR  Lung: CTAB  Abd: soft, NT, ND appropriately gravid.  Ext: 1+ edema, intact pulses.   Skin: no stigmata of chronic liver disease.   Neuro: no focal deficits, grossly intact, no asterixis   Psych: appropriate mood and affects         Data:   Reviewed in person and significant for:  Lab Results   Component Value Date    WBC 9.3 10/09/2017     Lab Results   Component Value Date    RBC 4.24 10/09/2017     Lab Results   Component Value Date    HGB 13.1 10/09/2017     Lab Results   Component Value Date    HCT 38.1 10/09/2017     No components found for: MCT  Lab Results   Component Value Date    MCV 90 10/09/2017     Lab Results   Component Value Date    MCH 30.9 10/09/2017     Lab Results   Component Value Date    MCHC 34.4 10/09/2017     Lab Results   Component Value Date    RDW 13.2 10/09/2017     Lab Results   Component Value Date     10/09/2017     Last Basic Metabolic Panel:  Lab Results   Component Value Date     10/09/2017      Lab Results   Component Value Date    POTASSIUM 3.6 10/09/2017     Lab Results   Component Value Date    CHLORIDE 104 10/09/2017     Lab Results   Component Value Date    DIANNE 8.5 10/09/2017     Lab Results   Component Value Date    CO2 25 10/09/2017     Lab Results   Component Value Date    BUN 11 10/09/2017     Lab Results   Component Value Date    CR 0.57 10/09/2017     Lab Results   Component Value Date    GLC 90 10/09/2017     Liver Function Studies -   Recent Labs   Lab Test  10/09/17   1001   PROTTOTAL  6.7*   ALBUMIN  2.8*   BILITOTAL  0.4   ALKPHOS  60   AST  12   ALT  21       "

## 2017-10-26 NOTE — NURSING NOTE
"Reji Lockwood's goals for this visit include:   Chief Complaint   Patient presents with     RECHECK     She requests these members of her care team be copied on today's visit information: YES - PCP     Initial /66 (BP Location: Left arm, Patient Position: Chair, Cuff Size: Adult Large)  Pulse 79  Ht 1.499 m (4' 11\")  Wt 65 kg (143 lb 6.4 oz)  LMP 03/28/2017 (Within Weeks)  BMI 28.96 kg/m2 Estimated body mass index is 28.96 kg/(m^2) as calculated from the following:    Height as of this encounter: 1.499 m (4' 11\").    Weight as of this encounter: 65 kg (143 lb 6.4 oz).  BP completed using cuff size: large        "

## 2017-10-26 NOTE — MR AVS SNAPSHOT
After Visit Summary   10/26/2017    Reji Lockwood    MRN: [id number]           Patient Information     Date Of Birth          1980        Visit Information        Provider Department      10/26/2017 2:45 PM Breana Hood MD; [employer] [facility]        Today's Diagnoses     Viral hepatitis B chronic (H)    -  1      Care Instructions    Starting Tenofovir now, then 3 weeks check labs (around 11/16/17)  - if all is stable, then continue treatment until we meet again in March or April with labs           Follow-ups after your visit        Your next 10 appointments already scheduled     Oct 27, 2017 10:45 AM CDT   Nurse Only with NURSE ONLY WOMENS   [facility] ([facility])    [address] [place] [address]   [phone]            Oct 30, 2017  4:00 PM CDT   Telephone Visit with Mg Cde Provider   [facility] ([facility])    [address] [facility] [address]   [phone]           Note: this is not an onsite visit; there is no need to come to the facility.            Nov 03, 2017 11:00 AM CDT   [facility]   [facility] ([facility])    [address] [address]   [phone]            Nov 03, 2017  2:45 PM CDT   ESTABLISHED PRENATAL with Main Norwood MD   [facility] ([facility])    [address] [facility] [address]   [phone]            Nov 07, 2017  1:00 PM CST   New Visit with Autumn Urban MD   [facility] ([facility])    [address] [facility] [address]   [phone]            Mar 08, 2018 10:40 AM CST   Return Visit with Breana Hood MD   [facility] ([facility])    [address] [facility]  [place] [address]   [phone]              Future tests that were ordered for you today     Open Future Orders        Priority Expected Expires Ordered    CBC with platelets Routine 4/16/2018 10/26/2018 10/26/2017    Hepatic panel (Albumin, ALT, AST, Bili, Alk Phos, TP) Routine 4/16/2018 10/26/2018 10/26/2017    INR Routine 4/16/2018 10/26/2018 10/26/2017    Hep B Virus DNA Quant Real Time PCR Routine 4/16/2018 10/26/2018 10/26/2017    Basic metabolic panel  (Ca, Cl, CO2, Creat, Gluc, K, Na, BUN) Routine 4/16/2018 10/26/2018 10/26/2017    CBC with platelets Routine 11/16/2017 7/26/2018 10/26/2017    Basic metabolic panel  (Ca, Cl, CO2, Creat, Gluc, K, Na, BUN) Routine 11/16/2017 7/26/2018 10/26/2017    Hepatic panel (Albumin, ALT, AST, Bili, Alk Phos, TP) Routine 11/16/2017 7/26/2018 10/26/2017    INR Routine 11/16/2017 7/26/2018 10/26/2017    Hep B Virus DNA Quant Real Time PCR Routine 11/16/2017 7/26/2018 10/26/2017            Who to contact     If you have questions or need follow up information about today's clinic visit or your schedule please contact [facility] directly at [phone].  Normal or non-critical lab and imaging results will be communicated to you by Explore Engagehart, letter or phone within 4 business days after the clinic has received the results. If you do not hear from us within 7 days, please contact the clinic through X[employer] Gr[employer] or phone. If you have a critical or abnormal lab result, we will notify you by phone as soon as possible.  Submit refill requests through WeStore or call your pharmacy and they will forward the refill request to us. Please allow 3 business days for your refill to be completed.          Additional Information About Your Visit        Explore EngageharTabbedOut Information     WeStore is an electronic gateway that provides easy, online access to your medical records. With WeStore, you can request a clinic appointment, read your test results, renew a prescription or communicate with  "your care team.     To sign up for MyChart visit the website at [email or web address]   You will be asked to enter the access code listed below, as well as some personal information. Please follow the directions to create your username and password.     Your access code is: [email or web address]  Expires: 12/10/2017  3:39 PM     Your access code will  in 90 days. If you need help or a new code, please contact your [facility] or call [phone] for assistance.        Care EveryWhere ID     This is your Care EveryWhere ID. This could be used by other organizations to access your [place] medical records  [id number]        Your Vitals Were     Pulse Height Last Period BMI (Body Mass Index)          79 1.499 m (4' 11\") 2017 (Within Weeks) 28.96 kg/m2         Blood Pressure from Last 3 Encounters:   10/26/17 106/66   10/11/17 116/71   10/06/17 104/67    Weight from Last 3 Encounters:   10/26/17 65 kg (143 lb 6.4 oz)   10/11/17 64.4 kg (142 lb)   17 62.6 kg (138 lb)                 Today's Medication Changes          These changes are accurate as of: 10/26/17  4:04 PM.  If you have any questions, ask your nurse or doctor.               Start taking these medicines.        Dose/Directions    tenofovir 300 MG tablet   Commonly known as:  VIREAD   Used for:  Viral hepatitis B chronic (H)   Started by:  Breana Hood MD        Dose:  300 mg   Take 1 tablet (300 mg) by mouth daily   Quantity:  90 tablet   Refills:  3            Where to get your medicines      These medications were sent to [email or web address] - [facility], [place] - [address]  [address] [facility] [address]     Phone:  [phone]     tenofovir 300 MG tablet                Primary Care Provider Office Phone # Fax #    Fadia Georgia Serrano [phone] [address] [facility] [address]      "   Equal Access to Services     [facility] : Hadii laurie mark blanca Giang, waitzda luqadaha, qaybta katimana solorzano. So [facility] [phone].    ATENCIÓN: Si habla español, tiene a gilliam disposición servicios gratuitos de asistencia lingüística. Eugeniaame al [phone].    We comply with applicable federal civil rights laws and [place] laws. We do not discriminate on the basis of race, color, national origin, age, disability, sex, sexual orientation, or gender identity.            Thank you!     Thank you for choosing [facility]  for your care. Our goal is always to provide you with excellent care. Hearing back from our patients is one way we can continue to improve our services. Please take a few minutes to complete the written survey that you may receive in the mail after your visit with us. Thank you!             Your Updated Medication List - Protect others around you: Learn how to safely use, store and throw away your medicines at www.disposemymeds.org.          This list is accurate as of: 10/26/17  4:04 PM.  Always use your most recent med list.                   Brand Name Dispense Instructions for use Diagnosis    * acetaminophen 500 MG tablet    TYLENOL     Take 500-1,000 mg by mouth        * acetaminophen 325 MG tablet    TYLENOL    100 tablet    Take 2 tablets (650 mg) by mouth every 4 hours as needed for mild pain or fever    Cervical insufficiency during pregnancy, antepartum, second trimester       acetone (Urine) test Strp     25 each    Use one strip daily to check for urine ketones    Abnormal maternal glucose tolerance, antepartum       * blood glucose monitoring lancets     100 each    Use to test blood sugar 4 times daily or as directed.    Abnormal maternal glucose tolerance, antepartum       * blood glucose monitoring lancets     100 each    Use to test blood sugar 4 times daily or as directed.    Gestational diabetes mellitus        "blood glucose monitoring meter device kit     1 kit    Use to test blood sugar 4 times daily or as directed.    Gestational diabetes mellitus (GDM), antepartum, gestational diabetes method of control unspecified       blood glucose monitoring test strip    no brand specified    200 strip    Use to test blood sugars 4 times daily or as directed    Abnormal maternal glucose tolerance, antepartum       docusate sodium 100 MG tablet    COLACE    60 tablet    Take 100 mg by mouth daily    Cervical insufficiency during pregnancy, antepartum, second trimester       hydroxyprogesterone caproate in oil 250 mg/mL intramuscular injection    LYDIA    5 mL    Inject 1 mL (250 mg) into the muscle every 7 days    Cervical insufficiency during pregnancy, antepartum, second trimester, Supervision of high risk pregnancy, antepartum, second trimester, History of  delivery, currently pregnant in second trimester       * insulin  UNIT/ML injection    NovoLIN N VIAL    10 mL    5 units before breakfast and 5 units before dinner    Gestational diabetes mellitus       * insulin isophane human 100 UNIT/ML injection    HumuLIN N PEN    15 mL    Take 5 units before breakfast  And 5 units before dinner    Gestational diabetes mellitus       insulin pen needle 31G X 5 MM     100 each    Use 2 pen needles daily or as directed.    Gestational diabetes mellitus       insulin syringe-needle U-100 31G X 15/64\" 0.3 ML     100 each    Use 2 syringes daily or as directed.    Gestational diabetes mellitus       ondansetron 4 MG ODT tab    ZOFRAN ODT    30 tablet    Take 1-2 tablets (4-8 mg) by mouth every 8 hours as needed for nausea    Nausea/vomiting in pregnancy       prenatal multivitamin plus iron 27-0.8 MG Tabs per tablet     100 tablet    Take 1 tablet by mouth daily    Pregnancy test positive       tenofovir 300 MG tablet    VIREAD    90 tablet    Take 1 tablet (300 mg) by mouth daily    Viral hepatitis B chronic (H)       * " Notice:  This list has 6 medication(s) that are the same as other medications prescribed for you. Read the directions carefully, and ask your doctor or other care provider to review them with you.

## 2017-10-27 ENCOUNTER — ALLIED HEALTH/NURSE VISIT (OUTPATIENT)
Dept: NURSING | Facility: CLINIC | Age: 37
End: 2017-10-27
Payer: COMMERCIAL

## 2017-10-27 VITALS
DIASTOLIC BLOOD PRESSURE: 68 MMHG | HEART RATE: 74 BPM | RESPIRATION RATE: 16 BRPM | SYSTOLIC BLOOD PRESSURE: 118 MMHG | TEMPERATURE: 97.7 F | OXYGEN SATURATION: 99 %

## 2017-10-27 DIAGNOSIS — O09.92 SUPERVISION OF HIGH RISK PREGNANCY, ANTEPARTUM, SECOND TRIMESTER: ICD-10-CM

## 2017-10-27 DIAGNOSIS — O09.522 AMA (ADVANCED MATERNAL AGE) MULTIGRAVIDA 35+, SECOND TRIMESTER: ICD-10-CM

## 2017-10-27 DIAGNOSIS — O34.32 CERVICAL INSUFFICIENCY DURING PREGNANCY, ANTEPARTUM, SECOND TRIMESTER: ICD-10-CM

## 2017-10-27 PROCEDURE — 99207 ZZC NO CHARGE NURSE ONLY: CPT

## 2017-10-27 PROCEDURE — 96372 THER/PROPH/DIAG INJ SC/IM: CPT

## 2017-10-27 NOTE — NURSING NOTE
The following medication was given:     MEDICATION: Lakshmi  ROUTE: IM  SITE: RUQ - Gluteus  DOSE: 250 mg/ml  LOT #: [id number]  :  [employer]  EXPIRATION DATE:  11/2019  NDC#: 62781-147-44  Patient is here with .  Medication taken form clinic stock.  Patient tolerated the injections well.  She denied the localized itch that she had been experiencing post injection. Scheduled to return in 1 week.  Vianey Perry RN

## 2017-10-27 NOTE — MR AVS SNAPSHOT
After Visit Summary   10/27/2017    Reji Lockwood    MRN: [id number]           Patient Information     Date Of Birth          1980        Visit Information        Provider Department      10/27/2017 10:45 AM [employer]; [facility]        Today's Diagnoses     Cervical insufficiency during pregnancy, antepartum, second trimester        Supervision of high risk pregnancy, antepartum, second trimester        AMA (advanced maternal age) multigravida 35+, second trimester           Follow-ups after your visit        Your next 10 appointments already scheduled     Oct 30, 2017  4:00 PM CDT   Telephone Visit with Mg Cde Provider   [facility]    [address] [place] [address] [facility] [address]   [phone]           Note: this is not an onsite visit; there is no need to come to the facility.            Nov 03, 2017 11:00 AM CDT   Nurse Only with NURSE ONLY [facility] ([facility])    [address] [facility] [address] [address]   [phone]            Nov 03, 2017  2:45 PM CDT   ESTABLISHED PRENATAL with Main Norwood MD   [facility]    [address] [place] [address]   [phone]            Nov 07, 2017  1:00 PM CST   New Visit with Autumn Urban MD   [facility])    [address] [place] [address] [address] [facility] [address]   [phone]            Mar 08, 2018 10:40 AM CST   Return Visit with Breana Hood MD   [facility] ([facility])    [address] [facility] [address]   [phone]              Future tests that were ordered for you today     Open Future Orders        Priority Expected Expires Ordered    CBC with platelets Routine 4/16/2018 10/26/2018 10/26/2017     "Hepatic panel (Albumin, ALT, AST, Bili, Alk Phos, TP) Routine 4/16/2018 10/26/2018 10/26/2017    INR Routine 4/16/2018 10/26/2018 10/26/2017    Hep B Virus DNA Quant Real Time PCR Routine 4/16/2018 10/26/2018 10/26/2017    Basic metabolic panel  (Ca, Cl, CO2, Creat, Gluc, K, Na, BUN) Routine 4/16/2018 10/26/2018 10/26/2017    CBC with platelets Routine 11/16/2017 7/26/2018 10/26/2017    Basic metabolic panel  (Ca, Cl, CO2, Creat, Gluc, K, Na, BUN) Routine 11/16/2017 7/26/2018 10/26/2017    Hepatic panel (Albumin, ALT, AST, Bili, Alk Phos, TP) Routine 11/16/2017 7/26/2018 10/26/2017    INR Routine 11/16/2017 7/26/2018 10/26/2017    Hep B Virus DNA Quant Real Time PCR Routine 11/16/2017 7/26/2018 10/26/2017            Who to contact     If you have questions or need follow up information about today's clinic visit or your schedule please contact [facility] directly at [phone].  Normal or non-critical lab and imaging results will be communicated to you by InteliWISE USAhart, letter or phone within 4 business days after the clinic has received the results. If you do not hear from us within 7 days, please contact the clinic through InteliWISE USAhart or phone. If you have a critical or abnormal lab result, we will notify you by phone as soon as possible.  Submit refill requests through .Club Domains or call your pharmacy and they will forward the refill request to us. Please allow 3 business days for your refill to be completed.          Additional Information About Your Visit        .Club Domains Information     .Club Domains lets you send messages to your doctor, view your test results, renew your prescriptions, schedule appointments and more. To sign up, go to [email or web address] . Click on \"Log in\" on the left side of the screen, which will take you to the Welcome page. Then click on \"Sign up Now\" on the right side of the page.     You will be asked to enter the access code listed below, as well as some personal information. " Please follow the directions to create your username and password.     Your access code is: [email or web address]  Expires: 12/10/2017  3:39 PM     Your access code will  in 90 days. If you need help or a new code, please call your [place] clinic or [phone].        Care EveryWhere ID     This is your Care EveryWhere ID. This could be used by other organizations to access your [place] medical records  [id number]        Your Vitals Were     Pulse Temperature Respirations Last Period Pulse Oximetry       74 97.7  F (36.5  C) (Oral) 16 2017 (Within Weeks) 99%        Blood Pressure from Last 3 Encounters:   10/27/17 118/68   10/26/17 106/66   10/11/17 116/71    Weight from Last 3 Encounters:   10/26/17 65 kg (143 lb 6.4 oz)   10/11/17 64.4 kg (142 lb)   17 62.6 kg (138 lb)              We Performed the Following     HYDROXYPROGESTERONE      INJECTION INTRAMUSCULAR OR SUB-Q        Primary Care Provider Office Phone # Fax #    Fadia Georgia Serrano [phone]       [address]  [facility] [address]        Equal Access to Services     [facility] : Hadii aad ku hadasho Soomaali, waaxda luqadaha, qaybta kaalmada adeegyada, waxay idiin haygeetan wilbert daily . So [facility] [phone].    ATENCIÓN: Si habla español, tiene a gilliam disposición servicios gratuitos de asistencia lingüística. Llame al [phone].    We comply with applicable federal civil rights laws and [place] laws. We do not discriminate on the basis of race, color, national origin, age, disability, sex, sexual orientation, or gender identity.            Thank you!     Thank you for choosing [facility]  for your care. Our goal is always to provide you with excellent care. Hearing back from our patients is one way we can continue to improve our services. Please take a few minutes to complete the written survey that you may receive in the mail after your visit with us. Thank  you!             Your Updated Medication List - Protect others around you: Learn how to safely use, store and throw away your medicines at www.disposemymeds.org.          This list is accurate as of: 10/27/17 11:40 AM.  Always use your most recent med list.                   Brand Name Dispense Instructions for use Diagnosis    * acetaminophen 500 MG tablet    TYLENOL     Take 500-1,000 mg by mouth        * acetaminophen 325 MG tablet    TYLENOL    100 tablet    Take 2 tablets (650 mg) by mouth every 4 hours as needed for mild pain or fever    Cervical insufficiency during pregnancy, antepartum, second trimester       acetone (Urine) test Strp     25 each    Use one strip daily to check for urine ketones    Abnormal maternal glucose tolerance, antepartum       * blood glucose monitoring lancets     100 each    Use to test blood sugar 4 times daily or as directed.    Abnormal maternal glucose tolerance, antepartum       * blood glucose monitoring lancets     100 each    Use to test blood sugar 4 times daily or as directed.    Gestational diabetes mellitus       blood glucose monitoring meter device kit     1 kit    Use to test blood sugar 4 times daily or as directed.    Gestational diabetes mellitus (GDM), antepartum, gestational diabetes method of control unspecified       blood glucose monitoring test strip    no brand specified    200 strip    Use to test blood sugars 4 times daily or as directed    Abnormal maternal glucose tolerance, antepartum       docusate sodium 100 MG tablet    COLACE    60 tablet    Take 100 mg by mouth daily    Cervical insufficiency during pregnancy, antepartum, second trimester       hydroxyprogesterone caproate in oil 250 mg/mL intramuscular injection    LYDIA    5 mL    Inject 1 mL (250 mg) into the muscle every 7 days    Cervical insufficiency during pregnancy, antepartum, second trimester, Supervision of high risk pregnancy, antepartum, second trimester, History of   "delivery, currently pregnant in second trimester       * insulin  UNIT/ML injection    NovoLIN N VIAL    10 mL    5 units before breakfast and 5 units before dinner    Gestational diabetes mellitus       * insulin isophane human 100 UNIT/ML injection    HumuLIN N PEN    15 mL    Take 5 units before breakfast  And 5 units before dinner    Gestational diabetes mellitus       insulin pen needle 31G X 5 MM     100 each    Use 2 pen needles daily or as directed.    Gestational diabetes mellitus       insulin syringe-needle U-100 31G X 15/64\" 0.3 ML     100 each    Use 2 syringes daily or as directed.    Gestational diabetes mellitus       ondansetron 4 MG ODT tab    ZOFRAN ODT    30 tablet    Take 1-2 tablets (4-8 mg) by mouth every 8 hours as needed for nausea    Nausea/vomiting in pregnancy       prenatal multivitamin plus iron 27-0.8 MG Tabs per tablet     100 tablet    Take 1 tablet by mouth daily    Pregnancy test positive       tenofovir 300 MG tablet    VIREAD    90 tablet    Take 1 tablet (300 mg) by mouth daily    Viral hepatitis B chronic (H)       * Notice:  This list has 6 medication(s) that are the same as other medications prescribed for you. Read the directions carefully, and ask your doctor or other care provider to review them with you.      "

## 2017-10-30 ENCOUNTER — TELEPHONE (OUTPATIENT)
Dept: ENDOCRINOLOGY | Facility: CLINIC | Age: 37
End: 2017-10-30

## 2017-10-30 NOTE — TELEPHONE ENCOUNTER
Called pt for a bg report. No answer but left a vm advising pt I will call her again tommorrow for a bg report.     Shavon Luo, RN, BSN, CDE   [facility]

## 2017-10-31 ENCOUNTER — TELEPHONE (OUTPATIENT)
Dept: ENDOCRINOLOGY | Facility: CLINIC | Age: 37
End: 2017-10-31

## 2017-10-31 NOTE — TELEPHONE ENCOUNTER
Spoke with pt for a bg report. Gdm - started nph insulin last week. Checking bg fstg and 1 hr post meal. Readings are as follows:  10/31: 94  10/30: 85/123/147/156  10/29: 99/123/146/103  Pt advised to increase both am and pm dose nph from 5 units to 6 units. Pt advised will call her on fri for a bg update. Pt verbalized understanding.    Shavon Luo, RN, BSN, CDE   [facility]

## 2017-11-03 ENCOUNTER — ALLIED HEALTH/NURSE VISIT (OUTPATIENT)
Dept: NURSING | Facility: CLINIC | Age: 37
End: 2017-11-03
Payer: COMMERCIAL

## 2017-11-03 ENCOUNTER — PRENATAL OFFICE VISIT (OUTPATIENT)
Dept: OBGYN | Facility: CLINIC | Age: 37
End: 2017-11-03
Payer: COMMERCIAL

## 2017-11-03 ENCOUNTER — TELEPHONE (OUTPATIENT)
Dept: ENDOCRINOLOGY | Facility: CLINIC | Age: 37
End: 2017-11-03

## 2017-11-03 VITALS
BODY MASS INDEX: 28.88 KG/M2 | TEMPERATURE: 97.9 F | SYSTOLIC BLOOD PRESSURE: 105 MMHG | DIASTOLIC BLOOD PRESSURE: 72 MMHG | WEIGHT: 143 LBS | HEART RATE: 91 BPM

## 2017-11-03 DIAGNOSIS — O09.90 SUPERVISION OF HIGH RISK PREGNANCY, ANTEPARTUM: ICD-10-CM

## 2017-11-03 DIAGNOSIS — O34.32 CERVICAL INSUFFICIENCY DURING PREGNANCY, ANTEPARTUM, SECOND TRIMESTER: ICD-10-CM

## 2017-11-03 DIAGNOSIS — O34.30 CERVICAL INSUFFICIENCY DURING PREGNANCY, ANTEPARTUM: ICD-10-CM

## 2017-11-03 DIAGNOSIS — O09.92 SUPERVISION OF HIGH RISK PREGNANCY, ANTEPARTUM, SECOND TRIMESTER: ICD-10-CM

## 2017-11-03 DIAGNOSIS — O09.522 AMA (ADVANCED MATERNAL AGE) MULTIGRAVIDA 35+, SECOND TRIMESTER: ICD-10-CM

## 2017-11-03 PROCEDURE — 99207 ZZC PRENATAL VISIT: CPT | Performed by: OBSTETRICS & GYNECOLOGY

## 2017-11-03 PROCEDURE — 96372 THER/PROPH/DIAG INJ SC/IM: CPT

## 2017-11-03 PROCEDURE — 99207 ZZC NO CHARGE NURSE ONLY: CPT

## 2017-11-03 NOTE — NURSING NOTE
The following medication was given:     MEDICATION: Lakshmi  ROUTE: IM  SITE: LUQ - Gluteus  DOSE: 250 mg/1 ml  LOT #: [id number]  :  [employer]  EXPIRATION DATE:  11/2019  NDC#: 05016-346-11  Medication was taken from clinic stock.  Patient tolerated the injection well.  She continues to experience itching at the injection site.  This continues for several days after the injection and then subsides.  She uses heat for comfort.  She has discussed the itch with Dr. Norwood and this is a common side effect of the medication.  I scheduled the next 4 injections here at [facility].  Vianey Perry RN       ===View-only below this line===    >> VIANEY PERRY   Fri Oct 27, 2017 11:39 AM  The following medication was given:     MEDICATION: [place]  ROUTE: IM  SITE: RUQ - Gluteus  DOSE: 250 mg/ml  LOT #: [id number]  :  [employer]  EXPIRATION DATE:  11/2019  NDC#: 63160-657-31  Patient is here with .  Medication taken form clinic stock.  Patient tolerated the injections well.  She denied the localized itch that she had been experiencing post injection. Scheduled to return in 1 week.  Vianey Perry

## 2017-11-03 NOTE — MR AVS SNAPSHOT
After Visit Summary   11/3/2017    Reji Lockwood    MRN: [id number]           Patient Information     Date Of Birth          1980        Visit Information        Provider Department      11/3/2017 10:45 AM [employer] [facility]; NURSE ONLY WOMENS [facility]        Today's Diagnoses     Cervical insufficiency during pregnancy, antepartum, second trimester        Supervision of high risk pregnancy, antepartum, second trimester        AMA (advanced maternal age) multigravida 35+, second trimester           Follow-ups after your visit        Your next 10 appointments already scheduled     Nov 03, 2017  2:45 PM CDT   ESTABLISHED PRENATAL with Main Norwood MD   [facility]    [address] [facility] [address]   [phone]            Nov 03, 2017  4:15 PM CDT   Telephone Visit with Mg Nerie Provider   [facility]    [address]  [facility] [address]   [phone]           Note: this is not an onsite visit; there is no need to come to the facility.            Nov 07, 2017  1:00 PM CST   New Visit with Autumn Urban MD   [facility] ([facility]    [address]  [facility] [address]   [phone]            Nov 10, 2017 11:00 AM CST   Nurse Only with [facility]URSE ONLY [facility]   [facility] ([facility])    [address]  [facility] [address]   [phone]            Nov 17, 2017 11:00 AM CST   Nurse Only with NURSE ONLY [facility]   [facility] ([facility])    [address]  [facility] [address]   [phone]            Nov 24, 2017 10:00 AM CST   Nurse Only with NURSE ONLY [facility]   [facility] ([facility])    [address]  [facility]  "[address]   [phone]            Dec 01, 2017 11:00 AM CST   Nurse Only with NURSE ONLY WOMENS   [facility] ([facility])    [address]  [facility] [address] [phone]            Mar 08, 2018 10:40 AM CST   Return Visit with Breana Hood MD   [facility]    [address]  [facility] [address] [phone]              Who to contact     If you have questions or need follow up information about today's clinic visit or your schedule please contact [facility] directly at [phone].  Normal or non-critical lab and imaging results will be communicated to you by MyChart, letter or phone within 4 business days after the clinic has received the results. If you do not hear from us within 7 days, please contact the clinic through MyChart or phone. If you have a critical or abnormal lab result, we will notify you by phone as soon as possible.  Submit refill requests through [employer] or call your pharmacy and they will forward the refill request to us. Please allow 3 business days for your refill to be completed.          Additional Information About Your Visit        [employer] Information     [employer] lets you send messages to your doctor, view your test results, renew your prescriptions, schedule appointments and more. To sign up, go to [email or web address] . Click on \"Log in\" on the left side of the screen, which will take you to the Welcome page. Then click on \"Sign up Now\" on the right side of the page.     You will be asked to enter the access code listed below, as well as some personal information. Please follow the directions to create your username and password.     Your access code is: [email or web address]  Expires: 12/10/2017  3:39 PM     Your access code will  in 90 days. If you need help or a new code, please call your [facility] or [phone].        Care " EveryWhere ID     This is your Care EveryWhere ID. This could be used by other organizations to access your [place] medical records  [id number]        Your Vitals Were     Last Period                   03/28/2017 (Within Weeks)            Blood Pressure from Last 3 Encounters:   10/27/17 118/68   10/26/17 106/66   10/11/17 116/71    Weight from Last 3 Encounters:   10/26/17 65 kg (143 lb 6.4 oz)   10/11/17 64.4 kg (142 lb)   09/13/17 62.6 kg (138 lb)              We Performed the Following     HYDROXYPROGESTERONE      INJECTION INTRAMUSCULAR OR SUB-Q        Primary Care Provider Office Phone # Fax #    Fadia Weberche Serrano [phone] [address]  [facility] [address]        Equal Access to Services     [facility]: Hadii aad ku hadasho Soomaali, waaxda luqadaha, qaybta kaalmada adeegyada, ana irelandin hayaache daily . So [facility] [phone].    ATENCIÓN: Si habla español, tiene a gilliam disposición servicios gratuitos de asistencia lingüística. Llame al [phone].    We comply with applicable federal civil rights laws and [place] laws. We do not discriminate on the basis of race, color, national origin, age, disability, sex, sexual orientation, or gender identity.            Thank you!     Thank you for choosing [facility]  for your care. Our goal is always to provide you with excellent care. Hearing back from our patients is one way we can continue to improve our services. Please take a few minutes to complete the written survey that you may receive in the mail after your visit with us. Thank you!             Your Updated Medication List - Protect others around you: Learn how to safely use, store and throw away your medicines at www.disposemymeds.org.          This list is accurate as of: 11/3/17 11:22 AM.  Always use your most recent med list.                   Brand Name Dispense Instructions for use Diagnosis    *  acetaminophen 500 MG tablet    TYLENOL     Take 500-1,000 mg by mouth        * acetaminophen 325 MG tablet    TYLENOL    100 tablet    Take 2 tablets (650 mg) by mouth every 4 hours as needed for mild pain or fever    Cervical insufficiency during pregnancy, antepartum, second trimester       acetone (Urine) test Strp     25 each    Use one strip daily to check for urine ketones    Abnormal maternal glucose tolerance, antepartum       * blood glucose monitoring lancets     100 each    Use to test blood sugar 4 times daily or as directed.    Abnormal maternal glucose tolerance, antepartum       * blood glucose monitoring lancets     100 each    Use to test blood sugar 4 times daily or as directed.    Gestational diabetes mellitus       blood glucose monitoring meter device kit     1 kit    Use to test blood sugar 4 times daily or as directed.    Gestational diabetes mellitus (GDM), antepartum, gestational diabetes method of control unspecified       blood glucose monitoring test strip    no brand specified    200 strip    Use to test blood sugars 4 times daily or as directed    Abnormal maternal glucose tolerance, antepartum       docusate sodium 100 MG tablet    COLACE    60 tablet    Take 100 mg by mouth daily    Cervical insufficiency during pregnancy, antepartum, second trimester       hydroxyprogesterone caproate in oil 250 mg/mL intramuscular injection    LYDIA    5 mL    Inject 1 mL (250 mg) into the muscle every 7 days    Cervical insufficiency during pregnancy, antepartum, second trimester, Supervision of high risk pregnancy, antepartum, second trimester, History of  delivery, currently pregnant in second trimester       * insulin  UNIT/ML injection    NovoLIN N VIAL    10 mL    5 units before breakfast and 5 units before dinner    Gestational diabetes mellitus       * insulin isophane human 100 UNIT/ML injection    HumuLIN N PEN    15 mL    Take 5 units before breakfast  And 5 units before  "dinner    Gestational diabetes mellitus       insulin pen needle 31G X 5 MM     100 each    Use 2 pen needles daily or as directed.    Gestational diabetes mellitus       insulin syringe-needle U-100 31G X 15/64\" 0.3 ML     100 each    Use 2 syringes daily or as directed.    Gestational diabetes mellitus       ondansetron 4 MG ODT tab    ZOFRAN ODT    30 tablet    Take 1-2 tablets (4-8 mg) by mouth every 8 hours as needed for nausea    Nausea/vomiting in pregnancy       prenatal multivitamin plus iron 27-0.8 MG Tabs per tablet     100 tablet    Take 1 tablet by mouth daily    Pregnancy test positive       tenofovir 300 MG tablet    VIREAD    90 tablet    Take 1 tablet (300 mg) by mouth daily    Viral hepatitis B chronic (H)       * Notice:  This list has 6 medication(s) that are the same as other medications prescribed for you. Read the directions carefully, and ask your doctor or other care provider to review them with you.      "

## 2017-11-03 NOTE — MR AVS SNAPSHOT
After Visit Summary   11/3/2017    Reji Lockwood    MRN: [id number]           Patient Information     Date Of Birth          1980        Visit Information        Provider Department      11/3/2017 2:45 PM Main Norwood MD; [employer] [facility] [employer] [facility]        Today's Diagnoses     Cervical insufficiency during pregnancy, antepartum        Supervision of high risk pregnancy, antepartum        AMA (advanced maternal age) multigravida 35+          Care Instructions                                                        If you have any questions regarding your visit, Please contact your care team.     "[employer] [facility]: [phone]    [facility] CLINIC HOURS TELEPHONE NUMBER   Main Norwood M.D.      Cristal-    Maddie Penaloza-RN    Sudhai-Medical Assistant   Monday-[place]  8:00a.m-4:45 p.m  Wednesday-[place] 8:00a.m-4:45 p.m.  Thursday-[place]  8:00a.m-4:45 p.m.  Friday-[place]  8:00a.m-4:45 p.m. [facility]  [address]  [place], [place] [address] [phone] ask for [facility]  [phone] Fax  Imaging Eujfbmjxmq-[phone]    [facility] Labor and Delivery  [address] [place] [address] [place] [address]  [phone]    [facility]  [address] [facility] [place] [address] [phone] ask [facility]  [phone] Fax  Imaging [phone]     Urgent Care locations:    [facility] Monday-Friday  5 pm - 9 pm  Saturday and Sunday   9 am - 5 pm    Monday-Friday   11 am - 9 pm  Saturday and Sunday   9 am - 5 pm   [phone]       If you need a medication refill, please contact your pharmacy. Please allow 3 business days for your refill to be completed.  As always, Thank you for trusting us with your healthcare needs!            Follow-ups after your visit        Your next 10 appointments already scheduled     Nov 03,  2017  4:15 PM CDT   Telephone Visit with Mg Cde Provider   [facility]    [address]  [facility] [address]   [phone]           Note: this is not an onsite visit; there is no need to come to the facility.            Nov 07, 2017 12:45 PM CST   New Visit with Autumn Urban MD   [facility]    [address]  [facility] [address]   [phone]            Nov 10, 2017 11:00 AM CST   Nurse Only with NURSE ONLY WOMENS   [facility] ([facility])    [address]  [facility] [address]   [phone]            Nov 17, 2017 11:00 AM CST   Nurse Only with NURSE ONLY WOMENS   [facility] ([facility])    [address]  [facility] [address]   [phone]            Nov 24, 2017 10:00 AM CST   Nurse Only with NURSE ONLY WOMENS   [facility] ([facility])    [address]  [facility] [address]   [phone]            Dec 01, 2017 11:00 AM CST   Nurse Only with NURSE ONLY WOMENS   [facility] ([facility])    [address]  [facility] [address]   [phone]            Mar 08, 2018 10:40 AM CST   Return Visit with Breana Hood MD   [facility] ([facility])    [address]  [facility] [address]   [phone]              Who to contact     If you have questions or need follow up information about today's clinic visit or your schedule please contact [facility] KIMI RAYMOND directly at [phone].  Normal or non-critical lab and imaging results will be communicated to you by MyChart, letter or phone within 4 business days after the clinic has received the results. If you do not hear from us within 7 days, please contact  "the clinic through [employer] or phone. If you have a critical or abnormal lab result, we will notify you by phone as soon as possible.  Submit refill requests through [employer] or call your pharmacy and they will forward the refill request to us. Please allow 3 business days for your refill to be completed.          Additional Information About Your Visit        [employer] Information     [employer] lets you send messages to your doctor, view your test results, renew your prescriptions, schedule appointments and more. To sign up, go to [email or web address] . Click on \"Log in\" on the left side of the screen, which will take you to the Welcome page. Then click on \"Sign up Now\" on the right side of the page.     You will be asked to enter the access code listed below, as well as some personal information. Please follow the directions to create your username and password.     Your access code is: [email or web address]  Expires: 12/10/2017  3:39 PM     Your access code will  in 90 days. If you need help or a new code, please call your [place] clinic or [phone].        Care EveryWhere ID     This is your Care EveryWhere ID. This could be used by other organizations to access your [place] medical records  [id number]        Your Vitals Were     Pulse Temperature Last Period Breastfeeding? BMI (Body Mass Index)       91 97.9  F (36.6  C) (Oral) 2017 (Within Weeks) No 28.88 kg/m2        Blood Pressure from Last 3 Encounters:   17 105/72   10/27/17 118/68   10/26/17 106/66    Weight from Last 3 Encounters:   17 143 lb (64.9 kg)   10/26/17 143 lb 6.4 oz (65 kg)   10/11/17 142 lb (64.4 kg)              Today, you had the following     No orders found for display         Today's Medication Changes          These changes are accurate as of: 11/3/17  2:52 PM.  If you have any questions, ask your nurse or doctor.               These medicines have changed or have updated prescriptions.        Dose/Directions    " acetaminophen 325 MG tablet   Commonly known as:  TYLENOL   This may have changed:  Another medication with the same name was removed. Continue taking this medication, and follow the directions you see here.   Used for:  Cervical insufficiency during pregnancy, antepartum, second trimester        Dose:  650 mg   Take 2 tablets (650 mg) by mouth every 4 hours as needed for mild pain or fever   Quantity:  100 tablet   Refills:  0                Primary Care Provider Office Phone # Fax #    Fadia Weberfransisco Serrano -[phone]       [address] [place]  [facility] [address]        Equal Access to Services     [facility]: Hadii aad ku hadasho Soomaali, waaxda luqadaha, qaybta kaalmada adeegyada, ana daily . So [facility] [phone].    ATENCIÓN: Si habla español, tiene a gilliam disposición servicios gratuitos de asistencia lingüística. Llame al [phone].    We comply with applicable federal civil rights laws and [place] laws. We do not discriminate on the basis of race, color, national origin, age, disability, sex, sexual orientation, or gender identity.            Thank you!     Thank you for choosing [facility]  for your care. Our goal is always to provide you with excellent care. Hearing back from our patients is one way we can continue to improve our services. Please take a few minutes to complete the written survey that you may receive in the mail after your visit with us. Thank you!             Your Updated Medication List - Protect others around you: Learn how to safely use, store and throw away your medicines at www.disposemymeds.org.          This list is accurate as of: 11/3/17  2:52 PM.  Always use your most recent med list.                   Brand Name Dispense Instructions for use Diagnosis    acetaminophen 325 MG tablet    TYLENOL    100 tablet    Take 2 tablets (650 mg) by mouth every 4 hours as needed for mild pain or fever     "Cervical insufficiency during pregnancy, antepartum, second trimester       acetone (Urine) test Strp     25 each    Use one strip daily to check for urine ketones    Abnormal maternal glucose tolerance, antepartum       * blood glucose monitoring lancets     100 each    Use to test blood sugar 4 times daily or as directed.    Abnormal maternal glucose tolerance, antepartum       * blood glucose monitoring lancets     100 each    Use to test blood sugar 4 times daily or as directed.    Gestational diabetes mellitus       blood glucose monitoring meter device kit     1 kit    Use to test blood sugar 4 times daily or as directed.    Gestational diabetes mellitus (GDM), antepartum, gestational diabetes method of control unspecified       blood glucose monitoring test strip    no brand specified    200 strip    Use to test blood sugars 4 times daily or as directed    Abnormal maternal glucose tolerance, antepartum       docusate sodium 100 MG tablet    COLACE    60 tablet    Take 100 mg by mouth daily    Cervical insufficiency during pregnancy, antepartum, second trimester       hydroxyprogesterone caproate in oil 250 mg/mL intramuscular injection    LYDIA    5 mL    Inject 1 mL (250 mg) into the muscle every 7 days    Cervical insufficiency during pregnancy, antepartum, second trimester, Supervision of high risk pregnancy, antepartum, second trimester, History of  delivery, currently pregnant in second trimester       * insulin  UNIT/ML injection    NovoLIN N VIAL    10 mL    5 units before breakfast and 5 units before dinner    Gestational diabetes mellitus       * insulin isophane human 100 UNIT/ML injection    HumuLIN N PEN    15 mL    Take 5 units before breakfast  And 5 units before dinner    Gestational diabetes mellitus       insulin pen needle 31G X 5 MM     100 each    Use 2 pen needles daily or as directed.    Gestational diabetes mellitus       insulin syringe-needle U-100 31G X 15/64\" 0.3 ML "     100 each    Use 2 syringes daily or as directed.    Gestational diabetes mellitus       ondansetron 4 MG ODT tab    ZOFRAN ODT    30 tablet    Take 1-2 tablets (4-8 mg) by mouth every 8 hours as needed for nausea    Nausea/vomiting in pregnancy       prenatal multivitamin plus iron 27-0.8 MG Tabs per tablet     100 tablet    Take 1 tablet by mouth daily    Pregnancy test positive       tenofovir 300 MG tablet    VIREAD    90 tablet    Take 1 tablet (300 mg) by mouth daily    Viral hepatitis B chronic (H)       * Notice:  This list has 4 medication(s) that are the same as other medications prescribed for you. Read the directions carefully, and ask your doctor or other care provider to review them with you.

## 2017-11-03 NOTE — NURSING NOTE
"Chief Complaint   Patient presents with     Prenatal Care     OBV 29w4d       Initial /72 (BP Location: Left arm, Patient Position: Chair, Cuff Size: Adult Regular)  Pulse 91  Temp 97.9  F (36.6  C) (Oral)  Wt 143 lb (64.9 kg)  LMP 03/28/2017 (Within Weeks)  Breastfeeding? No  BMI 28.88 kg/m2 Estimated body mass index is 28.88 kg/(m^2) as calculated from the following:    Height as of 10/26/17: 4' 11\" (1.499 m).    Weight as of this encounter: 143 lb (64.9 kg).  Medication Reconciliation: complete   Kezia Crump CMA      "

## 2017-11-03 NOTE — TELEPHONE ENCOUNTER
Left vm asking pt to call clinic to report bg levels. If unablel to call back before 5pm today, please call on Mon.     Shavon Luo RN, BSN, CDE   [facility]

## 2017-11-03 NOTE — PATIENT INSTRUCTIONS
If you have any questions regarding your visit, Please contact your care team.     [employer] [facility]: [phone]    [facility] CLINIC HOURS TELEPHONE NUMBER   ANN Zaragoza-    Maddie Penaloza-NAYA Mast-Medical Assistant   Monday-[place]  8:00a.m-4:45 p.m  Wednesday-[place] 8:00a.m-4:45 p.m.  Thursday-[place]  8:00a.m-4:45 p.m.  Friday-[place]  8:00a.m-4:45 p.m. [facility]  [address]  [place], [place] [address] [phone] ask [facility]  [phone] Fax  Imaging [phone]    [facility] Labor and Delivery  [address] [place] [address] Dr.  [place], [place] [address] [phone]    [facility]  [address] [place] [address]  [phone] ask [facility]  [phone] Fax  Imaging [phone]     Urgent Care locations:    [place]        [place] Monday-Friday  5 pm - 9 pm  Saturday and Sunday   9 am - 5 pm    Monday-Friday   11 am - 9 pm  Saturday and Sunday   9 am - 5 pm   [phone]       If you need a medication refill, please contact your pharmacy. Please allow 3 business days for your refill to be completed.  As always, Thank you for trusting us with your healthcare needs!

## 2017-11-05 NOTE — PROGRESS NOTES
No signif signs, symptoms or concerns. Gest DM controlled with insulin and diet per Endocrinology. Now on tenofovir for hep B and has GI follow-up. Continues on 17P weekly and cerclage to be removed at 36 weeks. Here with . Advice appropriate to gestational age reviewed including pertinent Checklist items. Discussed condition, tests and care plan including RBA. Problem list updated.   A/P:  Reji was seen today for prenatal care.    Diagnoses and all orders for this visit:    Cervical insufficiency during pregnancy, antepartum    Supervision of high risk pregnancy, antepartum    AMA (advanced maternal age) multigravida 35+        Main Norwood MD

## 2017-11-06 NOTE — TELEPHONE ENCOUNTER
Spoke with pt. Gdm, on nph: 6u in the am and 6u in the pm. Checking bg fasting and 1 hr post meal.  11/06: 93/110  11/05:  93/121/120/149  11/04: 84/106/136/132  11/03: 90/150/101/109.  Pt advised to not make any insulin dose changes at this time. Reminded of appt with Dr Urban tomorrow.     Shavon Luo, RN, BSN, CDE   [facility]

## 2017-11-06 NOTE — TELEPHONE ENCOUNTER
11.6.17  Patient is calling [employer]onProvender back.      Home Phone [phone]   Mobile [phone]

## 2017-11-07 ENCOUNTER — OFFICE VISIT (OUTPATIENT)
Dept: ENDOCRINOLOGY | Facility: CLINIC | Age: 37
End: 2017-11-07
Payer: COMMERCIAL

## 2017-11-07 VITALS
HEIGHT: 59 IN | DIASTOLIC BLOOD PRESSURE: 72 MMHG | HEART RATE: 83 BPM | SYSTOLIC BLOOD PRESSURE: 102 MMHG | WEIGHT: 145.06 LBS | BODY MASS INDEX: 29.24 KG/M2

## 2017-11-07 DIAGNOSIS — O24.414 GESTATIONAL DIABETES REQUIRING INSULIN: Primary | ICD-10-CM

## 2017-11-07 PROCEDURE — 99203 OFFICE O/P NEW LOW 30 MIN: CPT | Performed by: INTERNAL MEDICINE

## 2017-11-07 NOTE — PATIENT INSTRUCTIONS
Increased the evening dose of NPH to 8 U   Check BG fasting, 1 hr post meals and, once a day, before lunch and before dinner    Send us the BG numbers (or have the meter downloaded in the clinic), in 1-2 weeks       Sending blood sugars to your provider at [facility]:  We want to help you with your diabetes management, which often requires frequent adjustments to your therapy. For your convenience, we have several ways to send your blood sugars to your doctor for review.    - Send message directly to your doctor through My Chart.  Please ask the rooming staff if you would like to sign up for My Chart.  This is a fast and confidential way to send your information and communicate directly with your provider.   - Record readings and fax to [phone].  We have a template for you to use for your convenience.  - If you have a [employer] pump, upload to [facility] and notify your provider of your username and password.   - Stop by the clinic with your meter for download.   - My Chart or call Shavon Luo, Diabetes Educator at [phone]  - Call the clinic and speak to one of the endocrine nurses to relay information on the telephone.  Nadeen Marques, or Sneha at [phone].   -    Please call the on-call Endocrinologist at the [facility] for after       hours/weekend needs at [phone] Option #4.    Please note that you do not need to FAST if you are just having an A1C drawn. Please remember to ALWAYS bring your glucose meter with to your appointment. This data is very important for the management of your care.    Thank you!  Your [facility] Team

## 2017-11-07 NOTE — NURSING NOTE
"Reji Lockwood's goals for this visit include:   Chief Complaint   Patient presents with     Diabetes     gestational       She requests these members of her care team be copied on today's visit information: Fadia Sauecda      PCP: Fadia Sauceda    Referring Provider:  No referring provider defined for this encounter.    Chief Complaint   Patient presents with     Diabetes     gestational       Initial /72  Pulse 83  Ht 1.486 m (4' 10.5\")  Wt 65.8 kg (145 lb 1 oz)  LMP 03/28/2017 (Within Weeks)  BMI 29.8 kg/m2 Estimated body mass index is 29.8 kg/(m^2) as calculated from the following:    Height as of this encounter: 1.486 m (4' 10.5\").    Weight as of this encounter: 65.8 kg (145 lb 1 oz).  Medication Reconciliation: complete    Do you need any medication refills at today's visit? No    Sneha Linares LPN      "

## 2017-11-07 NOTE — LETTER
2017       RE: Reji Lockwood  [address] [place] [address]     Dear Colleague,    Thank you for referring your patient, Reji Lockwood, to the [facility] at [facility]. Please see a copy of my visit note below.      The patient is referred for evaluation of gestational diabetes.    Reji Lockwood is a 37 year old [facility] female diagnosed with gestational diabetes in her 3rd trimester.  She is now 30 weeks pregnant.  The estimated delivery date is 1/15/18.  This is her 2nd pregnancy.  The 1st pregnancy occurred in  and was not complicated by diabetes. The patient delivered prematurely, at 24 weeks.  The baby was born with a weight of 1 pound and 5 ounces.  Hemoglobin A1c was 5.6%, on 10/18/17.   She was started on NPH by the diabetes educator and currently, she has been taking 6 U NPH in the morning (9-9:30 AM) and 6 U NPH at 6-7 PM, before dinner.    Today, the patient denies any specific complaints, with the exception of some difficulty sleeping at night, noticed in the 3rd trimester.    She has been checking her blood sugars in the morning and one hour after eating.  The glucometer reveals that she checks her blood sugar 4 times daily.  Average blood glucose in the last month is 122 with a standard deviation of 26 and a range variable between 82 and 196. Since being on 6 U NPH twice daily, her postprandial blood glucose is at target.  However, the morning blood sugar has been variable between 93 and 100.    She denies experiencing hypoglycemic episodes.  In a regular day, she has 3 meals and she rarely snacks, mainly on fruits (not at bedtime).    Her mother was diagnosed with diabetes in her 50s and she used to take insulin. She  at age 60, of MI.     Past Medical History   Past Medical History:   Diagnosis Date     Chronic viral hepatitis B without delta agent and without coma (H)      Past Surgical Hystory   Past Surgical History:   Procedure  "Laterality Date     CERCLAGE CERVICAL N/A 9/11/2017    Procedure: CERCLAGE CERVICAL;  Cervical Cerclage ;  Surgeon: Ez Erazo MD;  Location: [facility] L+D     Current Medications   Prescription Medications as of 11/7/2017             tenofovir (VIREAD) 300 MG tablet Take 1 tablet (300 mg) by mouth daily    insulin syringe-needle U-100 31G X 15/64\" 0.3 ML Use 2 syringes daily or as directed.    insulin isophane human (HUMULIN N PEN) 100 UNIT/ML injection Take 5 units before breakfast  And 5 units before dinner    insulin pen needle 31G X 5 MM Use 2 pen needles daily or as directed.    hydroxyprogesterone caproate in oil 250 mg/mL (LYDIA) intramuscular injection Inject 1 mL (250 mg) into the muscle every 7 days    Prenatal Vit-Fe Fumarate-FA (PRENATAL MULTIVITAMIN  PLUS IRON) 27-0.8 MG TABS per tablet Take 1 tablet by mouth daily    insulin NPH (NOVOLIN N VIAL) 100 UNIT/ML injection 5 units before breakfast and 5 units before dinner    blood glucose monitoring (ONE TOUCH DELICA) lancets Use to test blood sugar 4 times daily or as directed.    blood glucose monitoring (ONE TOUCH ULTRA 2) meter device kit Use to test blood sugar 4 times daily or as directed.    blood glucose monitoring (NO BRAND SPECIFIED) test strip Use to test blood sugars 4 times daily or as directed    blood glucose monitoring (CARLOS ENRIQUE MICROLET) lancets Use to test blood sugar 4 times daily or as directed.    acetone, Urine, test STRP Use one strip daily to check for urine ketones    acetaminophen (TYLENOL) 325 MG tablet Take 2 tablets (650 mg) by mouth every 4 hours as needed for mild pain or fever    docusate sodium (COLACE) 100 MG tablet Take 100 mg by mouth daily    ondansetron (ZOFRAN ODT) 4 MG ODT tab Take 1-2 tablets (4-8 mg) by mouth every 8 hours as needed for nausea        Family History   As above     Social History  Emigrated from [place] to [place] in [place].    with 2 children. She denies smoking, drinking alcohol or using illicit drugs. " "Occupation:  line.     Review of Systems   Systemic:              No significant fatigue   Eye:                       No eye symptoms   Coco-Laryngeal:      No coco-laryngeal symptoms, no dysphagia, no voice hoarseness, no cough      Breast:                   No breast symptoms  Cardiovascular:     No cardiovascular symptoms, no CP or palpitations   Pulmonary:            No pulmonary symptoms, no cough or SOB    Gastrointestinal:    No gastrointestinal symptoms, no diarrhea or constipation   Genitourinary:        Urinates 3-4 times a night   Endocrine:             No endocrine symptoms, no heat or cold intolerance   Neurological:          No neurological symptoms, no headaches, no tremor, no dizziness     Musculoskeletal:    Mild low back pain   Skin:                       No skin symptoms   Psychological:       No psychological symptoms                Vital Signs     Previous Weights:    Wt Readings from Last 10 Encounters:   11/07/17 65.8 kg (145 lb 1 oz)   11/03/17 64.9 kg (143 lb)   10/26/17 65 kg (143 lb 6.4 oz)   10/11/17 64.4 kg (142 lb)   09/13/17 62.6 kg (138 lb)   09/11/17 60.8 kg (134 lb)   08/10/17 61.2 kg (135 lb)   07/27/17 59.9 kg (132 lb 1.6 oz)   07/07/17 59 kg (130 lb)   06/19/17 58.5 kg (129 lb)                   /72  Pulse 83  Ht 1.486 m (4' 10.5\")  Wt 65.8 kg (145 lb 1 oz)  LMP 03/28/2017 (Within Weeks)  BMI 29.8 kg/m2    Physical Exam  General Appearance:  Short stature, pleasant, no distress noted  Eyes:  conjutivae and extra-ocular motions are normal.                                    pupils round and reactive to light, no lid lag, no stare    HEENT:   oropharynx clear and moist, no JVD, no bruits      no thyromegaly, no palpable nodules   Cardiovascular:  regular rhythm, no murmurs, distal pulse palpable, no edema  Respiratory:       chest clear, no rales, no rhonchi   Gastrointestinal: abdomen pregnant   Musculoskeletal: normal tone and " strength  Psychiatric: affect and judgment normal  Skin: Acanthosis nigricans at the flexural areas; ? pytiriasis lesions on the back (skin is itchy, per pt)  Neurological: reflexes normal and symmetric, no resting tremor     Lab Results  I reviewed prior lab results documented in Epic.  Lab Results   Component Value Date    A1C 5.6 10/18/2017    A1C 5.7 06/19/2017    A1C 6.0 09/02/2015       Hemoglobin   Date Value Ref Range Status   10/09/2017 13.1 11.7 - 15.7 g/dL Final     Hematocrit   Date Value Ref Range Status   10/09/2017 38.1 35.0 - 47.0 % Final     No results found for: CHOL, CHOLHDLRATIO, HDL, LDL, VLDL, TRIG  No results found for: MICROL  TSH   Date Value Ref Range Status   09/02/2015 3.88 0.40 - 4.00 mU/L Final         Last Basic Metabolic Panel:    Sodium   Date Value Ref Range Status   10/09/2017 136 133 - 144 mmol/L Final     Potassium   Date Value Ref Range Status   10/09/2017 3.6 3.4 - 5.3 mmol/L Final     Chloride   Date Value Ref Range Status   10/09/2017 104 94 - 109 mmol/L Final     Calcium   Date Value Ref Range Status   10/09/2017 8.5 8.5 - 10.1 mg/dL Final     Carbon Dioxide   Date Value Ref Range Status   10/09/2017 25 20 - 32 mmol/L Final     Urea Nitrogen   Date Value Ref Range Status   10/09/2017 11 7 - 30 mg/dL Final     Creatinine   Date Value Ref Range Status   10/09/2017 0.57 0.52 - 1.04 mg/dL Final     GFR Estimate   Date Value Ref Range Status   10/09/2017 >90 >60 mL/min/1.7m2 Final     Comment:     Non  GFR Calc     Glucose   Date Value Ref Range Status   10/09/2017 90 70 - 99 mg/dL Final     Comment:     Fasting specimen       AST   Date Value Ref Range Status   10/09/2017 12 0 - 45 U/L Final     ALT   Date Value Ref Range Status   10/09/2017 21 0 - 50 U/L Final     Albumin   Date Value Ref Range Status   10/09/2017 2.8 (L) 3.4 - 5.0 g/dL Final       Assessment     Gestational diabetes, in a 37-year-old female, with a family history of type 2 diabetes.    The  blood sugar numbers have gradually improved with treatment with NPH insulin.  More recently, her fasting blood sugar has been above the target of 95.  I recommended to change the dose of NPH to 6 U in the morning and 8 U before dinner. I also advised her to try to, occasionally, check her pre-meal blood sugar.    I counseled the patient on diabetes complications on pregnancy and delivery, pregnancy BG targets:  Fasting blood glucose concentration ?95 mg/dL  One-hour postprandial blood glucose concentration ?140 mg/dL  Two-hour postprandial glucose concentration ?120 mg/dL  A1C ?6 percent    She is going to have the blood sugar numbers sent to us in a few days.  RTC 1 month, either with me or the diabetes educator.     No orders of the defined types were placed in this encounter.             Again, thank you for allowing me to participate in the care of your patient.      Sincerely,    Autumn Urban MD

## 2017-11-07 NOTE — PROGRESS NOTES
The patient is referred for evaluation of gestational diabetes.    Reji Lockwood is a 37 year old [facility] female diagnosed with gestational diabetes in her 3rd trimester.  She is now 30 weeks pregnant.  The estimated delivery date is 1/15/18.  This is her 2nd pregnancy.  The 1st pregnancy occurred in  and was not complicated by diabetes. The patient delivered prematurely, at 24 weeks.  The baby was born with a weight of 1 pound and 5 ounces.  Hemoglobin A1c was 5.6%, on 10/18/17.   She was started on NPH by the diabetes educator and currently, she has been taking 6 U NPH in the morning (9-9:30 AM) and 6 U NPH at 6-7 PM, before dinner.    Today, the patient denies any specific complaints, with the exception of some difficulty sleeping at night, noticed in the 3rd trimester.    She has been checking her blood sugars in the morning and one hour after eating.  The glucometer reveals that she checks her blood sugar 4 times daily.  Average blood glucose in the last month is 122 with a standard deviation of 26 and a range variable between 82 and 196. Since being on 6 U NPH twice daily, her postprandial blood glucose is at target.  However, the morning blood sugar has been variable between 93 and 100.    She denies experiencing hypoglycemic episodes.  In a regular day, she has 3 meals and she rarely snacks, mainly on fruits (not at bedtime).    Her mother was diagnosed with diabetes in her 50s and she used to take insulin. She  at age 60, of MI.     Past Medical History   Past Medical History:   Diagnosis Date     Chronic viral hepatitis B without delta agent and without coma (H)      Past Surgical Hystory   Past Surgical History:   Procedure Laterality Date     CERCLAGE CERVICAL N/A 2017    Procedure: CERCLAGE CERVICAL;  Cervical Cerclage ;  Surgeon: Ez Erazo MD;  Location:  L+D     Current Medications   Prescription Medications as of 2017             tenofovir (VIREAD) 300 MG tablet Take 1  "tablet (300 mg) by mouth daily    insulin syringe-needle U-100 31G X 15/64\" 0.3 ML Use 2 syringes daily or as directed.    insulin isophane human (HUMULIN N PEN) 100 UNIT/ML injection Take 5 units before breakfast  And 5 units before dinner    insulin pen needle 31G X 5 MM Use 2 pen needles daily or as directed.    hydroxyprogesterone caproate in oil 250 mg/mL (LYDIA) intramuscular injection Inject 1 mL (250 mg) into the muscle every 7 days    Prenatal Vit-Fe Fumarate-FA (PRENATAL MULTIVITAMIN  PLUS IRON) 27-0.8 MG TABS per tablet Take 1 tablet by mouth daily    insulin NPH (NOVOLIN N VIAL) 100 UNIT/ML injection 5 units before breakfast and 5 units before dinner    blood glucose monitoring (ONE TOUCH DELICA) lancets Use to test blood sugar 4 times daily or as directed.    blood glucose monitoring (ONE TOUCH ULTRA 2) meter device kit Use to test blood sugar 4 times daily or as directed.    blood glucose monitoring (NO BRAND SPECIFIED) test strip Use to test blood sugars 4 times daily or as directed    blood glucose monitoring (CARLOS ENRIQUE MICROLET) lancets Use to test blood sugar 4 times daily or as directed.    acetone, Urine, test STRP Use one strip daily to check for urine ketones    acetaminophen (TYLENOL) 325 MG tablet Take 2 tablets (650 mg) by mouth every 4 hours as needed for mild pain or fever    docusate sodium (COLACE) 100 MG tablet Take 100 mg by mouth daily    ondansetron (ZOFRAN ODT) 4 MG ODT tab Take 1-2 tablets (4-8 mg) by mouth every 8 hours as needed for nausea        Family History   As above     Social History  Emigrated from [facility] to [place] in [place].    with 2 children. She denies smoking, drinking alcohol or using illicit drugs. Occupation:  line.     Review of Systems   Systemic:              No significant fatigue   Eye:                       No eye symptoms   Coco-Laryngeal:      No coco-laryngeal symptoms, no dysphagia, no voice hoarseness, no cough      Breast:                   " "No breast symptoms  Cardiovascular:     No cardiovascular symptoms, no CP or palpitations   Pulmonary:            No pulmonary symptoms, no cough or SOB    Gastrointestinal:    No gastrointestinal symptoms, no diarrhea or constipation   Genitourinary:        Urinates 3-4 times a night   Endocrine:             No endocrine symptoms, no heat or cold intolerance   Neurological:          No neurological symptoms, no headaches, no tremor, no dizziness     Musculoskeletal:    Mild low back pain   Skin:                       No skin symptoms   Psychological:       No psychological symptoms                Vital Signs     Previous Weights:    Wt Readings from Last 10 Encounters:   11/07/17 65.8 kg (145 lb 1 oz)   11/03/17 64.9 kg (143 lb)   10/26/17 65 kg (143 lb 6.4 oz)   10/11/17 64.4 kg (142 lb)   09/13/17 62.6 kg (138 lb)   09/11/17 60.8 kg (134 lb)   08/10/17 61.2 kg (135 lb)   07/27/17 59.9 kg (132 lb 1.6 oz)   07/07/17 59 kg (130 lb)   06/19/17 58.5 kg (129 lb)                   /72  Pulse 83  Ht 1.486 m (4' 10.5\")  Wt 65.8 kg (145 lb 1 oz)  LMP 03/28/2017 (Within Weeks)  BMI 29.8 kg/m2    Physical Exam  General Appearance:  Short stature, pleasant, no distress noted  Eyes:  conjutivae and extra-ocular motions are normal.                                    pupils round and reactive to light, no lid lag, no stare    HEENT:   oropharynx clear and moist, no JVD, no bruits      no thyromegaly, no palpable nodules   Cardiovascular:  regular rhythm, no murmurs, distal pulse palpable, no edema  Respiratory:       chest clear, no rales, no rhonchi   Gastrointestinal: abdomen pregnant   Musculoskeletal: normal tone and strength  Psychiatric: affect and judgment normal  Skin: Acanthosis nigricans at the flexural areas; ? pytiriasis lesions on the back (skin is itchy, per pt)  Neurological: reflexes normal and symmetric, no resting tremor     Lab Results  I reviewed prior lab results documented in Epic.  Lab Results "   Component Value Date    A1C 5.6 10/18/2017    A1C 5.7 06/19/2017    A1C 6.0 09/02/2015       Hemoglobin   Date Value Ref Range Status   10/09/2017 13.1 11.7 - 15.7 g/dL Final     Hematocrit   Date Value Ref Range Status   10/09/2017 38.1 35.0 - 47.0 % Final     No results found for: CHOL, CHOLHDLRATIO, HDL, LDL, VLDL, TRIG  No results found for: MICROL  TSH   Date Value Ref Range Status   09/02/2015 3.88 0.40 - 4.00 mU/L Final         Last Basic Metabolic Panel:    Sodium   Date Value Ref Range Status   10/09/2017 136 133 - 144 mmol/L Final     Potassium   Date Value Ref Range Status   10/09/2017 3.6 3.4 - 5.3 mmol/L Final     Chloride   Date Value Ref Range Status   10/09/2017 104 94 - 109 mmol/L Final     Calcium   Date Value Ref Range Status   10/09/2017 8.5 8.5 - 10.1 mg/dL Final     Carbon Dioxide   Date Value Ref Range Status   10/09/2017 25 20 - 32 mmol/L Final     Urea Nitrogen   Date Value Ref Range Status   10/09/2017 11 7 - 30 mg/dL Final     Creatinine   Date Value Ref Range Status   10/09/2017 0.57 0.52 - 1.04 mg/dL Final     GFR Estimate   Date Value Ref Range Status   10/09/2017 >90 >60 mL/min/1.7m2 Final     Comment:     Non  GFR Calc     Glucose   Date Value Ref Range Status   10/09/2017 90 70 - 99 mg/dL Final     Comment:     Fasting specimen       AST   Date Value Ref Range Status   10/09/2017 12 0 - 45 U/L Final     ALT   Date Value Ref Range Status   10/09/2017 21 0 - 50 U/L Final     Albumin   Date Value Ref Range Status   10/09/2017 2.8 (L) 3.4 - 5.0 g/dL Final       Assessment     Gestational diabetes, in a 37-year-old female, with a family history of type 2 diabetes.    The blood sugar numbers have gradually improved with treatment with NPH insulin.  More recently, her fasting blood sugar has been above the target of 95.  I recommended to change the dose of NPH to 6 U in the morning and 8 U before dinner. I also advised her to try to, occasionally, check her pre-meal blood  sugar.    I counseled the patient on diabetes complications on pregnancy and delivery, pregnancy BG targets:  Fasting blood glucose concentration ?95 mg/dL  One-hour postprandial blood glucose concentration ?140 mg/dL  Two-hour postprandial glucose concentration ?120 mg/dL  A1C ?6 percent    She is going to have the blood sugar numbers sent to us in a few days.  RTC 1 month, either with me or the diabetes educator.     No orders of the defined types were placed in this encounter.

## 2017-11-07 NOTE — MR AVS SNAPSHOT
After Visit Summary   11/7/2017    Reji Lockwood    MRN: [id number]           Patient Information     Date Of Birth          1980        Visit Information        Provider Department      11/7/2017 12:45 PM Autumn Urban MD; [facility]        Care Instructions    Increased the evening dose of NPH to 8 U   Check BG fasting, 1 hr post meals and, once a day, before lunch and before dinner    Send us the BG numbers (or have the meter downloaded in the clinic), in 1-2 weeks       Sending blood sugars to your provider at [place]:  We want to help you with your diabetes management, which often requires frequent adjustments to your therapy. For your convenience, we have several ways to send your blood sugars to your doctor for review.    - Send message directly to your doctor through My Chart.  Please ask the rooming staff if you would like to sign up for My Chart.  This is a fast and confidential way to send your information and communicate directly with your provider.   - Record readings and fax to [phone].  We have a template for you to use for your convenience.  - If you have a Medtronic pump, upload to [employer] and notify your provider of your username and password.   - Stop by the clinic with your meter for download.   - My Chart or call Shavon Luo, Diabetes Educator at [phone]  - Call the clinic and speak to one of the endocrine nurses to relay information on the telephone.  Nadeen Marques or Sneha at [phone].   -    Please call the on-call Endocrinologist at the [facility] for after       hours/weekend needs at [phone] Option #4.    Please note that you do not need to FAST if you are just having an A1C drawn. Please remember to ALWAYS bring your glucose meter with to your appointment. This data is very important for the management of your care.    Thank you!  Your [place] [facility] Team                 Follow-ups after your visit        Follow-up notes from your care team     Return in about 1 month (around 12/7/2017) for CDE.      Your next 10 appointments already scheduled     Nov 10, 2017 11:00 AM CST   Nurse Only with NURSE ONLY WOMENS   [facility] ([facility]    [address]  [facility] [address]   [phone]            Nov 16, 2017  2:45 PM CST   MyChart OB-GYN Established Prenatal with Main Norwood MD   [facility] ([facility]    [address] [place] [address]   [phone]            Nov 17, 2017 11:00 AM CST   Nurse Only with NURSE ONLY WOMENS   [facility] ([facility])    [address]  [facility] [address]   [phone]            Nov 24, 2017 10:00 AM CST   Nurse Only with NURSE ONLY WOMENS   [facility] ([facility])    [address]  [facility] [address]   [phone]            Dec 01, 2017 11:00 AM CST   [facility]   [facility] ([facility]    [address]  [facility] [address]   [phone]            Dec 12, 2017 11:00 AM CST   Return Visit with Mg Cde Provider   [facility]    [address] [facility] [address]   [phone]            Mar 08, 2018 10:40 AM CST   Return Visit with Breana Hood MD   [facility] ([facility])    [address]  [facility] [address]   [phone]              Who to contact     If you have questions or need follow up information about today's clinic visit or your schedule please contact [facility] directly at [phone].  Normal or non-critical lab and imaging results will be communicated to you by Bob[employer], letter or  "phone within 4 business days after the clinic has received the results. If you do not hear from us within 7 days, please contact the clinic through [employer] or phone. If you have a critical or abnormal lab result, we will notify you by phone as soon as possible.  Submit refill requests through [employer] or call your pharmacy and they will forward the refill request to us. Please allow 3 business days for your refill to be completed.          Additional Information About Your Visit        [employer] Information     [employer] is an electronic gateway that provides easy, online access to your medical records. With [employer], you can request a clinic appointment, read your test results, renew a prescription or communicate with your care team.     To sign up for [employer] visit the website at [email or web address]   You will be asked to enter the access code listed below, as well as some personal information. Please follow the directions to create your username and password.     Your access code is: [email or web address]  Expires: 12/10/2017  2:39 PM     Your access code will  in 90 days. If you need help or a new code, please contact your [facility] or call [phone] for assistance.        Care EveryWhere ID     This is your Care EveryWhere ID. This could be used by other organizations to access your [facility] medical records  [id number]        Your Vitals Were     Pulse Height Last Period BMI (Body Mass Index)          83 1.486 m (4' 10.5\") 2017 (Within Weeks) 29.8 kg/m2         Blood Pressure from Last 3 Encounters:   17 102/72   17 105/72   10/27/17 118/68    Weight from Last 3 Encounters:   17 65.8 kg (145 lb 1 oz)   17 64.9 kg (143 lb)   10/26/17 65 kg (143 lb 6.4 oz)              Today, you had the following     No orders found for display         Today's Medication Changes          These changes are accurate as of: 17  1:50 PM.  If you have any " questions, ask your nurse or doctor.               These medicines have changed or have updated prescriptions.        Dose/Directions    * insulin  UNIT/ML injection   Commonly known as:  NovoLIN N VIAL   This may have changed:  Another medication with the same name was changed. Make sure you understand how and when to take each.   Used for:  Gestational diabetes mellitus   Changed by:  Moira Luoy        5 units before breakfast and 5 units before dinner   Quantity:  10 mL   Refills:  3       * insulin isophane human 100 UNIT/ML injection   Commonly known as:  HumuLIN N PEN   This may have changed:  additional instructions   Used for:  Gestational diabetes mellitus   Changed by:  Blue, Shavon        Take 5 units before breakfast  And 5 units before dinner   Quantity:  15 mL   Refills:  1       * Notice:  This list has 2 medication(s) that are the same as other medications prescribed for you. Read the directions carefully, and ask your doctor or other care provider to review them with you.             Primary Care Provider Office Phone # Fax #    Fadia Georgia Serrano [phone]       [address]  [facility] [address]        Equal Access to Services     [facility]: Hadii aad ku hadasho Soomaali, waaxda luqadaha, qaybta kaalmada adeegyada, waxpatsy daily . So [facility] [phone].    ATENCIÓN: Si habla español, tiene a gilliam disposición servicios gratuitos de asistencia lingüística. Llame al [phone].    We comply with applicable federal civil rights laws and [place] laws. We do not discriminate on the basis of race, color, national origin, age, disability, sex, sexual orientation, or gender identity.            Thank you!     Thank you for choosing [facility]  for your care. Our goal is always to provide you with excellent care. Hearing back from our patients is one way we can continue to improve our services. Please take a  few minutes to complete the written survey that you may receive in the mail after your visit with us. Thank you!             Your Updated Medication List - Protect others around you: Learn how to safely use, store and throw away your medicines at www.disposemymeds.org.          This list is accurate as of: 11/7/17  1:50 PM.  Always use your most recent med list.                   Brand Name Dispense Instructions for use Diagnosis    acetaminophen 325 MG tablet    TYLENOL    100 tablet    Take 2 tablets (650 mg) by mouth every 4 hours as needed for mild pain or fever    Cervical insufficiency during pregnancy, antepartum, second trimester       acetone (Urine) test Strp     25 each    Use one strip daily to check for urine ketones    Abnormal maternal glucose tolerance, antepartum       * blood glucose monitoring lancets     100 each    Use to test blood sugar 4 times daily or as directed.    Abnormal maternal glucose tolerance, antepartum       * blood glucose monitoring lancets     100 each    Use to test blood sugar 4 times daily or as directed.    Gestational diabetes mellitus       blood glucose monitoring meter device kit     1 kit    Use to test blood sugar 4 times daily or as directed.    Gestational diabetes mellitus (GDM), antepartum, gestational diabetes method of control unspecified       blood glucose monitoring test strip    no brand specified    200 strip    Use to test blood sugars 4 times daily or as directed    Abnormal maternal glucose tolerance, antepartum       docusate sodium 100 MG tablet    COLACE    60 tablet    Take 100 mg by mouth daily    Cervical insufficiency during pregnancy, antepartum, second trimester       hydroxyprogesterone caproate in oil 250 mg/mL intramuscular injection    LYDIA    5 mL    Inject 1 mL (250 mg) into the muscle every 7 days    Cervical insufficiency during pregnancy, antepartum, second trimester, Supervision of high risk pregnancy, antepartum, second trimester,  "History of  delivery, currently pregnant in second trimester       * insulin  UNIT/ML injection    NovoLIN N VIAL    10 mL    5 units before breakfast and 5 units before dinner    Gestational diabetes mellitus       * insulin isophane human 100 UNIT/ML injection    HumuLIN N PEN    15 mL    Take 5 units before breakfast  And 5 units before dinner    Gestational diabetes mellitus       insulin pen needle 31G X 5 MM     100 each    Use 2 pen needles daily or as directed.    Gestational diabetes mellitus       insulin syringe-needle U-100 31G X 15/64\" 0.3 ML     100 each    Use 2 syringes daily or as directed.    Gestational diabetes mellitus       ondansetron 4 MG ODT tab    ZOFRAN ODT    30 tablet    Take 1-2 tablets (4-8 mg) by mouth every 8 hours as needed for nausea    Nausea/vomiting in pregnancy       prenatal multivitamin plus iron 27-0.8 MG Tabs per tablet     100 tablet    Take 1 tablet by mouth daily    Pregnancy test positive       tenofovir 300 MG tablet    VIREAD    90 tablet    Take 1 tablet (300 mg) by mouth daily    Viral hepatitis B chronic (H)       * Notice:  This list has 4 medication(s) that are the same as other medications prescribed for you. Read the directions carefully, and ask your doctor or other care provider to review them with you.      "

## 2017-11-10 ENCOUNTER — ALLIED HEALTH/NURSE VISIT (OUTPATIENT)
Dept: NURSING | Facility: CLINIC | Age: 37
End: 2017-11-10
Payer: COMMERCIAL

## 2017-11-10 DIAGNOSIS — O09.522 AMA (ADVANCED MATERNAL AGE) MULTIGRAVIDA 35+, SECOND TRIMESTER: ICD-10-CM

## 2017-11-10 DIAGNOSIS — O09.92 SUPERVISION OF HIGH RISK PREGNANCY, ANTEPARTUM, SECOND TRIMESTER: ICD-10-CM

## 2017-11-10 DIAGNOSIS — O34.32 CERVICAL INSUFFICIENCY DURING PREGNANCY, ANTEPARTUM, SECOND TRIMESTER: ICD-10-CM

## 2017-11-10 PROCEDURE — 96372 THER/PROPH/DIAG INJ SC/IM: CPT

## 2017-11-10 PROCEDURE — 99207 ZZC NO CHARGE NURSE ONLY: CPT

## 2017-11-10 NOTE — NURSING NOTE
The following medication was given:     MEDICATION: Lakshmi  ROUTE: IM  SITE: RUQ - Gluteus  DOSE: 250mg/1ml  LOT #: [id number]  :  [employer]  EXPIRATION DATE:  11/2019  NDC#: 71986-899-01  Patient tolerated the injections well.  Supply taken from clinic stock.  She denied contractions and stated she is experiencing less localized itch with the weekly injections.  Today she didn't have an  but was able to communicate with staff/RN well.  Vianey Perry RN

## 2017-11-10 NOTE — MR AVS SNAPSHOT
After Visit Summary   11/10/2017    Reji Lockwood    MRN: [id number]           Patient Information     Date Of Birth          1980        Visit Information        Provider Department      11/10/2017 10:45 AM PHONE, ; NURSE ONLY [facility]        Today's Diagnoses     Cervical insufficiency during pregnancy, antepartum, second trimester        Supervision of high risk pregnancy, antepartum, second trimester        AMA (advanced maternal age) multigravida 35+, second trimester           Follow-ups after your visit        Your next 10 appointments already scheduled     Nov 16, 2017  2:45 PM CST   ESTABLISHED PRENATAL with Main Norwood MD   [facility]    [address] [place] [address]   [phone]            Nov 17, 2017 11:00 AM CST   Nurse Only with NURSE ONLY [facility] ([facility]    [address] [place] [address] [address] [address]   [phone]            Nov 17, 2017 11:30 AM CST   LAB with LAB FIRST FLOOR [facility] ([facility])    [address] [place] [address]   [phone]           Please do not eat 10-12 hours before your appointment if you are coming in fasting for labs on lipids, cholesterol, or glucose (sugar). This does not apply to pregnant women. Water, hot tea and black coffee (with nothing added) are okay. Do not drink other fluids, diet soda or chew gum.            Nov 24, 2017 10:00 AM CST   [facility]   [facility]    [address] [place] [address] [address] [facility] [address]  [facility] [address]   [phone]            Dec 01, 2017 11:00 AM CST   N[facility] Only with [facility]URSE [facility]   [facility] ([facility]    [address] [place] [address] [address] [address]  "  [phone]            Dec 12, 2017 11:00 AM CST   Return Visit with Mg Cde Provider   [facility]    [address] [place] [address] [phone]            Mar 08, 2018 10:40 AM CST   Return Visit with Breana Hood MD   [facility] ([facility]    [address] [place] [address] [phone]              Who to contact     If you have questions or need follow up information about today's clinic visit or your schedule please contact [facility] directly at [phone].  Normal or non-critical lab and imaging results will be communicated to you by [employer], letter or phone within 4 business days after the clinic has received the results. If you do not hear from us within 7 days, please contact the clinic through [employer] or phone. If you have a critical or abnormal lab result, we will notify you by phone as soon as possible.  Submit refill requests through [employer] or call your pharmacy and they will forward the refill request to us. Please allow 3 business days for your refill to be completed.          Additional Information About Your Visit        [employer] Information     [employer] lets you send messages to your doctor, view your test results, renew your prescriptions, schedule appointments and more. To sign up, go to [email or web address] . Click on \"Log in\" on the left side of the screen, which will take you to the Welcome page. Then click on \"Sign up Now\" on the right side of the page.     You will be asked to enter the access code listed below, as well as some personal information. Please follow the directions to create your username and password.     Your access code is: [email or web address]  Expires: 12/10/2017  2:39 PM     Your access code will  in 90 days. If you need help or a new code, please call your [facility] or [phone].        Care EveryWhere ID     This is " your Care EveryWhere ID. This could be used by other organizations to access your [facility] medical records  [id number]        Your Vitals Were     Last Period                   03/28/2017 (Within Weeks)            Blood Pressure from Last 3 Encounters:   11/07/17 102/72   11/03/17 105/72   10/27/17 118/68    Weight from Last 3 Encounters:   11/07/17 65.8 kg (145 lb 1 oz)   11/03/17 64.9 kg (143 lb)   10/26/17 65 kg (143 lb 6.4 oz)              We Performed the Following     HYDROXYPROGESTERONE      INJECTION INTRAMUSCULAR OR SUB-Q          Today's Medication Changes          These changes are accurate as of: 11/10/17 11:33 AM.  If you have any questions, ask your nurse or doctor.               These medicines have changed or have updated prescriptions.        Dose/Directions    * insulin  UNIT/ML injection   Commonly known as:  NovoLIN N VIAL   This may have changed:  Another medication with the same name was changed. Make sure you understand how and when to take each.   Used for:  Gestational diabetes mellitus        5 units before breakfast and 5 units before dinner   Quantity:  10 mL   Refills:  3       * insulin isophane human 100 UNIT/ML injection   Commonly known as:  HumuLIN N PEN   This may have changed:  additional instructions   Used for:  Gestational diabetes mellitus        Take 5 units before breakfast  And 5 units before dinner   Quantity:  15 mL   Refills:  1       * Notice:  This list has 2 medication(s) that are the same as other medications prescribed for you. Read the directions carefully, and ask your doctor or other care provider to review them with you.             Primary Care Provider Office Phone # Fax #    Fadia Serrano [phone] [address]  [facility] [address]        Equal Access to Services     TERRENCE MCCORMICK AH: Ventura Giang, tam savage, ana price  lapaulina salvador. So [facility] [phone].    ATENCIÓN: Si habla alfonso, tiene a gilliam disposición servicios gratuitos de asistencia lingüística. Deborah al [phone].    We comply with applicable federal civil rights laws and [place] laws. We do not discriminate on the basis of race, color, national origin, age, disability, sex, sexual orientation, or gender identity.            Thank you!     Thank you for choosing [facility]  for your care. Our goal is always to provide you with excellent care. Hearing back from our patients is one way we can continue to improve our services. Please take a few minutes to complete the written survey that you may receive in the mail after your visit with us. Thank you!             Your Updated Medication List - Protect others around you: Learn how to safely use, store and throw away your medicines at www.disposemymeds.org.          This list is accurate as of: 11/10/17 11:33 AM.  Always use your most recent med list.                   Brand Name Dispense Instructions for use Diagnosis    acetaminophen 325 MG tablet    TYLENOL    100 tablet    Take 2 tablets (650 mg) by mouth every 4 hours as needed for mild pain or fever    Cervical insufficiency during pregnancy, antepartum, second trimester       acetone (Urine) test Strp     25 each    Use one strip daily to check for urine ketones    Abnormal maternal glucose tolerance, antepartum       * blood glucose monitoring lancets     100 each    Use to test blood sugar 4 times daily or as directed.    Abnormal maternal glucose tolerance, antepartum       * blood glucose monitoring lancets     100 each    Use to test blood sugar 4 times daily or as directed.    Gestational diabetes mellitus       blood glucose monitoring meter device kit     1 kit    Use to test blood sugar 4 times daily or as directed.    Gestational diabetes mellitus (GDM), antepartum, gestational diabetes method of control unspecified       blood glucose  "monitoring test strip    no brand specified    200 strip    Use to test blood sugars 4 times daily or as directed    Abnormal maternal glucose tolerance, antepartum       docusate sodium 100 MG tablet    COLACE    60 tablet    Take 100 mg by mouth daily    Cervical insufficiency during pregnancy, antepartum, second trimester       hydroxyprogesterone caproate in oil 250 mg/mL intramuscular injection    LYDIA    5 mL    Inject 1 mL (250 mg) into the muscle every 7 days    Cervical insufficiency during pregnancy, antepartum, second trimester, Supervision of high risk pregnancy, antepartum, second trimester, History of  delivery, currently pregnant in second trimester       * insulin  UNIT/ML injection    NovoLIN N VIAL    10 mL    5 units before breakfast and 5 units before dinner    Gestational diabetes mellitus       * insulin isophane human 100 UNIT/ML injection    HumuLIN N PEN    15 mL    Take 5 units before breakfast  And 5 units before dinner    Gestational diabetes mellitus       insulin pen needle 31G X 5 MM     100 each    Use 2 pen needles daily or as directed.    Gestational diabetes mellitus       insulin syringe-needle U-100 31G X 15/64\" 0.3 ML     100 each    Use 2 syringes daily or as directed.    Gestational diabetes mellitus       ondansetron 4 MG ODT tab    ZOFRAN ODT    30 tablet    Take 1-2 tablets (4-8 mg) by mouth every 8 hours as needed for nausea    Nausea/vomiting in pregnancy       prenatal multivitamin plus iron 27-0.8 MG Tabs per tablet     100 tablet    Take 1 tablet by mouth daily    Pregnancy test positive       tenofovir 300 MG tablet    VIREAD    90 tablet    Take 1 tablet (300 mg) by mouth daily    Viral hepatitis B chronic (H)       * Notice:  This list has 4 medication(s) that are the same as other medications prescribed for you. Read the directions carefully, and ask your doctor or other care provider to review them with you.      "

## 2017-11-11 PROBLEM — O24.419 GESTATIONAL DIABETES MELLITUS (GDM), ANTEPARTUM, GESTATIONAL DIABETES METHOD OF CONTROL UNSPECIFIED: Status: RESOLVED | Noted: 2017-10-11 | Resolved: 2017-11-11

## 2017-11-11 PROBLEM — O24.414 GESTATIONAL DIABETES REQUIRING INSULIN: Status: ACTIVE | Noted: 2017-11-11

## 2017-11-17 ENCOUNTER — TELEPHONE (OUTPATIENT)
Dept: ENDOCRINOLOGY | Facility: CLINIC | Age: 37
End: 2017-11-17

## 2017-11-17 ENCOUNTER — PRENATAL OFFICE VISIT (OUTPATIENT)
Dept: OBGYN | Facility: CLINIC | Age: 37
End: 2017-11-17
Payer: COMMERCIAL

## 2017-11-17 ENCOUNTER — ALLIED HEALTH/NURSE VISIT (OUTPATIENT)
Dept: NURSING | Facility: CLINIC | Age: 37
End: 2017-11-17
Payer: COMMERCIAL

## 2017-11-17 VITALS
DIASTOLIC BLOOD PRESSURE: 68 MMHG | TEMPERATURE: 97.7 F | SYSTOLIC BLOOD PRESSURE: 103 MMHG | HEART RATE: 74 BPM | OXYGEN SATURATION: 98 %

## 2017-11-17 VITALS
WEIGHT: 143 LBS | DIASTOLIC BLOOD PRESSURE: 70 MMHG | BODY MASS INDEX: 29.38 KG/M2 | SYSTOLIC BLOOD PRESSURE: 103 MMHG | TEMPERATURE: 96.7 F | HEART RATE: 95 BPM

## 2017-11-17 DIAGNOSIS — O34.30 CERVICAL INSUFFICIENCY DURING PREGNANCY, ANTEPARTUM: ICD-10-CM

## 2017-11-17 DIAGNOSIS — O09.522 AMA (ADVANCED MATERNAL AGE) MULTIGRAVIDA 35+, SECOND TRIMESTER: ICD-10-CM

## 2017-11-17 DIAGNOSIS — O09.92 SUPERVISION OF HIGH RISK PREGNANCY, ANTEPARTUM, SECOND TRIMESTER: ICD-10-CM

## 2017-11-17 DIAGNOSIS — O09.90 SUPERVISION OF HIGH RISK PREGNANCY, ANTEPARTUM: ICD-10-CM

## 2017-11-17 DIAGNOSIS — O34.32 CERVICAL INSUFFICIENCY DURING PREGNANCY, ANTEPARTUM, SECOND TRIMESTER: ICD-10-CM

## 2017-11-17 DIAGNOSIS — B18.1 VIRAL HEPATITIS B CHRONIC (H): ICD-10-CM

## 2017-11-17 LAB
ALBUMIN SERPL-MCNC: 2.8 G/DL (ref 3.4–5)
ALP SERPL-CCNC: 109 U/L (ref 40–150)
ALT SERPL W P-5'-P-CCNC: 20 U/L (ref 0–50)
ANION GAP SERPL CALCULATED.3IONS-SCNC: 8 MMOL/L (ref 3–14)
AST SERPL W P-5'-P-CCNC: 14 U/L (ref 0–45)
BILIRUB DIRECT SERPL-MCNC: <0.1 MG/DL (ref 0–0.2)
BILIRUB SERPL-MCNC: 0.4 MG/DL (ref 0.2–1.3)
BUN SERPL-MCNC: 10 MG/DL (ref 7–30)
CALCIUM SERPL-MCNC: 8.6 MG/DL (ref 8.5–10.1)
CHLORIDE SERPL-SCNC: 105 MMOL/L (ref 94–109)
CO2 SERPL-SCNC: 25 MMOL/L (ref 20–32)
CREAT SERPL-MCNC: 0.55 MG/DL (ref 0.52–1.04)
ERYTHROCYTE [DISTWIDTH] IN BLOOD BY AUTOMATED COUNT: 13.3 % (ref 10–15)
GFR SERPL CREATININE-BSD FRML MDRD: >90 ML/MIN/1.7M2
GLUCOSE SERPL-MCNC: 83 MG/DL (ref 70–99)
HCT VFR BLD AUTO: 37.6 % (ref 35–47)
HGB BLD-MCNC: 12.8 G/DL (ref 11.7–15.7)
INR PPP: 0.88 (ref 0.86–1.14)
MCH RBC QN AUTO: 30.8 PG (ref 26.5–33)
MCHC RBC AUTO-ENTMCNC: 34 G/DL (ref 31.5–36.5)
MCV RBC AUTO: 90 FL (ref 78–100)
PLATELET # BLD AUTO: 162 10E9/L (ref 150–450)
POTASSIUM SERPL-SCNC: 3.6 MMOL/L (ref 3.4–5.3)
PROT SERPL-MCNC: 6.5 G/DL (ref 6.8–8.8)
RBC # BLD AUTO: 4.16 10E12/L (ref 3.8–5.2)
SODIUM SERPL-SCNC: 138 MMOL/L (ref 133–144)
WBC # BLD AUTO: 8.8 10E9/L (ref 4–11)

## 2017-11-17 PROCEDURE — 80048 BASIC METABOLIC PNL TOTAL CA: CPT | Performed by: INTERNAL MEDICINE

## 2017-11-17 PROCEDURE — 85610 PROTHROMBIN TIME: CPT | Performed by: INTERNAL MEDICINE

## 2017-11-17 PROCEDURE — 80076 HEPATIC FUNCTION PANEL: CPT | Performed by: INTERNAL MEDICINE

## 2017-11-17 PROCEDURE — 99207 ZZC PRENATAL VISIT: CPT | Performed by: OBSTETRICS & GYNECOLOGY

## 2017-11-17 PROCEDURE — 87517 HEPATITIS B DNA QUANT: CPT | Performed by: INTERNAL MEDICINE

## 2017-11-17 PROCEDURE — 36415 COLL VENOUS BLD VENIPUNCTURE: CPT | Performed by: INTERNAL MEDICINE

## 2017-11-17 PROCEDURE — 99207 ZZC NO CHARGE NURSE ONLY: CPT

## 2017-11-17 PROCEDURE — 96372 THER/PROPH/DIAG INJ SC/IM: CPT

## 2017-11-17 PROCEDURE — 85027 COMPLETE CBC AUTOMATED: CPT | Performed by: INTERNAL MEDICINE

## 2017-11-17 NOTE — TELEPHONE ENCOUNTER
Gdm patient - oscar 10/15/18 - taking nph insulin with breakfast and dinner. Checking bg 4-5 times a day; fasting, 1 hr post meal and occasionally pre-meal. Readings are as follows:  11/17: 97  11/16: 92/210/116/167 (*210 bg - ate cake)  11/15: 86/141/184/133 (*184 bg - ate cake)  11/14: 86/185/103/118 (*185 bg - ate cake)  11/13: 91/136/131/168  11/12: 98/132/116/111  11/11: 87/105/84/179  Pt advised to make the following changes: increase am dose nph from 6 to 7 units and pm dose from 8 to 9 units. Pt advised I will mingo her in one week for a bg update. Pt verbalized understanding.    Shavon Luo, RN, BSN, CDE   [facility]

## 2017-11-17 NOTE — NURSING NOTE
"Chief Complaint   Patient presents with     Prenatal Care     OBV 31w4d       Initial /70 (BP Location: Left arm, Patient Position: Chair, Cuff Size: Adult Regular)  Pulse 95  Temp 96.7  F (35.9  C) (Oral)  Wt 143 lb (64.9 kg)  LMP 03/28/2017 (Within Weeks)  Breastfeeding? No  BMI 29.38 kg/m2 Estimated body mass index is 29.38 kg/(m^2) as calculated from the following:    Height as of 11/7/17: 4' 10.5\" (1.486 m).    Weight as of this encounter: 143 lb (64.9 kg).  Medication Reconciliation: complete   Kezia Crump CMA      "

## 2017-11-17 NOTE — PATIENT INSTRUCTIONS
If you have any questions regarding your visit, Please contact your care team.     [facility]: [phone]    [facility] CLINIC HOURS TELEPHONE NUMBER   ANN Zaragoza-    Gaby Penaloza-NAYA Mast-Medical Assistant   Monday-[place]  8:00a.m-4:45 p.m  Wednesday-[place] 8:00a.m-4:45 p.m.  Thursday-[place]  8:00a.m-4:45 p.m.  Friday-[place]  8:00a.m-4:45 p.m. [facility]  [address] [place] [address]  [phone] ask [facility]  [phone] Fax  Imaging Fyndlzcdit-[phone]    [facility]  [address] [place] [address] [place] [address]  [phone]    [facility]  [address] [place] [address]  [phone] [facility]  [phone] Fax  Imaging [phone]     Urgent Care locations:    [place]        [place] Monday-Friday  5 pm - 9 pm  Saturday and Sunday   9 am - 5 pm    Monday-Friday   11 am - 9 pm  Saturday and Sunday   9 am - 5 pm   [phone]       If you need a medication refill, please contact your pharmacy. Please allow 3 business days for your refill to be completed.  As always, Thank you for trusting us with your healthcare needs!

## 2017-11-17 NOTE — MR AVS SNAPSHOT
After Visit Summary   11/17/2017    Reji Lockwood    MRN: [id number]           Patient Information     Date Of Birth          1980        Visit Information        Provider Department      11/17/2017 3:00 PM aMin Norwood MD; [employer] [facility]        Today's Diagnoses     Cervical insufficiency during pregnancy, antepartum        Supervision of high risk pregnancy, antepartum        AMA (advanced maternal age) multigravida 35+          Care Instructions                                                        If you have any questions regarding your visit, Please contact your care team.     [facility]: [phone]    [facility] CLINIC HOURS TELEPHONE NUMBER   Main Norwood M.D.      Cristal-    Gaby Penaloza-NAYA Mast-Medical Assistant   Monday-[place]  8:00a.m-4:45 p.m  Wednesday-[place] 8:00a.m-4:45 p.m.  Thursday-[place]  8:00a.m-4:45 p.m.  Friday-[place]  8:00a.m-4:45 p.m. [facility]  [address] [place] [address]  [phone] ask for [facility]  [phone] Fax  Imaging Mcxzgviari-[phone]    [facility] Labor [facility]  [address] [place] [address] [place] [address]  [phone]    [facility]  [address] [facility] [place] [address] [phone] ask [facility]  [phone] Fax  Imaging Fnpsjzybxl-[phone]     Urgent Care locations:    [facility] Monday-Friday  5 pm - 9 pm  Saturday and Sunday   9 am - 5 pm    Monday-Friday   11 am - 9 pm  Saturday and Sunday   9 am - 5 pm   [phone]       If you need a medication refill, please contact your pharmacy. Please allow 3 business days for your refill to be completed.  As always, Thank you for trusting us with your healthcare needs!            Follow-ups after your visit        Your next 10 appointments already scheduled     Nov 22,  2017 10:00 AM CST   Nurse Only with NURSE ONLY WOMENS   [facility] ([facility])    [address]  [facility] [address]   [phone]            Nov 27, 2017  4:15 PM CST   Telephone Visit with Mg Cde Provider   [facility] ([facility])    [address] [facility] [address]   [phone]           Note: this is not an onsite visit; there is no need to come to the facility.            Nov 29, 2017 10:00 AM CST   ESTABLISHED PRENATAL with Main Norwood MD   [facility] ([facility])    [address] [facility] [address]   [phone]            Dec 01, 2017 11:00 AM CST   Nurse Only with NURSE ONLY WOMENS   [facility] ([facility])    [address]  [facility] [address]   [phone]            Dec 12, 2017 11:00 AM CST   Return Visit with Mg Cde Provider   [facility] ([facility])    [address] [facility] [address]   [phone]            Mar 08, 2018 10:40 AM CST   Return Visit with Breana Hood MD   [facility] ([facility])    [address] [facility] [address]   [phone]              Who to contact     If you have questions or need follow up information about today's clinic visit or your schedule please contact [facility] directly at [phone].  Normal or non-critical lab and imaging results will be communicated to you by MyChart, letter or phone within 4 business days after the clinic has received the results. If you do not hear from us within 7 days, please contact the clinic through MyChart or phone. If you have a critical or abnormal lab result, we will notify you by phone as soon as possible.  Submit refill requests through Ex[employer]re or call your pharmacy and they  "will forward the refill request to us. Please allow 3 business days for your refill to be completed.          Additional Information About Your Visit        Lun[employer]on Information     Rated People lets you send messages to your doctor, view your test results, renew your prescriptions, schedule appointments and more. To sign up, go to [email or web address] . Click on \"Log in\" on the left side of the screen, which will take you to the Welcome page. Then click on \"Sign up Now\" on the right side of the page.     You will be asked to enter the access code listed below, as well as some personal information. Please follow the directions to create your username and password.     Your access code is: [email or web address]  Expires: 12/10/2017  2:39 PM     Your access code will  in 90 days. If you need help or a new code, please call your [place] clinic or [phone].        Care EveryWhere ID     This is your Care EveryWhere ID. This could be used by other organizations to access your [place] medical records  [id number]        Your Vitals Were     Pulse Temperature Last Period Breastfeeding? BMI (Body Mass Index)       95 96.7  F (35.9  C) (Oral) 2017 (Within Weeks) No 29.38 kg/m2        Blood Pressure from Last 3 Encounters:   17 103/70   17 103/68   17 102/72    Weight from Last 3 Encounters:   17 143 lb (64.9 kg)   17 145 lb 1 oz (65.8 kg)   17 143 lb (64.9 kg)              Today, you had the following     No orders found for display         Today's Medication Changes          These changes are accurate as of: 17  3:03 PM.  If you have any questions, ask your nurse or doctor.               These medicines have changed or have updated prescriptions.        Dose/Directions    * insulin  UNIT/ML injection   Commonly known as:  NovoLIN N VIAL   This may have changed:  Another medication with the same name was changed. Make sure you understand how and when to take each. "   Used for:  Gestational diabetes mellitus   Changed by:  Shavon Luo        5 units before breakfast and 5 units before dinner   Quantity:  10 mL   Refills:  3       * insulin isophane human 100 UNIT/ML injection   Commonly known as:  HumuLIN N PEN   This may have changed:  additional instructions   Used for:  Gestational diabetes mellitus   Changed by:  Shavon Luo        Take 5 units before breakfast  And 5 units before dinner   Quantity:  15 mL   Refills:  1       * Notice:  This list has 2 medication(s) that are the same as other medications prescribed for you. Read the directions carefully, and ask your doctor or other care provider to review them with you.             Primary Care Provider Office Phone # Fax #    Fadia Georgia Serrano [phone]       [address]  [facility] [address]        Equal Access to Services     TERRENCE MCCORMICK : Hadii laurie mark hadasho Soomaali, waaxda luqadaha, qaybta kaalmada adeegyada, waxay bekain hayyanira daily . So [facility] [phone].    ATENCIÓN: Si habla español, tiene a gilliam disposición servicios gratuitos de asistencia lingüística. Deborah al [phone].    We comply with applicable federal civil rights laws and [place] laws. We do not discriminate on the basis of race, color, national origin, age, disability, sex, sexual orientation, or gender identity.            Thank you!     Thank you for choosing [facility]  for your care. Our goal is always to provide you with excellent care. Hearing back from our patients is one way we can continue to improve our services. Please take a few minutes to complete the written survey that you may receive in the mail after your visit with us. Thank you!             Your Updated Medication List - Protect others around you: Learn how to safely use, store and throw away your medicines at www.disposemymeds.org.          This list is accurate as of: 11/17/17  3:03 PM.  Always use  your most recent med list.                   Brand Name Dispense Instructions for use Diagnosis    acetaminophen 325 MG tablet    TYLENOL    100 tablet    Take 2 tablets (650 mg) by mouth every 4 hours as needed for mild pain or fever    Cervical insufficiency during pregnancy, antepartum, second trimester       acetone (Urine) test Strp     25 each    Use one strip daily to check for urine ketones    Abnormal maternal glucose tolerance, antepartum       * blood glucose monitoring lancets     100 each    Use to test blood sugar 4 times daily or as directed.    Abnormal maternal glucose tolerance, antepartum       * blood glucose monitoring lancets     100 each    Use to test blood sugar 4 times daily or as directed.    Gestational diabetes mellitus       blood glucose monitoring meter device kit     1 kit    Use to test blood sugar 4 times daily or as directed.    Gestational diabetes mellitus (GDM), antepartum, gestational diabetes method of control unspecified       blood glucose monitoring test strip    no brand specified    200 strip    Use to test blood sugars 4 times daily or as directed    Abnormal maternal glucose tolerance, antepartum       docusate sodium 100 MG tablet    COLACE    60 tablet    Take 100 mg by mouth daily    Cervical insufficiency during pregnancy, antepartum, second trimester       hydroxyprogesterone caproate in oil 250 mg/mL intramuscular injection    LYDIA    5 mL    Inject 1 mL (250 mg) into the muscle every 7 days    Cervical insufficiency during pregnancy, antepartum, second trimester, Supervision of high risk pregnancy, antepartum, second trimester, History of  delivery, currently pregnant in second trimester       * insulin  UNIT/ML injection    NovoLIN N VIAL    10 mL    5 units before breakfast and 5 units before dinner    Gestational diabetes mellitus       * insulin isophane human 100 UNIT/ML injection    HumuLIN N PEN    15 mL    Take 5 units before breakfast   "And 5 units before dinner    Gestational diabetes mellitus       insulin pen needle 31G X 5 MM     100 each    Use 2 pen needles daily or as directed.    Gestational diabetes mellitus       insulin syringe-needle U-100 31G X 15/64\" 0.3 ML     100 each    Use 2 syringes daily or as directed.    Gestational diabetes mellitus       ondansetron 4 MG ODT tab    ZOFRAN ODT    30 tablet    Take 1-2 tablets (4-8 mg) by mouth every 8 hours as needed for nausea    Nausea/vomiting in pregnancy       prenatal multivitamin plus iron 27-0.8 MG Tabs per tablet     100 tablet    Take 1 tablet by mouth daily    Pregnancy test positive       tenofovir 300 MG tablet    VIREAD    90 tablet    Take 1 tablet (300 mg) by mouth daily    Viral hepatitis B chronic (H)       * Notice:  This list has 4 medication(s) that are the same as other medications prescribed for you. Read the directions carefully, and ask your doctor or other care provider to review them with you.      "

## 2017-11-17 NOTE — MR AVS SNAPSHOT
After Visit Summary   11/17/2017    Reji Lockwood    MRN: [id number]           Patient Information     Date Of Birth          1980        Visit Information        Provider Department      11/17/2017 10:45 AM [employer]; NURSE ONLY WOMENS [facility]        Today's Diagnoses     Cervical insufficiency during pregnancy, antepartum, second trimester        Supervision of high risk pregnancy, antepartum, second trimester        AMA (advanced maternal age) multigravida 35+, second trimester           Follow-ups after your visit        Your next 10 appointments already scheduled     Nov 17, 2017  1:30 PM CST   Telephone Visit with Mg Cde Provider   [facility]    [address]  [facility] [address]   [phone]           Note: this is not an onsite visit; there is no need to come to the facility.            Nov 17, 2017  3:00 PM CST   ESTABLISHED PRENATAL with Main Norwood MD   [facility] ([facility])    [address] [facility] [address]   [phone]            Nov 22, 2017 10:00 AM CST   Nurse Only with NURSE ONLY WOMENS   [facility] ([facility]    [address] [place] [address] [address]  [facility] [address]   [phone]            Nov 29, 2017 10:00 AM CST   ESTABLISHED PRENATAL with Main Norwood MD   [facility] ([facility])    [address] [facility] [address]   [phone]            Dec 01, 2017 11:00 AM CST   Nurse Only with NURSE ONLY WOMENS   [facility] ([facility])    [address]  [facility] [address]   [phone]            Dec 12, 2017 11:00 AM CST   Return Visit with Mg Cde Provider   [facility])    [address] [place] [address]  "[facility] [address] [phone]            Mar 08, 2018 10:40 AM CST   Return Visit with Breana Hood MD   [facility] ([facility])    [address] [place] [address] [phone]              Who to contact     If you have questions or need follow up information about today's clinic visit or your schedule please contact [facility] directly at [phone].  Normal or non-critical lab and imaging results will be communicated to you by Bocomhart, letter or phone within 4 business days after the clinic has received the results. If you do not hear from us within 7 days, please contact the clinic through Bocomhart or phone. If you have a critical or abnormal lab result, we will notify you by phone as soon as possible.  Submit refill requests through SecondLeap or call your pharmacy and they will forward the refill request to us. Please allow 3 business days for your refill to be completed.          Additional Information About Your Visit        [employer] Systems Information     SecondLeap lets you send messages to your doctor, view your test results, renew your prescriptions, schedule appointments and more. To sign up, go to [email or web address] . Click on \"Log in\" on the left side of the screen, which will take you to the Welcome page. Then click on \"Sign up Now\" on the right side of the page.     You will be asked to enter the access code listed below, as well as some personal information. Please follow the directions to create your username and password.     Your access code is: [email or web address]  Expires: 12/10/2017  2:39 PM     Your access code will  in 90 days. If you need help or a new code, please call your [facility] or [phone].        Care EveryWhere ID     This is your Care EveryWhere ID. This could be used by other organizations to access your [place] medical records  [id number]        Your Vitals Were     Pulse Temperature Last Period " Pulse Oximetry          74 97.7  F (36.5  C) (Oral) 03/28/2017 (Within Weeks) 98%         Blood Pressure from Last 3 Encounters:   11/17/17 103/68   11/07/17 102/72   11/03/17 105/72    Weight from Last 3 Encounters:   11/07/17 65.8 kg (145 lb 1 oz)   11/03/17 64.9 kg (143 lb)   10/26/17 65 kg (143 lb 6.4 oz)              We Performed the Following     HYDROXYPROGESTERONE      INJECTION INTRAMUSCULAR OR SUB-Q          Today's Medication Changes          These changes are accurate as of: 11/17/17 11:44 AM.  If you have any questions, ask your nurse or doctor.               These medicines have changed or have updated prescriptions.        Dose/Directions    * insulin  UNIT/ML injection   Commonly known as:  NovoLIN N VIAL   This may have changed:  Another medication with the same name was changed. Make sure you understand how and when to take each.   Used for:  Gestational diabetes mellitus   Changed by:  Shavon Luo        5 units before breakfast and 5 units before dinner   Quantity:  10 mL   Refills:  3       * insulin isophane human 100 UNIT/ML injection   Commonly known as:  HumuLIN N PEN   This may have changed:  additional instructions   Used for:  Gestational diabetes mellitus   Changed by:  Shavon Luo        Take 5 units before breakfast  And 5 units before dinner   Quantity:  15 mL   Refills:  1       * Notice:  This list has 2 medication(s) that are the same as other medications prescribed for you. Read the directions carefully, and ask your doctor or other care provider to review them with you.             Primary Care Provider Office Phone # Fax #    Fadiasangeetha Serrano [phone]       [address]  [facility] [address]        Equal Access to Services     [facility]: Hadii laurie Giang, tam savage, qaana cheng. So [facility] [phone].    ATENCIÓN: Si po childs gilliam  disposición servicios gratuitos de asistencia lingüística. Deborah quispe [phone].    We comply with applicable federal civil rights laws and [place] laws. We do not discriminate on the basis of race, color, national origin, age, disability, sex, sexual orientation, or gender identity.            Thank you!     Thank you for choosing [facility]  for your care. Our goal is always to provide you with excellent care. Hearing back from our patients is one way we can continue to improve our services. Please take a few minutes to complete the written survey that you may receive in the mail after your visit with us. Thank you!             Your Updated Medication List - Protect others around you: Learn how to safely use, store and throw away your medicines at www.disposemymeds.org.          This list is accurate as of: 11/17/17 11:44 AM.  Always use your most recent med list.                   Brand Name Dispense Instructions for use Diagnosis    acetaminophen 325 MG tablet    TYLENOL    100 tablet    Take 2 tablets (650 mg) by mouth every 4 hours as needed for mild pain or fever    Cervical insufficiency during pregnancy, antepartum, second trimester       acetone (Urine) test Strp     25 each    Use one strip daily to check for urine ketones    Abnormal maternal glucose tolerance, antepartum       * blood glucose monitoring lancets     100 each    Use to test blood sugar 4 times daily or as directed.    Abnormal maternal glucose tolerance, antepartum       * blood glucose monitoring lancets     100 each    Use to test blood sugar 4 times daily or as directed.    Gestational diabetes mellitus       blood glucose monitoring meter device kit     1 kit    Use to test blood sugar 4 times daily or as directed.    Gestational diabetes mellitus (GDM), antepartum, gestational diabetes method of control unspecified       blood glucose monitoring test strip    no brand specified    200 strip    Use to test blood  "sugars 4 times daily or as directed    Abnormal maternal glucose tolerance, antepartum       docusate sodium 100 MG tablet    COLACE    60 tablet    Take 100 mg by mouth daily    Cervical insufficiency during pregnancy, antepartum, second trimester       hydroxyprogesterone caproate in oil 250 mg/mL intramuscular injection    LYDIA    5 mL    Inject 1 mL (250 mg) into the muscle every 7 days    Cervical insufficiency during pregnancy, antepartum, second trimester, Supervision of high risk pregnancy, antepartum, second trimester, History of  delivery, currently pregnant in second trimester       * insulin  UNIT/ML injection    NovoLIN N VIAL    10 mL    5 units before breakfast and 5 units before dinner    Gestational diabetes mellitus       * insulin isophane human 100 UNIT/ML injection    HumuLIN N PEN    15 mL    Take 5 units before breakfast  And 5 units before dinner    Gestational diabetes mellitus       insulin pen needle 31G X 5 MM     100 each    Use 2 pen needles daily or as directed.    Gestational diabetes mellitus       insulin syringe-needle U-100 31G X 15/64\" 0.3 ML     100 each    Use 2 syringes daily or as directed.    Gestational diabetes mellitus       ondansetron 4 MG ODT tab    ZOFRAN ODT    30 tablet    Take 1-2 tablets (4-8 mg) by mouth every 8 hours as needed for nausea    Nausea/vomiting in pregnancy       prenatal multivitamin plus iron 27-0.8 MG Tabs per tablet     100 tablet    Take 1 tablet by mouth daily    Pregnancy test positive       tenofovir 300 MG tablet    VIREAD    90 tablet    Take 1 tablet (300 mg) by mouth daily    Viral hepatitis B chronic (H)       * Notice:  This list has 4 medication(s) that are the same as other medications prescribed for you. Read the directions carefully, and ask your doctor or other care provider to review them with you.      "

## 2017-11-18 NOTE — PROGRESS NOTES
No signif signs, symptoms or concerns except same. Had Tdap early in this pregnancy. Plan weekly NSTs next and add BPP at 36 weeks. Here with . Advice appropriate to gestational age reviewed including pertinent Checklist items. Discussed condition, tests and care plan including RBA. Problem list updated. NST next.  A/P:  Reji was seen today for prenatal care.    Diagnoses and all orders for this visit:    Cervical insufficiency during pregnancy, antepartum    Supervision of high risk pregnancy, antepartum    AMA (advanced maternal age) multigravida 35+        Main Norwood MD

## 2017-11-21 LAB
HBV DNA SERPL NAA+PROBE-ACNC: ABNORMAL [IU]/ML
HBV DNA SERPL NAA+PROBE-LOG IU: 6.1 {LOG_IU}/ML

## 2017-11-22 ENCOUNTER — ALLIED HEALTH/NURSE VISIT (OUTPATIENT)
Dept: NURSING | Facility: CLINIC | Age: 37
End: 2017-11-22
Payer: COMMERCIAL

## 2017-11-22 DIAGNOSIS — O09.522 AMA (ADVANCED MATERNAL AGE) MULTIGRAVIDA 35+, SECOND TRIMESTER: ICD-10-CM

## 2017-11-22 DIAGNOSIS — O34.32 CERVICAL INSUFFICIENCY DURING PREGNANCY, ANTEPARTUM, SECOND TRIMESTER: ICD-10-CM

## 2017-11-22 DIAGNOSIS — O09.92 SUPERVISION OF HIGH RISK PREGNANCY, ANTEPARTUM, SECOND TRIMESTER: ICD-10-CM

## 2017-11-22 PROCEDURE — 96372 THER/PROPH/DIAG INJ SC/IM: CPT

## 2017-11-22 NOTE — MR AVS SNAPSHOT
After Visit Summary   11/22/2017    Reji Lockwood    MRN: [id number]           Patient Information     Date Of Birth          1980        Visit Information        Provider Department      11/22/2017 9:45 AM [facility] [employer]; NURSE ONLY WOMENS [facility] [place] [place]        Today's Diagnoses     Cervical insufficiency during pregnancy, antepartum, second trimester        Supervision of high risk pregnancy, antepartum, second trimester        AMA (advanced maternal age) multigravida 35+, second trimester           Follow-ups after your visit        Your next 10 appointments already scheduled     Nov 27, 2017  4:15 PM CST   [facility] Mg Darone Provider   [facility]    [address] [place] [address]   [phone]           Note: this is not an onsite visit; there is no need to come to the facility.            Nov 29, 2017 10:00 AM CST   ESTABLISHED PRENATAL with Main Norwood MD   [facility] ([facility]    [address] [place] [address]   [phone]            Dec 01, 2017 11:00 AM CST   Nurse Only with NURSE ONLY WOMENS   [facility] [place] [place] ([facility] [place] [place]    [address] [place] [address] [address] [address]   [phone]            Dec 12, 2017 11:00 AM CST   Return Visit with Mg Nerie Provider   [facility]    [address] [facility] [address]   [phone]            Mar 08, 2018 10:40 AM CST   Return Visit with Breana Hood MD   [facility]    [address] [place] [address]   [phone]              Who to contact     If you have questions or need follow up information about today's clinic visit or your schedule please contact [facility] directly at  "[phone].  Normal or non-critical lab and imaging results will be communicated to you by Diversity Marketplacehart, letter or phone within 4 business days after the clinic has received the results. If you do not hear from us within 7 days, please contact the clinic through D[employer] Marketplace[employer] or phone. If you have a critical or abnormal lab result, we will notify you by phone as soon as possible.  Submit refill requests through [email or web address] or call your pharmacy and they will forward the refill request to us. Please allow 3 business days for your refill to be completed.          Additional Information About Your Visit        Diversity Marketplace[employer]Tienda [employer] / [employer] Information     [email or web address] lets you send messages to your doctor, view your test results, renew your prescriptions, schedule appointments and more. To sign up, go to [email or web address] . Click on \"Log in\" on the left side of the screen, which will take you to the Welcome page. Then click on \"Sign up Now\" on the right side of the page.     You will be asked to enter the access code listed below, as well as some personal information. Please follow the directions to create your username and password.     Your access code is: [email or web address]  Expires: 12/10/2017  2:39 PM     Your access code will  in 90 days. If you need help or a new code, please call your [place] clinic or [phone].        Care EveryWhere ID     This is your Care EveryWhere ID. This could be used by other organizations to access your [place] medical records  [id number]        Your Vitals Were     Last Period                   2017 (Within Weeks)            Blood Pressure from Last 3 Encounters:   17 103/70   17 103/68   17 102/72    Weight from Last 3 Encounters:   17 64.9 kg (143 lb)   17 65.8 kg (145 lb 1 oz)   17 64.9 kg (143 lb)              We Performed the Following     HYDROXYPROGESTERONE      INJECTION INTRAMUSCULAR OR SUB-Q          Today's Medication Changes          These changes " are accurate as of: 11/22/17 11:10 AM.  If you have any questions, ask your nurse or doctor.               These medicines have changed or have updated prescriptions.        Dose/Directions    * insulin  UNIT/ML injection   Commonly known as:  NovoLIN N VIAL   This may have changed:  Another medication with the same name was changed. Make sure you understand how and when to take each.   Used for:  Gestational diabetes mellitus        5 units before breakfast and 5 units before dinner   Quantity:  10 mL   Refills:  3       * insulin isophane human 100 UNIT/ML injection   Commonly known as:  HumuLIN N PEN   This may have changed:  additional instructions   Used for:  Gestational diabetes mellitus        Take 5 units before breakfast  And 5 units before dinner   Quantity:  15 mL   Refills:  1       * Notice:  This list has 2 medication(s) that are the same as other medications prescribed for you. Read the directions carefully, and ask your doctor or other care provider to review them with you.             Primary Care Provider Office Phone # Fax #    Fadia Georgia Serrano [phone]       [address]  [facility] [address]        Equal Access to Services     [facility]: Hadii aad ku hadasho Soomaali, waaxda luqadaha, qaybta kaalmada adeegyaangela, ana daily . So [facility] [phone].    ATENCIÓN: Si habla español, tiene a gilliam disposición servicios gratuitos de asistencia lingüística. Deborah al [phone].    We comply with applicable federal civil rights laws and [place] laws. We do not discriminate on the basis of race, color, national origin, age, disability, sex, sexual orientation, or gender identity.            Thank you!     Thank you for choosing [facility]  for your care. Our goal is always to provide you with excellent care. Hearing back from our patients is one way we can continue to improve our services. Please take a  few minutes to complete the written survey that you may receive in the mail after your visit with us. Thank you!             Your Updated Medication List - Protect others around you: Learn how to safely use, store and throw away your medicines at www.disposemymeds.org.          This list is accurate as of: 11/22/17 11:10 AM.  Always use your most recent med list.                   Brand Name Dispense Instructions for use Diagnosis    acetaminophen 325 MG tablet    TYLENOL    100 tablet    Take 2 tablets (650 mg) by mouth every 4 hours as needed for mild pain or fever    Cervical insufficiency during pregnancy, antepartum, second trimester       acetone (Urine) test Strp     25 each    Use one strip daily to check for urine ketones    Abnormal maternal glucose tolerance, antepartum       * blood glucose monitoring lancets     100 each    Use to test blood sugar 4 times daily or as directed.    Abnormal maternal glucose tolerance, antepartum       * blood glucose monitoring lancets     100 each    Use to test blood sugar 4 times daily or as directed.    Gestational diabetes mellitus       blood glucose monitoring meter device kit     1 kit    Use to test blood sugar 4 times daily or as directed.    Gestational diabetes mellitus (GDM), antepartum, gestational diabetes method of control unspecified       blood glucose monitoring test strip    no brand specified    200 strip    Use to test blood sugars 4 times daily or as directed    Abnormal maternal glucose tolerance, antepartum       docusate sodium 100 MG tablet    COLACE    60 tablet    Take 100 mg by mouth daily    Cervical insufficiency during pregnancy, antepartum, second trimester       hydroxyprogesterone caproate in oil 250 mg/mL intramuscular injection    LYDIA    5 mL    Inject 1 mL (250 mg) into the muscle every 7 days    Cervical insufficiency during pregnancy, antepartum, second trimester, Supervision of high risk pregnancy, antepartum, second trimester,  "History of  delivery, currently pregnant in second trimester       * insulin  UNIT/ML injection    NovoLIN N VIAL    10 mL    5 units before breakfast and 5 units before dinner    Gestational diabetes mellitus       * insulin isophane human 100 UNIT/ML injection    HumuLIN N PEN    15 mL    Take 5 units before breakfast  And 5 units before dinner    Gestational diabetes mellitus       insulin pen needle 31G X 5 MM     100 each    Use 2 pen needles daily or as directed.    Gestational diabetes mellitus       insulin syringe-needle U-100 31G X 15/64\" 0.3 ML     100 each    Use 2 syringes daily or as directed.    Gestational diabetes mellitus       ondansetron 4 MG ODT tab    ZOFRAN ODT    30 tablet    Take 1-2 tablets (4-8 mg) by mouth every 8 hours as needed for nausea    Nausea/vomiting in pregnancy       prenatal multivitamin plus iron 27-0.8 MG Tabs per tablet     100 tablet    Take 1 tablet by mouth daily    Pregnancy test positive       tenofovir 300 MG tablet    VIREAD    90 tablet    Take 1 tablet (300 mg) by mouth daily    Viral hepatitis B chronic (H)       * Notice:  This list has 4 medication(s) that are the same as other medications prescribed for you. Read the directions carefully, and ask your doctor or other care provider to review them with you.      "

## 2017-11-22 NOTE — NURSING NOTE
MEDICATION: Hydroxyprogesterone Caproate 250mg/mL in oil   ROUTE: IM  SITE: RUQ - Gluteus  DOSE: 1 ml  LOT #: [id number]  :  [employer]  EXPIRATION DATE:  02/2020  NDC#: 99525-420-53  Patient accompanied by . Used [place] from clinic stock. Patient tolerated injection without complaints. No problems reported. Verified next appt. Patient ambulated out of clinic without complaints. Ca Virgen RN, BAN

## 2017-11-27 ENCOUNTER — TELEPHONE (OUTPATIENT)
Dept: ENDOCRINOLOGY | Facility: CLINIC | Age: 37
End: 2017-11-27

## 2017-11-27 NOTE — TELEPHONE ENCOUNTER
4:30pm: Called pt for a bg report. No answer. Left vm advising pt I was calling for a bg report. Will plan to mingo the pt back david.   4:40pm: Pt returned call. Checking bg fasting and 1 hr post meal. Readings are as follows:    11/27: 89/106/131  11/26: 86/195/87/119  11/25: 115/142/152/144  11/24: 88/108/92/141    Pt advised to increase nph AM dose from 7 to 8 units, PM dose from 9 to 10 units. Pt advised I will call her in one week for another blood sugar report.  Pt verbalized understanding.     Shavon Luo, RN, BSN, CDE   [facility]

## 2017-11-29 ENCOUNTER — PRENATAL OFFICE VISIT (OUTPATIENT)
Dept: OBGYN | Facility: CLINIC | Age: 37
End: 2017-11-29
Payer: COMMERCIAL

## 2017-11-29 VITALS
SYSTOLIC BLOOD PRESSURE: 110 MMHG | DIASTOLIC BLOOD PRESSURE: 68 MMHG | HEART RATE: 89 BPM | BODY MASS INDEX: 29.79 KG/M2 | TEMPERATURE: 97 F | WEIGHT: 145 LBS

## 2017-11-29 DIAGNOSIS — O09.90 SUPERVISION OF HIGH RISK PREGNANCY, ANTEPARTUM: ICD-10-CM

## 2017-11-29 DIAGNOSIS — O24.414 GESTATIONAL DIABETES REQUIRING INSULIN: Primary | ICD-10-CM

## 2017-11-29 DIAGNOSIS — O34.30 CERVICAL INSUFFICIENCY DURING PREGNANCY, ANTEPARTUM: ICD-10-CM

## 2017-11-29 PROCEDURE — 59025 FETAL NON-STRESS TEST: CPT | Performed by: OBSTETRICS & GYNECOLOGY

## 2017-11-29 PROCEDURE — 99207 ZZC PRENATAL VISIT: CPT | Mod: 25 | Performed by: OBSTETRICS & GYNECOLOGY

## 2017-11-29 NOTE — PROGRESS NOTES
No signif signs, symptoms or concerns. Glc levels ok on insulin. Continuing 17P through 35 weeks. Here with .   A non-stress test was performed to assess fetal well-being for the indication(s) noted and condition as described and this was reactive.   Advice appropriate to gestational age reviewed including pertinent Checklist items. Discussed condition, tests and care plan including RBA. Problem list updated. NST next.  A/P:  Reji was seen today for prenatal care.    Diagnoses and all orders for this visit:    Gestational diabetes requiring insulin  -     FETAL NON-STRESS TEST    Cervical insufficiency during pregnancy, antepartum    Supervision of high risk pregnancy, antepartum  -     FETAL NON-STRESS TEST        Main Norwood MD

## 2017-11-29 NOTE — NURSING NOTE
"Chief Complaint   Patient presents with     Prenatal Care     OBV 33w2d       Initial /68 (BP Location: Left arm, Patient Position: Chair, Cuff Size: Adult Regular)  Pulse 89  Temp 97  F (36.1  C) (Oral)  Wt 145 lb (65.8 kg)  LMP 03/28/2017 (Within Weeks)  Breastfeeding? No  BMI 29.79 kg/m2 Estimated body mass index is 29.79 kg/(m^2) as calculated from the following:    Height as of 11/7/17: 4' 10.5\" (1.486 m).    Weight as of this encounter: 145 lb (65.8 kg).  Medication Reconciliation: complete   Kezia Crump CMA      "

## 2017-11-29 NOTE — PATIENT INSTRUCTIONS
If you have any questions regarding your visit, Please contact your care team.     [email or web address]: [phone]    [facility] CLINIC HOURS TELEPHONE NUMBER   ANN Zaragoza-    Gaby Penaloza-NAYA Mast-Medical Assistant   Monday-[place]  8:00a.m-4:45 p.m  Wednesday-[place] 8:00a.m-4:45 p.m.  Thursday-[place]  8:00a.m-4:45 p.m.  Friday-[place]  8:00a.m-4:45 p.m. [facility]  [address] [place] [address]  [phone] ask [facility] [place] [facility]  [phone] Fax  Imaging [phone]    [facility] Labor and Delivery  [address] [place] [address] [place] [address]  [phone]    [facility]  [address] [place] [address]  [phone] [facility]  [phone] Fax  Imaging [phone]     Urgent Care locations:    [place]        [place] Monday-Friday  5 pm - 9 pm  Saturday and Sunday   9 am - 5 pm    Monday-Friday   11 am - 9 pm  Saturday and Sunday   9 am - 5 pm   [phone]       If you need a medication refill, please contact your pharmacy. Please allow 3 business days for your refill to be completed.  As always, Thank you for trusting us with your healthcare needs!

## 2017-11-29 NOTE — MR AVS SNAPSHOT
After Visit Summary   11/29/2017    Reji Lockwood    MRN: [id number]           Patient Information     Date Of Birth          1980        Visit Information        Provider Department      11/29/2017 9:45 AM Main Norwood MD; [facility] [employer] [facility]        Today's Diagnoses     Cervical insufficiency during pregnancy, antepartum        Supervision of high risk pregnancy, antepartum        AMA (advanced maternal age) multigravida 35+          Care Instructions                                                        If you have any questions regarding your visit, Please contact your care team.     [facility]: [phone]    [facility] CLINIC HOURS TELEPHONE NUMBER   Main Norwood M.D.      [place]-    Gaby Penaloza-NAYA Mast-Medical Assistant   Monday-[place]  8:00a.m-4:45 p.m  Wednesday-[place] 8:00a.m-4:45 p.m.  Thursday-[place]  8:00a.m-4:45 p.m.  Friday-[place]  8:00a.m-4:45 p.m. [facility]  [address] [place] [address]  [phone] ask for [facility]  [phone] Fax  Imaging [phone]    [facility] Labor and Delivery  [address] [place] [address] [place] [address]  [phone]    [facility]  [address] [facility] [place] [address] [phone] ask [facility]  [phone] Fax  Imaging [phone]     Urgent Care locations:    [facility] Monday-Friday  5 pm - 9 pm  Saturday and Sunday   9 am - 5 pm    Monday-Friday   11 am - 9 pm  Saturday and Sunday   9 am - 5 pm   [phone]       If you need a medication refill, please contact your pharmacy. Please allow 3 business days for your refill to be completed.  As always, Thank you for trusting us with your healthcare needs!            Follow-ups after your visit        Your next 10 appointments already scheduled     Nov 30,  "2017 10:00 AM CST   Nurse Only with NURSE ONLY WOMENS   [facility] ([facility]    [address]  [facility] [address]   [phone]            Dec 04, 2017  3:00 PM CST   Telephone Visit with Mg Cde Provider   [facility]    [address]  [facility] [address]   [phone]           Note: this is not an onsite visit; there is no need to come to the facility.            Dec 12, 2017 11:00 AM CST   Return Visit with Mg Cde Provider   [facility]    [address] [facility] [address]   [phone]            Mar 08, 2018 10:40 AM CST   Return Visit with Breana Hood MD   [facility]    [address]  [facility] [address]   [phone]              Who to contact     If you have questions or need follow up information about today's clinic visit or your schedule please contact [facility] directly at [phone].  Normal or non-critical lab and imaging results will be communicated to you by MyChart, letter or phone within 4 business days after the clinic has received the results. If you do not hear from us within 7 days, please contact the clinic through In[employer] Log[employer] or phone. If you have a critical or abnormal lab result, we will notify you by phone as soon as possible.  Submit refill requests through [employer] or call your pharmacy and they will forward the refill request to us. Please allow 3 business days for your refill to be completed.          Additional Information About Your Visit        [employer] LogicharCENX Information     [employer] lets you send messages to your doctor, view your test results, renew your prescriptions, schedule appointments and more. To sign up, go to [email or web address] . Click on \"Log in\" on the left side of the screen, which will take you to the " "Welcome page. Then click on \"Sign up Now\" on the right side of the page.     You will be asked to enter the access code listed below, as well as some personal information. Please follow the directions to create your username and password.     Your access code is: [email or web address]  Expires: 12/10/2017  2:39 PM     Your access code will  in 90 days. If you need help or a new code, please call your [place] clinic or [phone].        Care EveryWhere ID     This is your Care EveryWhere ID. This could be used by other organizations to access your [place] medical records  [id number]        Your Vitals Were     Pulse Temperature Last Period Breastfeeding? BMI (Body Mass Index)       89 97  F (36.1  C) (Oral) 2017 (Within Weeks) No 29.79 kg/m2        Blood Pressure from Last 3 Encounters:   17 110/68   17 103/70   17 103/68    Weight from Last 3 Encounters:   17 145 lb (65.8 kg)   17 143 lb (64.9 kg)   17 145 lb 1 oz (65.8 kg)              Today, you had the following     No orders found for display         Today's Medication Changes          These changes are accurate as of: 17 10:08 AM.  If you have any questions, ask your nurse or doctor.               These medicines have changed or have updated prescriptions.        Dose/Directions    * insulin  UNIT/ML injection   Commonly known as:  NovoLIN N VIAL   This may have changed:  Another medication with the same name was changed. Make sure you understand how and when to take each.   Used for:  Gestational diabetes mellitus   Changed by:  Shavon Luo        5 units before breakfast and 5 units before dinner   Quantity:  10 mL   Refills:  3       * insulin isophane human 100 UNIT/ML injection   Commonly known as:  HumuLIN N PEN   This may have changed:  additional instructions   Used for:  Gestational diabetes mellitus   Changed by:  Shavon Luo        Take 5 units before breakfast  And 5 units before dinner "   Quantity:  15 mL   Refills:  1       * Notice:  This list has 2 medication(s) that are the same as other medications prescribed for you. Read the directions carefully, and ask your doctor or other care provider to review them with you.             Primary Care Provider Office Phone # Fax #    Fadia Manzanaresjosee Serrano -[phone]       [address]  [facility] [address]        Equal Access to Services     [facility] : Hadii aad ku hadasho Soomaali, waaxda luqadaha, qaybta kaalmada adeegyada, waxay idiin hayaan adeeg kharash la'yanira . So [facility] [phone].    ATENCIÓN: Si mayur hamilton, tiene a gilliam disposición servicios gratuitos de asistencia lingüística. Llame al [phone].    We comply with applicable federal civil rights laws and [place] laws. We do not discriminate on the basis of race, color, national origin, age, disability, sex, sexual orientation, or gender identity.            Thank you!     Thank you for choosing [facility]  for your care. Our goal is always to provide you with excellent care. Hearing back from our patients is one way we can continue to improve our services. Please take a few minutes to complete the written survey that you may receive in the mail after your visit with us. Thank you!             Your Updated Medication List - Protect others around you: Learn how to safely use, store and throw away your medicines at www.disposemymeds.org.          This list is accurate as of: 11/29/17 10:08 AM.  Always use your most recent med list.                   Brand Name Dispense Instructions for use Diagnosis    acetaminophen 325 MG tablet    TYLENOL    100 tablet    Take 2 tablets (650 mg) by mouth every 4 hours as needed for mild pain or fever    Cervical insufficiency during pregnancy, antepartum, second trimester       acetone (Urine) test Strp     25 each    Use one strip daily to check for urine ketones    Abnormal maternal glucose  "tolerance, antepartum       * blood glucose monitoring lancets     100 each    Use to test blood sugar 4 times daily or as directed.    Abnormal maternal glucose tolerance, antepartum       * blood glucose monitoring lancets     100 each    Use to test blood sugar 4 times daily or as directed.    Gestational diabetes mellitus       blood glucose monitoring meter device kit     1 kit    Use to test blood sugar 4 times daily or as directed.    Gestational diabetes mellitus (GDM), antepartum, gestational diabetes method of control unspecified       blood glucose monitoring test strip    no brand specified    200 strip    Use to test blood sugars 4 times daily or as directed    Abnormal maternal glucose tolerance, antepartum       docusate sodium 100 MG tablet    COLACE    60 tablet    Take 100 mg by mouth daily    Cervical insufficiency during pregnancy, antepartum, second trimester       hydroxyprogesterone caproate in oil 250 mg/mL intramuscular injection    LYDIA    5 mL    Inject 1 mL (250 mg) into the muscle every 7 days    Cervical insufficiency during pregnancy, antepartum, second trimester, Supervision of high risk pregnancy, antepartum, second trimester, History of  delivery, currently pregnant in second trimester       * insulin  UNIT/ML injection    NovoLIN N VIAL    10 mL    5 units before breakfast and 5 units before dinner    Gestational diabetes mellitus       * insulin isophane human 100 UNIT/ML injection    HumuLIN N PEN    15 mL    Take 5 units before breakfast  And 5 units before dinner    Gestational diabetes mellitus       insulin pen needle 31G X 5 MM     100 each    Use 2 pen needles daily or as directed.    Gestational diabetes mellitus       insulin syringe-needle U-100 31G X 15/64\" 0.3 ML     100 each    Use 2 syringes daily or as directed.    Gestational diabetes mellitus       ondansetron 4 MG ODT tab    ZOFRAN ODT    30 tablet    Take 1-2 tablets (4-8 mg) by mouth every 8 " hours as needed for nausea    Nausea/vomiting in pregnancy       prenatal multivitamin plus iron 27-0.8 MG Tabs per tablet     100 tablet    Take 1 tablet by mouth daily    Pregnancy test positive       tenofovir 300 MG tablet    VIREAD    90 tablet    Take 1 tablet (300 mg) by mouth daily    Viral hepatitis B chronic (H)       * Notice:  This list has 4 medication(s) that are the same as other medications prescribed for you. Read the directions carefully, and ask your doctor or other care provider to review them with you.

## 2017-11-30 ENCOUNTER — ALLIED HEALTH/NURSE VISIT (OUTPATIENT)
Dept: NURSING | Facility: CLINIC | Age: 37
End: 2017-11-30
Payer: COMMERCIAL

## 2017-11-30 DIAGNOSIS — O09.522 AMA (ADVANCED MATERNAL AGE) MULTIGRAVIDA 35+, SECOND TRIMESTER: ICD-10-CM

## 2017-11-30 DIAGNOSIS — O34.32 CERVICAL INSUFFICIENCY DURING PREGNANCY, ANTEPARTUM, SECOND TRIMESTER: ICD-10-CM

## 2017-11-30 DIAGNOSIS — O09.92 SUPERVISION OF HIGH RISK PREGNANCY, ANTEPARTUM, SECOND TRIMESTER: ICD-10-CM

## 2017-11-30 PROCEDURE — 96372 THER/PROPH/DIAG INJ SC/IM: CPT

## 2017-11-30 PROCEDURE — 99207 ZZC NO CHARGE NURSE ONLY: CPT

## 2017-11-30 NOTE — NURSING NOTE
MEDICATION: Hydroxyprogesterone Caproate 250mg/mL in oil   ROUTE: IM  SITE: LUQ - Gluteus  DOSE: 1 ml  LOT #: [id number]  :  [employer]  EXPIRATION DATE:  02/2020  NDC#: 19904-633-88  Used Lakshmi from clinic stock. Patient was accompanied by . Patient tolerated injection without complaint. Patient denied any local itching or other symptoms. Verified patient would need two more injections after today. Explained clinic staff will call patient later today or tomorrow to schedule her appointment for next week. Patient stated she would not need an  when staff calls back to schedule appt. Patient stated she does not need an  when she is just coming in for injections. Explained interpreters are arranged through the computers and not really able to cancel for the injection appts. Patient agreed to continue with . Patient ambulated out of clinic without complaint. Ca Virgen RN, BAN

## 2017-12-04 ENCOUNTER — TELEPHONE (OUTPATIENT)
Dept: ENDOCRINOLOGY | Facility: CLINIC | Age: 37
End: 2017-12-04

## 2017-12-04 NOTE — TELEPHONE ENCOUNTER
Spoke with pt for a bg report. Gdm - taking nph - oscar 01/15/18. Checking fasting and 1 hr post meal.  12/04: 94/115/139  12/03: 79/141/152/158  12/02: 99/153/115/106  12/01: 103/146/115/128  Pt advised to increase AM dose nph from 8 to 9 units . Keep PM dose at 10 units. Pt reminded of appt with me next Tues, 12/12.     Shavon Luo, RN, BSN, CDE   [facility]

## 2017-12-08 ENCOUNTER — ALLIED HEALTH/NURSE VISIT (OUTPATIENT)
Dept: NURSING | Facility: CLINIC | Age: 37
End: 2017-12-08
Payer: COMMERCIAL

## 2017-12-08 ENCOUNTER — PRENATAL OFFICE VISIT (OUTPATIENT)
Dept: OBGYN | Facility: CLINIC | Age: 37
End: 2017-12-08
Payer: COMMERCIAL

## 2017-12-08 VITALS
BODY MASS INDEX: 29.99 KG/M2 | TEMPERATURE: 97.2 F | DIASTOLIC BLOOD PRESSURE: 70 MMHG | SYSTOLIC BLOOD PRESSURE: 122 MMHG | WEIGHT: 146 LBS | HEART RATE: 83 BPM

## 2017-12-08 DIAGNOSIS — O09.213 HISTORY OF PRETERM LABOR, CURRENT PREGNANCY, THIRD TRIMESTER: Primary | ICD-10-CM

## 2017-12-08 DIAGNOSIS — O34.32 CERVICAL INSUFFICIENCY DURING PREGNANCY, ANTEPARTUM, SECOND TRIMESTER: ICD-10-CM

## 2017-12-08 DIAGNOSIS — O09.522 AMA (ADVANCED MATERNAL AGE) MULTIGRAVIDA 35+, SECOND TRIMESTER: ICD-10-CM

## 2017-12-08 DIAGNOSIS — O24.414 GESTATIONAL DIABETES REQUIRING INSULIN: Primary | ICD-10-CM

## 2017-12-08 DIAGNOSIS — O09.90 SUPERVISION OF HIGH RISK PREGNANCY, ANTEPARTUM: ICD-10-CM

## 2017-12-08 DIAGNOSIS — O09.92 SUPERVISION OF HIGH RISK PREGNANCY, ANTEPARTUM, SECOND TRIMESTER: ICD-10-CM

## 2017-12-08 PROCEDURE — 59025 FETAL NON-STRESS TEST: CPT | Performed by: OBSTETRICS & GYNECOLOGY

## 2017-12-08 PROCEDURE — 96372 THER/PROPH/DIAG INJ SC/IM: CPT

## 2017-12-08 PROCEDURE — 99207 ZZC PRENATAL VISIT: CPT | Mod: 25 | Performed by: OBSTETRICS & GYNECOLOGY

## 2017-12-08 PROCEDURE — 99207 ZZC NO CHARGE NURSE ONLY: CPT

## 2017-12-08 NOTE — NURSING NOTE
"Chief Complaint   Patient presents with     Prenatal Care     OBV 34w4d       Initial /70 (BP Location: Left arm, Patient Position: Chair, Cuff Size: Adult Regular)  Pulse 83  Temp 97.2  F (36.2  C) (Oral)  Wt 146 lb (66.2 kg)  LMP 03/28/2017 (Within Weeks)  Breastfeeding? No  BMI 29.99 kg/m2 Estimated body mass index is 29.99 kg/(m^2) as calculated from the following:    Height as of 11/7/17: 4' 10.5\" (1.486 m).    Weight as of this encounter: 146 lb (66.2 kg).  Medication Reconciliation: complete   Kezia Crump CMA      "

## 2017-12-08 NOTE — PATIENT INSTRUCTIONS
If you have any questions regarding your visit, Please contact your care team.     [email or web address]: [phone]    [facility] CLINIC HOURS TELEPHONE NUMBER   ANN Zaragoza-    Gaby Penaloza-NAYA Mast-Medical Assistant   Monday-[place]  8:00a.m-4:45 p.m  Wednesday-[place] 8:00a.m-4:45 p.m.  Thursday-[place]  8:00a.m-4:45 p.m.  Friday-[place]  8:00a.m-4:45 p.m. [facility]  [address] [place] [address]  [phone] ask [facility]  [phone] Fax  Imaging [phone]    [facility]  [address] [place] [address] [place] [address]  [phone]    [facility]  [address] [place] [address]  [phone] [facility]  [phone] Fax  Imaging [phone]     Urgent Care locations:    [place]        [place] Monday-Friday  5 pm - 9 pm  Saturday and Sunday   9 am - 5 pm    Monday-Friday   11 am - 9 pm  Saturday and Sunday   9 am - 5 pm   [phone]       If you need a medication refill, please contact your pharmacy. Please allow 3 business days for your refill to be completed.  As always, Thank you for trusting us with your healthcare needs!

## 2017-12-08 NOTE — MR AVS SNAPSHOT
After Visit Summary   2017    Reji Lockwood    MRN: [id number]           Patient Information     Date Of Birth          1980        Visit Information        Provider Department      2017 9:30 AM MULTILINGUAL WORD; NURSE ONLY [place] [facility]        Today's Diagnoses     History of  labor, current pregnancy, third trimester    -  1    Cervical insufficiency during pregnancy, antepartum, second trimester        Supervision of high risk pregnancy, antepartum, second trimester        AMA (advanced maternal age) multigravida 35+, second trimester           Follow-ups after your visit        Your next 10 appointments already scheduled     Dec 08, 2017 12:45 PM CST   Office Visit with Main Norwood MD   [facility]    [address] [facility] [address] [phone]           Bring a current list of meds and any records pertaining to this visit. For Physicals, please bring immunization records and any forms needing to be filled out. Please arrive 10 minutes early to complete paperwork.            Dec 12, 2017 11:00 AM CST   [facility] Visit with Mg Cde Provider   [facility]    [address] [place] [id number] [phone]            Dec 15, 2017 10:00 AM CST   [facility]   [facility]    [id number] [place] [address] [address] [facility] [address]  [facility] [address] [phone]            Mar 08, 2018 10:40 AM CST   Return Visit with Breana Hood MD   [facility]    [address] [place] [id number] [phone]              Who to contact     If you have questions or need follow up information about today's clinic visit or your schedule please contact [facility] directly at [phone].  Normal or  "non-critical lab and imaging results will be communicated to you by MyChart, letter or phone within 4 business days after the clinic has received the results. If you do not hear from us within 7 days, please contact the clinic through [employer] or phone. If you have a critical or abnormal lab result, we will notify you by phone as soon as possible.  Submit refill requests through [employer] or call your pharmacy and they will forward the refill request to us. Please allow 3 business days for your refill to be completed.          Additional Information About Your Visit        [employer] Information     [employer] lets you send messages to your doctor, view your test results, renew your prescriptions, schedule appointments and more. To sign up, go to [email or web address] . Click on \"Log in\" on the left side of the screen, which will take you to the Welcome page. Then click on \"Sign up Now\" on the right side of the page.     You will be asked to enter the access code listed below, as well as some personal information. Please follow the directions to create your username and password.     Your access code is: [email or web address]  Expires: 12/10/2017  2:39 PM     Your access code will  in 90 days. If you need help or a new code, please call your [facility] clinic or [phone].        Care EveryWhere ID     This is your Care EveryWhere ID. This could be used by other organizations to access your [facility] medical records  [id number]        Your Vitals Were     Last Period                   2017 (Within Weeks)            Blood Pressure from Last 3 Encounters:   17 110/68   17 103/70   17 103/68    Weight from Last 3 Encounters:   17 145 lb (65.8 kg)   17 143 lb (64.9 kg)   17 145 lb 1 oz (65.8 kg)              We Performed the Following     HC  INJ HYDROXYPROGESTERONE CAPROATE, 1MG     HYDROXYPROGESTERONE      INJECTION INTRAMUSCULAR OR SUB-Q          Today's Medication Changes    "       These changes are accurate as of: 12/8/17  9:57 AM.  If you have any questions, ask your nurse or doctor.               These medicines have changed or have updated prescriptions.        Dose/Directions    * insulin  UNIT/ML injection   Commonly known as:  NovoLIN N VIAL   This may have changed:  Another medication with the same name was changed. Make sure you understand how and when to take each.   Used for:  Gestational diabetes mellitus   Changed by:  Blue, Shavon        5 units before breakfast and 5 units before dinner   Quantity:  10 mL   Refills:  3       * insulin isophane human 100 UNIT/ML injection   Commonly known as:  HumuLIN N PEN   This may have changed:  additional instructions   Used for:  Gestational diabetes mellitus   Changed by:  Blue, Shavon        Take 5 units before breakfast  And 5 units before dinner   Quantity:  15 mL   Refills:  1       * Notice:  This list has 2 medication(s) that are the same as other medications prescribed for you. Read the directions carefully, and ask your doctor or other care provider to review them with you.             Primary Care Provider Office Phone # Fax #    Fadia Georgiache Serrano [phone]       [address] [facility] [address]        Equal Access to Services     [facility] AH: Hadii aad ku hadasho Soomaali, waaxda luqadaha, qaybta kaalmada adeegyada, ana salvador. So [facility] [phone].    ATENCIÓN: Si habla español, tiene a gilliam disposición servicios gratuitos de asistencia lingüística. Llame al [phone].    We comply with applicable federal civil rights laws and [place] laws. We do not discriminate on the basis of race, color, national origin, age, disability, sex, sexual orientation, or gender identity.            Thank you!     Thank you for choosing [facility]  for your care. Our goal is always to provide you with excellent care. Hearing back from our  patients is one way we can continue to improve our services. Please take a few minutes to complete the written survey that you may receive in the mail after your visit with us. Thank you!             Your Updated Medication List - Protect others around you: Learn how to safely use, store and throw away your medicines at www.disposemymeds.org.          This list is accurate as of: 12/8/17  9:57 AM.  Always use your most recent med list.                   Brand Name Dispense Instructions for use Diagnosis    acetaminophen 325 MG tablet    TYLENOL    100 tablet    Take 2 tablets (650 mg) by mouth every 4 hours as needed for mild pain or fever    Cervical insufficiency during pregnancy, antepartum, second trimester       acetone (Urine) test Strp     25 each    Use one strip daily to check for urine ketones    Abnormal maternal glucose tolerance, antepartum       * blood glucose monitoring lancets     100 each    Use to test blood sugar 4 times daily or as directed.    Abnormal maternal glucose tolerance, antepartum       * blood glucose monitoring lancets     100 each    Use to test blood sugar 4 times daily or as directed.    Gestational diabetes mellitus       blood glucose monitoring meter device kit     1 kit    Use to test blood sugar 4 times daily or as directed.    Gestational diabetes mellitus (GDM), antepartum, gestational diabetes method of control unspecified       blood glucose monitoring test strip    no brand specified    200 strip    Use to test blood sugars 4 times daily or as directed    Abnormal maternal glucose tolerance, antepartum       docusate sodium 100 MG tablet    COLACE    60 tablet    Take 100 mg by mouth daily    Cervical insufficiency during pregnancy, antepartum, second trimester       hydroxyprogesterone caproate in oil 250 mg/mL intramuscular injection    LYDIA    5 mL    Inject 1 mL (250 mg) into the muscle every 7 days    Cervical insufficiency during pregnancy, antepartum, second  "trimester, Supervision of high risk pregnancy, antepartum, second trimester, History of  delivery, currently pregnant in second trimester       * insulin  UNIT/ML injection    NovoLIN N VIAL    10 mL    5 units before breakfast and 5 units before dinner    Gestational diabetes mellitus       * insulin isophane human 100 UNIT/ML injection    HumuLIN N PEN    15 mL    Take 5 units before breakfast  And 5 units before dinner    Gestational diabetes mellitus       insulin pen needle 31G X 5 MM     100 each    Use 2 pen needles daily or as directed.    Gestational diabetes mellitus       insulin syringe-needle U-100 31G X 15/64\" 0.3 ML     100 each    Use 2 syringes daily or as directed.    Gestational diabetes mellitus       ondansetron 4 MG ODT tab    ZOFRAN ODT    30 tablet    Take 1-2 tablets (4-8 mg) by mouth every 8 hours as needed for nausea    Nausea/vomiting in pregnancy       prenatal multivitamin plus iron 27-0.8 MG Tabs per tablet     100 tablet    Take 1 tablet by mouth daily    Pregnancy test positive       tenofovir 300 MG tablet    VIREAD    90 tablet    Take 1 tablet (300 mg) by mouth daily    Viral hepatitis B chronic (H)       * Notice:  This list has 4 medication(s) that are the same as other medications prescribed for you. Read the directions carefully, and ask your doctor or other care provider to review them with you.      "

## 2017-12-08 NOTE — PROGRESS NOTES
No signif signs, symptoms or concerns. Glc levels stable. No  today.   A non-stress test was performed to assess fetal well-being for the indication(s) noted and condition as described and this was reactive.   Advice appropriate to gestational age reviewed including pertinent Checklist items. Discussed condition, tests and care plan including RBA. Problem list updated. GBS and remove cerclage next along with u/s for EFW and BPP that week.   A/P:  Reji was seen today for prenatal care.    Diagnoses and all orders for this visit:    Gestational diabetes requiring insulin  -     US OB Limited One Or More Fetuses; Future  -     US Fetal Biophys Prof w/o Non Stress Test; Future  -     FETAL NON-STRESS TEST    Supervision of high risk pregnancy, antepartum  -     US OB Limited One Or More Fetuses; Future  -     US Fetal Biophys Prof w/o Non Stress Test; Future        Main Norwood MD

## 2017-12-08 NOTE — PROGRESS NOTES
The following medication was given:         Used [place] from clinic stock.   present with patient.  Pt has no concerns/questions.  Appt scheduled for injection 12/15/17.  Injection given without difficulty.  Pt ambulated out of clinic.  OV later today with Dr. Norwood.  Amy Martines RN

## 2017-12-12 ENCOUNTER — OFFICE VISIT (OUTPATIENT)
Dept: NURSING | Facility: CLINIC | Age: 37
End: 2017-12-12
Payer: COMMERCIAL

## 2017-12-12 DIAGNOSIS — E11.9 DIABETES MELLITUS WITHOUT COMPLICATION (H): Primary | ICD-10-CM

## 2017-12-12 PROCEDURE — 99207 ZZC NO CHARGE LOS: CPT

## 2017-12-12 NOTE — MR AVS SNAPSHOT
After Visit Summary   12/12/2017    Reji Lockwood    MRN: [id number]           Patient Information     Date Of Birth          1980        Visit Information        Provider Department      12/12/2017 10:45 AM Provider, Mg Benavidez; [employer] [facility]        Today's Diagnoses     gestational diabetes    -  1       Follow-ups after your visit        Your next 10 appointments already scheduled     Dec 15, 2017  9:45 AM CST   Nurse Only with NURSE ONLY WOMENS   [facility])    [address] [place] [address] [address]  [facility] [address]   [phone]            Dec 19, 2017 11:15 AM CST   ESTABLISHED PRENATAL with Main Norwood MD, [facility] COLP/LEEP ROOM   [facility])    [address] [facility] [address]   [phone]            Dec 20, 2017  9:30 AM CST   (Arrive by 9:15 AM)   US FETAL BIOPHYS PROFILE W/O NON STRESS TEST with BKUS1   [facility])    [address] [facility] [address]   [phone]           Please bring a list of your medicines (including vitamins, minerals and over-the-counter drugs). Also, tell your doctor about any allergies you may have. Wear comfortable clothes and leave your valuables at home.  If you re less than 20 weeks drink four 8-ounce glasses of fluid an hour before your exam. If you need to empty your bladder before your exam, try to release only a little urine. Then, drink another glass of fluid.  You may have up to two family members in the exam room. If you bring a small child, an adult must be there to care for him or her.  Please call the Imaging Department at your exam site with any questions.            Dec 20, 2017 10:15 AM CST   (Arrive by 10:00 AM)   US OB LIMITED ONE OR MORE FETUSES with BKUS1   [facility] [place]  [facility]    [address] [facility] [address]   [phone]           Please bring a list of your medicines (including vitamins, minerals and over-the-counter drugs). Also, tell your doctor about any allergies you may have. Wear comfortable clothes and leave your valuables at home.  If you re less than 20 weeks drink four 8-ounce glasses of fluid an hour before your exam. If you need to empty your bladder before your exam, try to release only a little urine. Then, drink another glass of fluid.  You may have up to two family members in the exam room. If you bring a small child, an adult must be there to care for him or her.  Please call the Imaging Department at your exam site with any questions.            Dec 20, 2017  1:00 PM CST   Telephone Visit with Mg Cde Provider   [facility]    [address] [place] [id number] [phone]           Note: this is not an onsite visit; there is no need to come to the facility.            Jan 02, 2018 11:00 AM CST   Return Visit with Mg Cde Provider   [facility]    [address] [place] [id number] [phone]            Mar 08, 2018 10:40 AM CST   Return Visit with Breana Hood MD   [facility])    [address] [place] [id number] [phone]              Who to contact     If you have questions or need follow up information about today's clinic visit or your schedule please contact [facility] directly at [phone].  Normal or non-critical lab and imaging results will be communicated to you by MyChart, letter or phone within 4 business days after the clinic has received the results. If you do not hear from us within 7 days, please contact the clinic through MyChart or phone. If you have a critical or abnormal lab result, we will  notify you by phone as soon as possible.  Submit refill requests through Submitnet or call your pharmacy and they will forward the refill request to us. Please allow 3 business days for your refill to be completed.          Additional Information About Your Visit        Submitnet Information     Submitnet is an electronic gateway that provides easy, online access to your medical records. With Submitnet, you can request a clinic appointment, read your test results, renew a prescription or communicate with your care team.     To sign up for Submitnet visit the website at [email or web address]   You will be asked to enter the access code listed below, as well as some personal information. Please follow the directions to create your username and password.     Your access code is: [email or web address]  Expires: 3/12/2018  3:31 PM     Your access code will  in 90 days. If you need help or a new code, please contact your [facility] or call [phone] for assistance.        Care EveryWhere ID     This is your Care EveryWhere ID. This could be used by other organizations to access your [place] medical records  [id number]        Your Vitals Were     Last Period                   2017 (Within Weeks)            Blood Pressure from Last 3 Encounters:   17 122/70   17 110/68   17 103/70    Weight from Last 3 Encounters:   17 66.2 kg (146 lb)   17 65.8 kg (145 lb)   17 64.9 kg (143 lb)              Today, you had the following     No orders found for display         Today's Medication Changes          These changes are accurate as of: 17  3:31 PM.  If you have any questions, ask your nurse or doctor.               These medicines have changed or have updated prescriptions.        Dose/Directions    * insulin  UNIT/ML injection   Commonly known as:  NovoLIN N VIAL   This may have changed:  Another medication with the same name was changed. Make  sure you understand how and when to take each.   Used for:  Gestational diabetes mellitus        5 units before breakfast and 5 units before dinner   Quantity:  10 mL   Refills:  3       * insulin isophane human 100 UNIT/ML injection   Commonly known as:  HumuLIN N PEN   This may have changed:  additional instructions   Used for:  Gestational diabetes mellitus        Take 5 units before breakfast  And 5 units before dinner   Quantity:  15 mL   Refills:  1       * Notice:  This list has 2 medication(s) that are the same as other medications prescribed for you. Read the directions carefully, and ask your doctor or other care provider to review them with you.             Primary Care Provider Office Phone # Fax #    Fadia Georgia Serrano [phone]       [address]  [place] [facility] [address]        Equal Access to Services     TERRENCE MCCORMICK : Hadii laurie mark hadasho Soomaali, waaxda luqadaha, qaybta kaalmada adeegyada, waxay idiin hayyanira daily . So [facility] [phone].    ATENCIÓN: Si habla español, tiene a gilliam disposición servicios gratuitos de asistencia lingüística. Llame al [phone].    We comply with applicable federal civil rights laws and [place] laws. We do not discriminate on the basis of race, color, national origin, age, disability, sex, sexual orientation, or gender identity.            Thank you!     Thank you for choosing [facility]  for your care. Our goal is always to provide you with excellent care. Hearing back from our patients is one way we can continue to improve our services. Please take a few minutes to complete the written survey that you may receive in the mail after your visit with us. Thank you!             Your Updated Medication List - Protect others around you: Learn how to safely use, store and throw away your medicines at www.disposemymeds.org.          This list is accurate as of: 12/12/17  3:31 PM.  Always use your  most recent med list.                   Brand Name Dispense Instructions for use Diagnosis    acetaminophen 325 MG tablet    TYLENOL    100 tablet    Take 2 tablets (650 mg) by mouth every 4 hours as needed for mild pain or fever    Cervical insufficiency during pregnancy, antepartum, second trimester       acetone (Urine) test Strp     25 each    Use one strip daily to check for urine ketones    Abnormal maternal glucose tolerance, antepartum       * blood glucose monitoring lancets     100 each    Use to test blood sugar 4 times daily or as directed.    Abnormal maternal glucose tolerance, antepartum       * blood glucose monitoring lancets     100 each    Use to test blood sugar 4 times daily or as directed.    Gestational diabetes mellitus       blood glucose monitoring meter device kit     1 kit    Use to test blood sugar 4 times daily or as directed.    Gestational diabetes mellitus (GDM), antepartum, gestational diabetes method of control unspecified       blood glucose monitoring test strip    no brand specified    200 strip    Use to test blood sugars 4 times daily or as directed    Abnormal maternal glucose tolerance, antepartum       docusate sodium 100 MG tablet    COLACE    60 tablet    Take 100 mg by mouth daily    Cervical insufficiency during pregnancy, antepartum, second trimester       hydroxyprogesterone caproate in oil 250 mg/mL intramuscular injection    LYDIA    5 mL    Inject 1 mL (250 mg) into the muscle every 7 days    Cervical insufficiency during pregnancy, antepartum, second trimester, Supervision of high risk pregnancy, antepartum, second trimester, History of  delivery, currently pregnant in second trimester       * insulin  UNIT/ML injection    NovoLIN N VIAL    10 mL    5 units before breakfast and 5 units before dinner    Gestational diabetes mellitus       * insulin isophane human 100 UNIT/ML injection    HumuLIN N PEN    15 mL    Take 5 units before breakfast  And 5  "units before dinner    Gestational diabetes mellitus       insulin pen needle 31G X 5 MM     100 each    Use 2 pen needles daily or as directed.    Gestational diabetes mellitus       insulin syringe-needle U-100 31G X 15/64\" 0.3 ML     100 each    Use 2 syringes daily or as directed.    Gestational diabetes mellitus       ondansetron 4 MG ODT tab    ZOFRAN ODT    30 tablet    Take 1-2 tablets (4-8 mg) by mouth every 8 hours as needed for nausea    Nausea/vomiting in pregnancy       prenatal multivitamin plus iron 27-0.8 MG Tabs per tablet     100 tablet    Take 1 tablet by mouth daily    Pregnancy test positive       tenofovir 300 MG tablet    VIREAD    90 tablet    Take 1 tablet (300 mg) by mouth daily    Viral hepatitis B chronic (H)       * Notice:  This list has 4 medication(s) that are the same as other medications prescribed for you. Read the directions carefully, and ask your doctor or other care provider to review them with you.      "

## 2017-12-12 NOTE — PROGRESS NOTES
Gestational Diabetes Follow-up Visit    SUBJECTIVE/OBJECTIVE:  Reji Lockwood presents today for education and evaluation of glucose control related to gestational diabetes    She is accompanied by , Aliyah.     Patient's gestational diabetes management related comments/concerns: none    LMP 03/28/2017 (Within Weeks)    JESSICA: 01/15/18    Blood Glucose    Date Ketones Fasting Post Breakfast Post Lunch Post Supper   12/12  84 114     12/11  94 87 144 101   12/10  88 146 113 125   12/09  101 135 124 171   12/08  94 149 163 105   12/07  88 105 238 97   12/06  86 128 185 93     Current gestational diabetes management:    Taking medications for gestational diabetes? Yes: nph: 9u in AM, 10 u in PM    Physical Activity: none    Nutrition:  Patient is following recommended meal plan.    ASSESSMENT:  G2, P1, with jessica of 01/15/18. 36 weeks gestation. Normal, healthy weight gain. Starting to feel uncomfortable. Compliant with diabetes self-mgmt tasks. Seeing some increase in bg levels as due date draws near.     Health Beliefs and Attitudes:   Stage of Change: ACTION (Actively working towards change)    INTERVENTION:  Educational topics covered today:  Taking Medication: bg readings reviewed.     PLAN:  Check glucose 4 times daily.  Continue to follow recommended meal plan  Increase nph AM dose to 10 units, increase PM dose to 11 units.     Call diabetes educator is questions or concerns arise.      FOLLOW-UP:  F/u appt made with cde on 01/02. Cde to call pt on 12/20/17 for a bg report.     Time spent was 20 minutes - no charge  Encounter type: Individual    Shavon Luo RN, BSN, CDE   [facility]

## 2017-12-15 ENCOUNTER — ALLIED HEALTH/NURSE VISIT (OUTPATIENT)
Dept: NURSING | Facility: CLINIC | Age: 37
End: 2017-12-15
Payer: COMMERCIAL

## 2017-12-15 DIAGNOSIS — O34.32 CERVICAL INSUFFICIENCY DURING PREGNANCY, ANTEPARTUM, SECOND TRIMESTER: ICD-10-CM

## 2017-12-15 DIAGNOSIS — O09.92 SUPERVISION OF HIGH RISK PREGNANCY, ANTEPARTUM, SECOND TRIMESTER: ICD-10-CM

## 2017-12-15 DIAGNOSIS — O09.522 AMA (ADVANCED MATERNAL AGE) MULTIGRAVIDA 35+, SECOND TRIMESTER: ICD-10-CM

## 2017-12-15 PROCEDURE — 96372 THER/PROPH/DIAG INJ SC/IM: CPT

## 2017-12-15 PROCEDURE — 99207 ZZC NO CHARGE NURSE ONLY: CPT

## 2017-12-15 NOTE — MR AVS SNAPSHOT
After Visit Summary   12/15/2017    Reji Lockwood    MRN: [id number]           Patient Information     Date Of Birth          1980        Visit Information        Provider Department      12/15/2017 9:45 AM [place] [employer]; NURSE ONLY WOMENS [facility]        Today's Diagnoses     Cervical insufficiency during pregnancy, antepartum, second trimester        Supervision of high risk pregnancy, antepartum, second trimester        AMA (advanced maternal age) multigravida 35+, second trimester           Follow-ups after your visit        Your next 10 appointments already scheduled     Dec 19, 2017 11:15 AM CST   ESTABLISHED PRENATAL with Main Norwood MD, [facility] COLP[facility]LEEP [facility]OM   [facility]    [address] [facility] [id number] [phone]            Dec 20, 2017  9:30 AM CST   (Arrive by 9:15 AM)   US FETAL BIOPHYS PROFILE W/O NON STRESS TEST with BKUS1   [facility]    [address] [facility] [id number] [phone]           Please bring a list of your medicines (including vitamins, minerals and over-the-counter drugs). Also, tell your doctor about any allergies you may have. Wear comfortable clothes and leave your valuables at home.  If you re less than 20 weeks drink four 8-ounce glasses of fluid an hour before your exam. If you need to empty your bladder before your exam, try to release only a little urine. Then, drink another glass of fluid.  You may have up to two family members in the exam room. If you bring a small child, an adult must be there to care for him or her.  Please call the Imaging Department at your exam site with any questions.            Dec 20, 2017 10:15 AM CST   (Arrive by 10:00 AM)   US OB LIMITED ONE OR MORE FETUSES with BKUS1   [facility]     [address] [facility] [id number] [phone]           Please bring a list of your medicines (including vitamins, minerals and over-the-counter drugs). Also, tell your doctor about any allergies you may have. Wear comfortable clothes and leave your valuables at home.  If you re less than 20 weeks drink four 8-ounce glasses of fluid an hour before your exam. If you need to empty your bladder before your exam, try to release only a little urine. Then, drink another glass of fluid.  You may have up to two family members in the exam room. If you bring a small child, an adult must be there to care for him or her.  Please call the Imaging Department at your exam site with any questions.            Dec 20, 2017  1:00 PM CST   Telephone Visit with Mg Cde Provider   [facility] ([facility])    [address] [place] [phone] [phone]           Note: this is not an onsite visit; there is no need to come to the facility.            Jan 02, 2018 11:00 AM CST   Return Visit with Mg Cde Provider   [facility] ([facility])    [address] [place] [phone] [phone]            Mar 08, 2018 10:40 AM CST   Return Visit with Breana Hood MD   [facility])    [address] [place] [id number] [phone]              Who to contact     If you have questions or need follow up information about today's clinic visit or your schedule please contact [facility] directly at [phone].  Normal or non-critical lab and imaging results will be communicated to you by MyChart, letter or phone within 4 business days after the clinic has received the results. If you do not hear from us within 7 days, please contact the clinic through MyChart or phone. If you have a critical or abnormal lab result, we will notify you by phone as  "soon as possible.  Submit refill requests through Jumo or call your pharmacy and they will forward the refill request to us. Please allow 3 business days for your refill to be completed.          Additional Information About Your Visit        [employer] for Parents Information     Jumo lets you send messages to your doctor, view your test results, renew your prescriptions, schedule appointments and more. To sign up, go to [email or web address] . Click on \"Log in\" on the left side of the screen, which will take you to the Welcome page. Then click on \"Sign up Now\" on the right side of the page.     You will be asked to enter the access code listed below, as well as some personal information. Please follow the directions to create your username and password.     Your access code is: [email or web address]  Expires: 3/12/2018  3:31 PM     Your access code will  in 90 days. If you need help or a new code, please call your [place] clinic or [phone].        Care EveryWhere ID     This is your Care EveryWhere ID. This could be used by other organizations to access your [place] medical records  [id number]        Your Vitals Were     Last Period                   2017 (Within Weeks)            Blood Pressure from Last 3 Encounters:   17 122/70   17 110/68   17 103/70    Weight from Last 3 Encounters:   17 66.2 kg (146 lb)   17 65.8 kg (145 lb)   17 64.9 kg (143 lb)              We Performed the Following     HYDROXYPROGESTERONE      INJECTION INTRAMUSCULAR OR SUB-Q          Today's Medication Changes          These changes are accurate as of: 12/15/17 10:40 AM.  If you have any questions, ask your nurse or doctor.               These medicines have changed or have updated prescriptions.        Dose/Directions    * insulin  UNIT/ML injection   Commonly known as:  NovoLIN N VIAL   This may have changed:  Another medication with the same name was changed. Make sure you " understand how and when to take each.   Used for:  Gestational diabetes mellitus        5 units before breakfast and 5 units before dinner   Quantity:  10 mL   Refills:  3       * insulin isophane human 100 UNIT/ML injection   Commonly known as:  HumuLIN N PEN   This may have changed:  additional instructions   Used for:  Gestational diabetes mellitus        Take 5 units before breakfast  And 5 units before dinner   Quantity:  15 mL   Refills:  1       * Notice:  This list has 2 medication(s) that are the same as other medications prescribed for you. Read the directions carefully, and ask your doctor or other care provider to review them with you.             Primary Care Provider Office Phone # Fax #    Fadia Georgia Serrano [phone]       [address]  [place] [facility] [address]        Equal Access to Services     TERRENCE MCCORMICK : Hadii laurie mark hadasho Soomaali, waaxda luqadaha, qaybta kaalmada adeegyada, waxay idiin hayyanira daily . So [facility] [phone].    ATENCIÓN: Si habla español, tiene a gilliam disposición servicios gratuitos de asistencia lingüística. Llame al [phone].    We comply with applicable federal civil rights laws and [place] laws. We do not discriminate on the basis of race, color, national origin, age, disability, sex, sexual orientation, or gender identity.            Thank you!     Thank you for choosing [facility]  for your care. Our goal is always to provide you with excellent care. Hearing back from our patients is one way we can continue to improve our services. Please take a few minutes to complete the written survey that you may receive in the mail after your visit with us. Thank you!             Your Updated Medication List - Protect others around you: Learn how to safely use, store and throw away your medicines at www.disposemymeds.org.          This list is accurate as of: 12/15/17 10:40 AM.  Always use your most recent  med list.                   Brand Name Dispense Instructions for use Diagnosis    acetaminophen 325 MG tablet    TYLENOL    100 tablet    Take 2 tablets (650 mg) by mouth every 4 hours as needed for mild pain or fever    Cervical insufficiency during pregnancy, antepartum, second trimester       acetone (Urine) test Strp     25 each    Use one strip daily to check for urine ketones    Abnormal maternal glucose tolerance, antepartum       * blood glucose monitoring lancets     100 each    Use to test blood sugar 4 times daily or as directed.    Abnormal maternal glucose tolerance, antepartum       * blood glucose monitoring lancets     100 each    Use to test blood sugar 4 times daily or as directed.    Gestational diabetes mellitus       blood glucose monitoring meter device kit     1 kit    Use to test blood sugar 4 times daily or as directed.    Gestational diabetes mellitus (GDM), antepartum, gestational diabetes method of control unspecified       blood glucose monitoring test strip    no brand specified    200 strip    Use to test blood sugars 4 times daily or as directed    Abnormal maternal glucose tolerance, antepartum       docusate sodium 100 MG tablet    COLACE    60 tablet    Take 100 mg by mouth daily    Cervical insufficiency during pregnancy, antepartum, second trimester       hydroxyprogesterone caproate in oil 250 mg/mL intramuscular injection    LYDIA    5 mL    Inject 1 mL (250 mg) into the muscle every 7 days    Cervical insufficiency during pregnancy, antepartum, second trimester, Supervision of high risk pregnancy, antepartum, second trimester, History of  delivery, currently pregnant in second trimester       * insulin  UNIT/ML injection    NovoLIN N VIAL    10 mL    5 units before breakfast and 5 units before dinner    Gestational diabetes mellitus       * insulin isophane human 100 UNIT/ML injection    HumuLIN N PEN    15 mL    Take 5 units before breakfast  And 5 units before  "dinner    Gestational diabetes mellitus       insulin pen needle 31G X 5 MM     100 each    Use 2 pen needles daily or as directed.    Gestational diabetes mellitus       insulin syringe-needle U-100 31G X 15/64\" 0.3 ML     100 each    Use 2 syringes daily or as directed.    Gestational diabetes mellitus       ondansetron 4 MG ODT tab    ZOFRAN ODT    30 tablet    Take 1-2 tablets (4-8 mg) by mouth every 8 hours as needed for nausea    Nausea/vomiting in pregnancy       prenatal multivitamin plus iron 27-0.8 MG Tabs per tablet     100 tablet    Take 1 tablet by mouth daily    Pregnancy test positive       tenofovir 300 MG tablet    VIREAD    90 tablet    Take 1 tablet (300 mg) by mouth daily    Viral hepatitis B chronic (H)       * Notice:  This list has 4 medication(s) that are the same as other medications prescribed for you. Read the directions carefully, and ask your doctor or other care provider to review them with you.      "

## 2017-12-15 NOTE — NURSING NOTE
The following medication was given:     MEDICATION: Hydroxyprogesterone Caproate 250mg/mL in oil   ROUTE: IM  SITE: LUQ - Gluteus  DOSE: 1 mg  LOT #: [id number]   :  [employer]  EXPIRATION DATE:  02/2020  NDC#: 94235-599-06  Used Lakshmi from clinic stock. Patient was accompanied by . Patient had no complaints/concerns. Verified last injection was given in RUQ. Reminded patient of future appt with Dr. Norwood on 12-19-17. Patient tolerated injection without complaint. Patient ambulated out of clinic not in apparent distress. Ca Virgen RN, BAN

## 2017-12-19 ENCOUNTER — PRENATAL OFFICE VISIT (OUTPATIENT)
Dept: OBGYN | Facility: CLINIC | Age: 37
End: 2017-12-19
Payer: COMMERCIAL

## 2017-12-19 VITALS
SYSTOLIC BLOOD PRESSURE: 115 MMHG | TEMPERATURE: 97 F | WEIGHT: 147 LBS | DIASTOLIC BLOOD PRESSURE: 74 MMHG | BODY MASS INDEX: 30.2 KG/M2 | HEART RATE: 86 BPM

## 2017-12-19 DIAGNOSIS — O09.90 SUPERVISION OF HIGH RISK PREGNANCY, ANTEPARTUM: Primary | ICD-10-CM

## 2017-12-19 DIAGNOSIS — O34.30 CERVICAL INSUFFICIENCY DURING PREGNANCY, ANTEPARTUM: ICD-10-CM

## 2017-12-19 DIAGNOSIS — O24.414 GESTATIONAL DIABETES REQUIRING INSULIN: ICD-10-CM

## 2017-12-19 PROCEDURE — 99207 ZZC PRENATAL VISIT: CPT | Performed by: OBSTETRICS & GYNECOLOGY

## 2017-12-19 PROCEDURE — 87653 STREP B DNA AMP PROBE: CPT | Performed by: OBSTETRICS & GYNECOLOGY

## 2017-12-19 NOTE — NURSING NOTE
"Chief Complaint   Patient presents with     Prenatal Care     OBV 36w1d-Removal of cerclage       Initial /74 (BP Location: Left arm, Patient Position: Chair, Cuff Size: Adult Regular)  Pulse 86  Temp 97  F (36.1  C) (Oral)  Wt 147 lb (66.7 kg)  LMP 03/28/2017 (Within Weeks)  Breastfeeding? No  BMI 30.2 kg/m2 Estimated body mass index is 30.2 kg/(m^2) as calculated from the following:    Height as of 11/7/17: 4' 10.5\" (1.486 m).    Weight as of this encounter: 147 lb (66.7 kg).  Medication Reconciliation: complete   Sudhai DESHAUN Crump      "

## 2017-12-19 NOTE — MR AVS SNAPSHOT
After Visit Summary   12/19/2017    Reji Lockwood    MRN: [id number]           Patient Information     Date Of Birth          1980        Visit Information        Provider Department      12/19/2017 11:15 AM Main Norwood MD; [place] [employer]; [facility]        Today's Diagnoses     Gestational diabetes requiring insulin        Cervical insufficiency during pregnancy, antepartum        Supervision of high risk pregnancy, antepartum          Care Instructions                                                        If you have any questions regarding your visit, Please contact your care team.     [facility]: [phone]    [facility] CLINIC HOURS TELEPHONE NUMBER       Main Norwood M.D.      Cristal-      Ca-NAYA Mast-Medical Assistant       Monday-[place]  8:00a.m-4:45 p.m  Wednesday-[place] 8:00a.m-4:45 p.m.  Thursday-[place]  8:00a.m-4:45 p.m.  Friday-[place]  8:00a.m-4:45 p.m. [facility]  [address] [place] [address]  [phone] ask for [facility]  [phone] Fax  Imaging [phone]    [facility] Labor and Delivery  [address] [place] [address] [place] [address]  [phone]    [facility]  [address] [facility] [place] [address] [phone] ask [facility]  [phone] Fax  Imaging [phone]     Urgent Care locations:    [facility] Monday-Friday  5 pm - 9 pm  Saturday and Sunday   9 am - 5 pm    Monday-Friday   11 am - 9 pm  Saturday and Sunday   9 am - 5 pm   [phone]       If you need a medication refill, please contact your pharmacy. Please allow 3 business days for your refill to be completed.  As always, Thank you for trusting us with your healthcare needs!            Follow-ups after your visit        Your next 10 appointments already  scheduled     Dec 20, 2017  9:15 AM CST    FETAL BIOPHYS PROFILE W/O NON STRESS TEST with BKUS[facility]   [facility]    [address] [facility] [address]   [phone]           Please bring a list of your medicines (including vitamins, minerals and over-the-counter drugs). Also, tell your doctor about any allergies you may have. Wear comfortable clothes and leave your valuables at home.  If you re less than 20 weeks drink four 8-ounce glasses of fluid an hour before your exam. If you need to empty your bladder before your exam, try to release only a little urine. Then, drink another glass of fluid.  You may have up to two family members in the exam room. If you bring a small child, an adult must be there to care for him or her.  Please call the Imaging Department at your exam site with any questions.            Dec 20, 2017 10:15 AM CST   (Arrive by 10:00 AM)    OB LIMITED ONE OR MORE FETUSES with BKUS1   [facility]    [address] [facility] [address]   [phone]           Please bring a list of your medicines (including vitamins, minerals and over-the-counter drugs). Also, tell your doctor about any allergies you may have. Wear comfortable clothes and leave your valuables at home.  If you re less than 20 weeks drink four 8-ounce glasses of fluid an hour before your exam. If you need to empty your bladder before your exam, try to release only a little urine. Then, drink another glass of fluid.  You may have up to two family members in the exam room. If you bring a small child, an adult must be there to care for him or her.  Please call the Imaging Department at your exam site with any questions.            Dec 20, 2017  1:00 PM CST   Telephone Visit with Mg Benavidez Provider   [facility]    [address] [place] [address]  "[id number] [phone]           Note: this is not an onsite visit; there is no need to come to the facility.            2018 11:00 AM CST   Return Visit with Mg Cde Provider   [facility]    [address] [place] [id number] [phone]            Mar 08, 2018 10:40 AM CST   Return Visit with Breana Hood MD   [facility])    [address] [place] [id number] [phone]              Who to contact     If you have questions or need follow up information about today's clinic visit or your schedule please contact [facility] directly at [phone].  Normal or non-critical lab and imaging results will be communicated to you by MyChart, letter or phone within 4 business days after the clinic has received the results. If you do not hear from us within 7 days, please contact the clinic through MyChart or phone. If you have a critical or abnormal lab result, we will notify you by phone as soon as possible.  Submit refill requests through [employer] or call your pharmacy and they will forward the refill request to us. Please allow 3 business days for your refill to be completed.          Additional Information About Your Visit        MyChart Information     [employer] lets you send messages to your doctor, view your test results, renew your prescriptions, schedule appointments and more. To sign up, go to [email or web address] . Click on \"Log in\" on the left side of the screen, which will take you to the Welcome page. Then click on \"Sign up Now\" on the right side of the page.     You will be asked to enter the access code listed below, as well as some personal information. Please follow the directions to create your username and password.     Your access code is: [email or web address]  Expires: 3/12/2018  3:31 PM     Your access code will  in 90 days. If you need help or a " new code, please call your [place] clinic or [phone].        Care EveryWhere ID     This is your Care EveryWhere ID. This could be used by other organizations to access your [place] medical records  [id number]        Your Vitals Were     Pulse Temperature Last Period Breastfeeding? BMI (Body Mass Index)       86 97  F (36.1  C) (Oral) 03/28/2017 (Within Weeks) No 30.2 kg/m2        Blood Pressure from Last 3 Encounters:   12/19/17 115/74   12/08/17 122/70   11/29/17 110/68    Weight from Last 3 Encounters:   12/19/17 147 lb (66.7 kg)   12/08/17 146 lb (66.2 kg)   11/29/17 145 lb (65.8 kg)              We Performed the Following     US OB Limited One Or More Fetuses          Today's Medication Changes          These changes are accurate as of: 12/19/17 11:27 AM.  If you have any questions, ask your nurse or doctor.               These medicines have changed or have updated prescriptions.        Dose/Directions    * insulin  UNIT/ML injection   Commonly known as:  NovoLIN N VIAL   This may have changed:  Another medication with the same name was changed. Make sure you understand how and when to take each.   Used for:  Gestational diabetes mellitus   Changed by:  Shavon Luo        5 units before breakfast and 5 units before dinner   Quantity:  10 mL   Refills:  3       * insulin isophane human 100 UNIT/ML injection   Commonly known as:  HumuLIN N PEN   This may have changed:  additional instructions   Used for:  Gestational diabetes mellitus   Changed by:  Shavon Luo        Take 5 units before breakfast  And 5 units before dinner   Quantity:  15 mL   Refills:  1       * Notice:  This list has 2 medication(s) that are the same as other medications prescribed for you. Read the directions carefully, and ask your doctor or other care provider to review them with you.             Primary Care Provider Office Phone # Fax #    Fadia Serrano [phone] [address]  [facility] [address]        Equal Access to Services     TERRENCE MCCORMICK : Hadii aad ku hadjose antoniokisha Sopatali, waitzda luqadaha, qareginata katimangela atkinsestherangela, waxay idiin haygeetache mayamelybob daily . So [facility] [phone].    ATENCIÓN: Si habla español, tiene a gilliam disposición servicios gratuitos de asistencia lingüística. Llame al [phone].    We comply with applicable federal civil rights laws and [place] laws. We do not discriminate on the basis of race, color, national origin, age, disability, sex, sexual orientation, or gender identity.            Thank you!     Thank you for choosing [facility]  for your care. Our goal is always to provide you with excellent care. Hearing back from our patients is one way we can continue to improve our services. Please take a few minutes to complete the written survey that you may receive in the mail after your visit with us. Thank you!             Your Updated Medication List - Protect others around you: Learn how to safely use, store and throw away your medicines at www.disposemymeds.org.          This list is accurate as of: 12/19/17 11:27 AM.  Always use your most recent med list.                   Brand Name Dispense Instructions for use Diagnosis    acetaminophen 325 MG tablet    TYLENOL    100 tablet    Take 2 tablets (650 mg) by mouth every 4 hours as needed for mild pain or fever    Cervical insufficiency during pregnancy, antepartum, second trimester       acetone (Urine) test Strp     25 each    Use one strip daily to check for urine ketones    Abnormal maternal glucose tolerance, antepartum       * blood glucose monitoring lancets     100 each    Use to test blood sugar 4 times daily or as directed.    Abnormal maternal glucose tolerance, antepartum       * blood glucose monitoring lancets     100 each    Use to test blood sugar 4 times daily or as directed.    Gestational diabetes mellitus       blood glucose monitoring meter device kit      "1 kit    Use to test blood sugar 4 times daily or as directed.    Gestational diabetes mellitus (GDM), antepartum, gestational diabetes method of control unspecified       blood glucose monitoring test strip    no brand specified    200 strip    Use to test blood sugars 4 times daily or as directed    Abnormal maternal glucose tolerance, antepartum       docusate sodium 100 MG tablet    COLACE    60 tablet    Take 100 mg by mouth daily    Cervical insufficiency during pregnancy, antepartum, second trimester       hydroxyprogesterone caproate in oil 250 mg/mL intramuscular injection    LYDIA    5 mL    Inject 1 mL (250 mg) into the muscle every 7 days    Cervical insufficiency during pregnancy, antepartum, second trimester, Supervision of high risk pregnancy, antepartum, second trimester, History of  delivery, currently pregnant in second trimester       * insulin  UNIT/ML injection    NovoLIN N VIAL    10 mL    5 units before breakfast and 5 units before dinner    Gestational diabetes mellitus       * insulin isophane human 100 UNIT/ML injection    HumuLIN N PEN    15 mL    Take 5 units before breakfast  And 5 units before dinner    Gestational diabetes mellitus       insulin pen needle 31G X 5 MM     100 each    Use 2 pen needles daily or as directed.    Gestational diabetes mellitus       insulin syringe-needle U-100 31G X 15/64\" 0.3 ML     100 each    Use 2 syringes daily or as directed.    Gestational diabetes mellitus       ondansetron 4 MG ODT tab    ZOFRAN ODT    30 tablet    Take 1-2 tablets (4-8 mg) by mouth every 8 hours as needed for nausea    Nausea/vomiting in pregnancy       prenatal multivitamin plus iron 27-0.8 MG Tabs per tablet     100 tablet    Take 1 tablet by mouth daily    Pregnancy test positive       tenofovir 300 MG tablet    VIREAD    90 tablet    Take 1 tablet (300 mg) by mouth daily    Viral hepatitis B chronic (H)       * Notice:  This list has 4 medication(s) that are the " same as other medications prescribed for you. Read the directions carefully, and ask your doctor or other care provider to review them with you.

## 2017-12-19 NOTE — PROGRESS NOTES
No signif signs, symptoms or concerns. Glc levels stable. U/S is 12/20. Here with family and .   Procedure: A speculum was placed and the cervix and cerclage sutures exposed sufficiently and the two Ethilon sutures were cut under the knot and removed intact. There was limited bleeding. Instructions given.  Advice appropriate to gestational age reviewed including pertinent Checklist items. Discussed condition, tests and care plan including RBA. Problem list updated. NST next.  A/P:  Reji was seen today for prenatal care.    Diagnoses and all orders for this visit:    Supervision of high risk pregnancy, antepartum  -     US OB Limited One Or More Fetuses  -     Group B strep PCR    Gestational diabetes requiring insulin  -     US OB Limited One Or More Fetuses    Cervical insufficiency during pregnancy, antepartum        Main Norwood MD

## 2017-12-19 NOTE — PATIENT INSTRUCTIONS
If you have any questions regarding your visit, Please contact your care team.     [employer] [facility]: [phone]    [facility] CLINIC HOURS TELEPHONE NUMBER       ANN Zaragoza-      Brian Mast-Medical Assistant       Monday-[place]  8:00a.m-4:45 p.m  Wednesday-[place] 8:00a.m-4:45 p.m.  Thursday-[place]  8:00a.m-4:45 p.m.  Friday-[place]  8:00a.m-4:45 p.m. [facility]  [address] [place] [address]  [phone] ask [facility]  [phone] Fax  Imaging [phone]    [facility] Labor and Delivery  [address] [place] [address] [place] [address]  [phone]    [facility]  [address] [place] [address]  [phone] [facility]  [phone] Fax  Imaging [phone]     Urgent Care locations:    [place]        [place] Monday-Friday  5 pm - 9 pm  Saturday and Sunday   9 am - 5 pm    Monday-Friday   11 am - 9 pm  Saturday and Sunday   9 am - 5 pm   [phone]       If you need a medication refill, please contact your pharmacy. Please allow 3 business days for your refill to be completed.  As always, Thank you for trusting us with your healthcare needs!

## 2017-12-20 ENCOUNTER — TELEPHONE (OUTPATIENT)
Dept: ENDOCRINOLOGY | Facility: CLINIC | Age: 37
End: 2017-12-20

## 2017-12-20 ENCOUNTER — RADIANT APPOINTMENT (OUTPATIENT)
Dept: ULTRASOUND IMAGING | Facility: CLINIC | Age: 37
End: 2017-12-20
Attending: OBSTETRICS & GYNECOLOGY
Payer: COMMERCIAL

## 2017-12-20 ENCOUNTER — APPOINTMENT (OUTPATIENT)
Dept: NURSING | Facility: CLINIC | Age: 37
End: 2017-12-20
Payer: COMMERCIAL

## 2017-12-20 DIAGNOSIS — O24.414 GESTATIONAL DIABETES REQUIRING INSULIN: ICD-10-CM

## 2017-12-20 DIAGNOSIS — O09.90 SUPERVISION OF HIGH RISK PREGNANCY, ANTEPARTUM: ICD-10-CM

## 2017-12-20 LAB
GP B STREP DNA SPEC QL NAA+PROBE: NEGATIVE
SPECIMEN SOURCE: NORMAL

## 2017-12-20 PROCEDURE — 76816 OB US FOLLOW-UP PER FETUS: CPT

## 2017-12-20 PROCEDURE — 76819 FETAL BIOPHYS PROFIL W/O NST: CPT

## 2017-12-20 NOTE — TELEPHONE ENCOUNTER
Spoke with pt for a bg report. Gdm - taking nph - oscar 01/15/17. Testing bg fasting and 1 hr post meal.   12/20: 91/141  12/19: 89/174/207/135  12/18: 84/130/106/89  12/17: 99/139/138/105  Pt advised to increase nph: AM dose 10 to 11 units, PM dose 11 to 12 units. Pt states she went to ob earlier this week and was told her cervix has dilated to 2. Not sure she will be able to make our appt on Jan 2nd. Pt advised not to worry, I will keep her on my schedule and if she does not come in, I will call her to see how she is doing. Pt verbalized understanding.    Shavon Luo, RN, BSN, CDE   [facility]

## 2018-01-09 ENCOUNTER — TELEPHONE (OUTPATIENT)
Dept: ENDOCRINOLOGY | Facility: CLINIC | Age: 38
End: 2018-01-09

## 2018-01-09 NOTE — TELEPHONE ENCOUNTER
Left vm for pt asking her to call clinic.Gdm, taking nph insulin, oscar 01/15/18. Missed appt with me 01/02/18. Please call to advise if you have delivered baby and discuss bg levels.     Shavon Luo, RN, BSN, CDE   [facility]

## 2018-02-22 ENCOUNTER — PRENATAL OFFICE VISIT (OUTPATIENT)
Dept: OBGYN | Facility: CLINIC | Age: 38
End: 2018-02-22
Payer: COMMERCIAL

## 2018-02-22 VITALS
TEMPERATURE: 98.1 F | BODY MASS INDEX: 26.09 KG/M2 | HEART RATE: 60 BPM | DIASTOLIC BLOOD PRESSURE: 81 MMHG | WEIGHT: 127 LBS | SYSTOLIC BLOOD PRESSURE: 125 MMHG

## 2018-02-22 DIAGNOSIS — B18.1 CHRONIC VIRAL HEPATITIS B WITHOUT DELTA AGENT AND WITHOUT COMA (H): ICD-10-CM

## 2018-02-22 PROBLEM — O09.529 AMA (ADVANCED MATERNAL AGE) MULTIGRAVIDA 35+: Status: RESOLVED | Noted: 2017-06-10 | Resolved: 2018-02-22

## 2018-02-22 PROBLEM — O34.30 CERVICAL INSUFFICIENCY DURING PREGNANCY, ANTEPARTUM: Status: RESOLVED | Noted: 2017-09-11 | Resolved: 2018-02-22

## 2018-02-22 PROBLEM — O24.414 GESTATIONAL DIABETES REQUIRING INSULIN: Status: RESOLVED | Noted: 2017-11-11 | Resolved: 2018-02-22

## 2018-02-22 PROBLEM — O09.90 SUPERVISION OF HIGH RISK PREGNANCY, ANTEPARTUM: Status: RESOLVED | Noted: 2017-06-10 | Resolved: 2018-02-22

## 2018-02-22 PROCEDURE — 99207 ZZC POST PARTUM EXAM: CPT | Performed by: OBSTETRICS & GYNECOLOGY

## 2018-02-22 RX ORDER — TENOFOVIR DISOPROXIL FUMARATE 300 MG/1
300 TABLET, FILM COATED ORAL
COMMUNITY
End: 2018-02-22

## 2018-02-22 NOTE — LETTER
February 22, 2018      Reji Lockwood  [address] [place] [address]        To Whom It May Concern:    Reji Lockwood was seen in our clinic. She may return to work without restrictions on 2-.      Sincerely,        Main Norwood MD

## 2018-02-22 NOTE — MR AVS SNAPSHOT
After Visit Summary   2/22/2018    Reji Lockwood    MRN: [id number]           Patient Information     Date Of Birth          1980        Visit Information        Provider Department      2/22/2018 2:15 PM Main Norwood MD; [employer] [facility]        Care Instructions                                                        If you have any questions regarding your visit, Please contact your care team.     [facility]: [phone]    [facility] CLINIC HOURS TELEPHONE NUMBER       ANN Zaragoza-      Brian Mast-Medical Assistant       [facility]  8:00a.m-4:45 p.m  Wednesday-[place] 8:00a.m-4:45 p.m.  [facility]  8:00[facility].m-4:45 p.m.  [facility]  8:00a.m-4:45 p.m. [address] [place] [address]  [phone] ask [facility]  [phone] Fax  Imaging [phone]    [facility] Labor and Delivery  [address] [place] [address] Dr.  [place], [place] [address] [phone]    [facility]  [address]  [place], [place] [address] [phone] [facility]  [phone] Fax  Imaging [phone]     Urgent Care locations:    [facility] Monday-Friday  5 pm - 9 pm  Saturday and Sunday   9 am - 5 pm    Monday-Friday   11 am - 9 pm  Saturday and Sunday   9 am - 5 pm   [phone]       If you need a medication refill, please contact your pharmacy. Please allow 3 business days for your refill to be completed.  As always, Thank you for trusting us with your healthcare needs!            Follow-ups after your visit        Your next 10 appointments already scheduled     Mar 08, 2018 10:00 AM CST   LAB with LAB FIRST FLOOR [facility] ([facility])    [address] [place] [address]   [phone]           Please  "do not eat 10-12 hours before your appointment if you are coming in fasting for labs on lipids, cholesterol, or glucose (sugar). This does not apply to pregnant women. Water, hot tea and black coffee (with nothing added) are okay. Do not drink other fluids, diet soda or chew gum.            Mar 08, 2018 10:30 AM CST   Return Visit with Breana Hood MD   [facility]    [address] [place] [address] [phone]              Who to contact     If you have questions or need follow up information about today's clinic visit or your schedule please contact [facility] directly at [phone].  Normal or non-critical lab and imaging results will be communicated to you by MyChart, letter or phone within 4 business days after the clinic has received the results. If you do not hear from us within 7 days, please contact the clinic through MyChart or phone. If you have a critical or abnormal lab result, we will notify you by phone as soon as possible.  Submit refill requests through [employer] or call your pharmacy and they will forward the refill request to us. Please allow 3 business days for your refill to be completed.          Additional Information About Your Visit        [employer] Information     [employer] lets you send messages to your doctor, view your test results, renew your prescriptions, schedule appointments and more. To sign up, go to [email or web address] . Click on \"Log in\" on the left side of the screen, which will take you to the Welcome page. Then click on \"Sign up Now\" on the right side of the page.     You will be asked to enter the access code listed below, as well as some personal information. Please follow the directions to create your username and password.     Your access code is: [email or web address]  Expires: 3/12/2018  3:31 PM     Your access code will  in 90 days. If you need help or a new code, please call your " [facility] or [phone].        Care EveryWhere ID     This is your Care EveryWhere ID. This could be used by other organizations to access your [facility] medical records  [id number]        Your Vitals Were     Pulse Temperature Last Period Breastfeeding? BMI (Body Mass Index)       60 98.1  F (36.7  C) (Oral) 02/13/2018 No 26.09 kg/m2        Blood Pressure from Last 3 Encounters:   02/22/18 125/81   12/19/17 115/74   12/08/17 122/70    Weight from Last 3 Encounters:   02/22/18 127 lb (57.6 kg)   12/19/17 147 lb (66.7 kg)   12/08/17 146 lb (66.2 kg)              Today, you had the following     No orders found for display         Today's Medication Changes          These changes are accurate as of 2/22/18  2:22 PM.  If you have any questions, ask your nurse or doctor.               These medicines have changed or have updated prescriptions.        Dose/Directions    * insulin  UNIT/ML injection   Commonly known as:  NovoLIN N VIAL   This may have changed:  Another medication with the same name was changed. Make sure you understand how and when to take each.   Used for:  Gestational diabetes mellitus        5 units before breakfast and 5 units before dinner   Quantity:  10 mL   Refills:  3       * insulin isophane human 100 UNIT/ML injection   Commonly known as:  HumuLIN N PEN   This may have changed:  additional instructions   Used for:  Gestational diabetes mellitus        Take 5 units before breakfast  And 5 units before dinner   Quantity:  15 mL   Refills:  1       tenofovir 300 MG tablet   Commonly known as:  VIREAD   This may have changed:  Another medication with the same name was removed. Continue taking this medication, and follow the directions you see here.   Used for:  Viral hepatitis B chronic (H)        Dose:  300 mg   Take 1 tablet (300 mg) by mouth daily   Quantity:  90 tablet   Refills:  3       * Notice:  This list has 2 medication(s) that are the same as other medications prescribed  for you. Read the directions carefully, and ask your doctor or other care provider to review them with you.             Primary Care Provider Office Phone # Fax #    Fadia Georgia Serrano [phone]       [address]  [facility] [address]        Equal Access to Services     PASTOR MCCORMICK : Hadii aad ku hadasho Soomaali, waaxda luqadaha, qaybta kaalmada adeegyada, waxay idiin hayaan adeeg kharash la'aan . So [facility] [phone].    ATENCIÓN: Si habla español, tiene a gilliam disposición servicios gratuitos de asistencia lingüística. Llame al [phone].    We comply with applicable federal civil rights laws and [place] laws. We do not discriminate on the basis of race, color, national origin, age, disability, sex, sexual orientation, or gender identity.            Thank you!     Thank you for choosing [facility]  for your care. Our goal is always to provide you with excellent care. Hearing back from our patients is one way we can continue to improve our services. Please take a few minutes to complete the written survey that you may receive in the mail after your visit with us. Thank you!             Your Updated Medication List - Protect others around you: Learn how to safely use, store and throw away your medicines at www.disposemymeds.org.          This list is accurate as of 2/22/18  2:22 PM.  Always use your most recent med list.                   Brand Name Dispense Instructions for use Diagnosis    acetaminophen 325 MG tablet    TYLENOL    100 tablet    Take 2 tablets (650 mg) by mouth every 4 hours as needed for mild pain or fever    Cervical insufficiency during pregnancy, antepartum, second trimester       acetone (Urine) test Strp     25 each    Use one strip daily to check for urine ketones    Abnormal maternal glucose tolerance, antepartum       * blood glucose monitoring lancets     100 each    Use to test blood sugar 4 times daily or as directed.  "   Abnormal maternal glucose tolerance, antepartum       * blood glucose monitoring lancets     100 each    Use to test blood sugar 4 times daily or as directed.    Gestational diabetes mellitus       blood glucose monitoring meter device kit     1 kit    Use to test blood sugar 4 times daily or as directed.    Gestational diabetes mellitus (GDM), antepartum, gestational diabetes method of control unspecified       blood glucose monitoring test strip    no brand specified    200 strip    Use to test blood sugars 4 times daily or as directed    Abnormal maternal glucose tolerance, antepartum       docusate sodium 100 MG tablet    COLACE    60 tablet    Take 100 mg by mouth daily    Cervical insufficiency during pregnancy, antepartum, second trimester       hydroxyprogesterone caproate in oil 250 mg/mL intramuscular injection    LYDIA    5 mL    Inject 1 mL (250 mg) into the muscle every 7 days    Cervical insufficiency during pregnancy, antepartum, second trimester, Supervision of high risk pregnancy, antepartum, second trimester, History of  delivery, currently pregnant in second trimester       * insulin  UNIT/ML injection    NovoLIN N VIAL    10 mL    5 units before breakfast and 5 units before dinner    Gestational diabetes mellitus       * insulin isophane human 100 UNIT/ML injection    HumuLIN N PEN    15 mL    Take 5 units before breakfast  And 5 units before dinner    Gestational diabetes mellitus       insulin pen needle 31G X 5 MM     100 each    Use 2 pen needles daily or as directed.    Gestational diabetes mellitus       insulin syringe-needle U-100 31G X 15/64\" 0.3 ML     100 each    Use 2 syringes daily or as directed.    Gestational diabetes mellitus       ondansetron 4 MG ODT tab    ZOFRAN ODT    30 tablet    Take 1-2 tablets (4-8 mg) by mouth every 8 hours as needed for nausea    Nausea/vomiting in pregnancy       prenatal multivitamin plus iron 27-0.8 MG Tabs per tablet     100 " tablet    Take 1 tablet by mouth daily    Pregnancy test positive       tenofovir 300 MG tablet    VIREAD    90 tablet    Take 1 tablet (300 mg) by mouth daily    Viral hepatitis B chronic (H)       * Notice:  This list has 4 medication(s) that are the same as other medications prescribed for you. Read the directions carefully, and ask your doctor or other care provider to review them with you.

## 2018-02-22 NOTE — PATIENT INSTRUCTIONS
If you have any questions regarding your visit, Please contact your care team.     [place] [facility]: [phone]    [facility] CLINIC HOURS TELEPHONE NUMBER       ANN Zaragzoa-      Brian Mast-Medical Assistant       Monday-[place]  8:00a.m-4:45 p.m  Wednesday-[place] 8:00a.m-4:45 p.m.  Thursday-[place]  8:00a.m-4:45 p.m.  Friday-[place]  8:00a.m-4:45 p.m. [facility]  [address] [place] [address]  [phone] ask [facility]  [phone] Fax  Imaging [phone]    [facility] Labor [facility] Delivery  [address] [place] [address] [place] [address]  [phone]    [facility]  [address] [place] [address]  [phone] [facility]  [phone] Fax  Imaging [phone]     Urgent Care locations:    [place]        [place] Monday-Friday  5 pm - 9 pm  Saturday and Sunday   9 am - 5 pm    Monday-Friday   11 am - 9 pm  Saturday and Sunday   9 am - 5 pm   [phone]       If you need a medication refill, please contact your pharmacy. Please allow 3 business days for your refill to be completed.  As always, Thank you for trusting us with your healthcare needs!

## 2018-02-22 NOTE — NURSING NOTE
"Chief Complaint   Patient presents with     Post Partum Exam     DOD 12/27/17       Initial /81 (BP Location: Left arm, Patient Position: Chair, Cuff Size: Adult Regular)  Pulse 60  Temp 98.1  F (36.7  C) (Oral)  Wt 127 lb (57.6 kg)  LMP 02/13/2018  Breastfeeding? No  BMI 26.09 kg/m2 Estimated body mass index is 26.09 kg/(m^2) as calculated from the following:    Height as of 11/7/17: 4' 10.5\" (1.486 m).    Weight as of this encounter: 127 lb (57.6 kg).  Medication Reconciliation: complete   Kezia Crump CMA      "

## 2018-02-23 ASSESSMENT — PATIENT HEALTH QUESTIONNAIRE - PHQ9: SUM OF ALL RESPONSES TO PHQ QUESTIONS 1-9: 0

## 2018-02-23 NOTE — PROGRESS NOTES
Reji Lockwood is a 37 year old year old G 2 P 2 who is here today for a postpartum exam.    HPI:      Doing well and without signif sx or concerns except . Currently bottle feeding infant. Here today with . Infant doing well. Contraceptive method planned is condoms and long history of infertility. NSA since delivery. PP depression screening as noted. See PHQ-9. Score = 0.    Past medical, obstetrical, surgical, family and social history reviewed and as noted or updated in chart.     Allergies, meds and supplements are as noted or updated in chart.      ROS:     Systems reviewed include constitutional; breast;                 cardiac; respiratory; gastrointestinal; genitourinary;                                musculoskeletal; integumentary; psychological;                                hematologic/lymphatic and endocrine.                  These systems were negative for significant symptoms except                 for the following: none and see HPI.                                Exam:  VS as noted.                    Exam deferred.    Assessment: Postpartum exam    Plan and Recommendations: Symptoms, problems and concerns reviewed.  Discussed pregnancy, birth, future pregnancy plans, work plans and infant care issues.  Problem list updated and Pregnancy Episode closed. See orders. RTC in 6 months here or FP for annual exam and Pap/HRHPV due then.  Has GI follow-up for hep B.     Reji was seen today for post partum exam.    Diagnoses and all orders for this visit:    Routine postpartum follow-up  -     GLUCOSE FASTING 75 GM; Future        Main Norwood MD

## 2018-03-06 ENCOUNTER — DOCUMENTATION ONLY (OUTPATIENT)
Dept: LAB | Facility: CLINIC | Age: 38
End: 2018-03-06

## 2018-03-06 NOTE — PROGRESS NOTES
Labs in chart are not expected until 4/16. Please order labs for 3/8 appointment or change expected date.    Thank you,  Donna PRESSLEY

## 2018-03-08 ENCOUNTER — OFFICE VISIT (OUTPATIENT)
Dept: GASTROENTEROLOGY | Facility: CLINIC | Age: 38
End: 2018-03-08
Payer: COMMERCIAL

## 2018-03-08 VITALS
BODY MASS INDEX: 26.85 KG/M2 | SYSTOLIC BLOOD PRESSURE: 122 MMHG | OXYGEN SATURATION: 100 % | DIASTOLIC BLOOD PRESSURE: 76 MMHG | HEIGHT: 58 IN | HEART RATE: 60 BPM | WEIGHT: 127.9 LBS

## 2018-03-08 DIAGNOSIS — B18.1 CHRONIC VIRAL HEPATITIS B WITHOUT DELTA AGENT AND WITHOUT COMA (H): Primary | ICD-10-CM

## 2018-03-08 DIAGNOSIS — B18.1 VIRAL HEPATITIS B CHRONIC (H): ICD-10-CM

## 2018-03-08 LAB
ALBUMIN SERPL-MCNC: 3.7 G/DL (ref 3.4–5)
ALP SERPL-CCNC: 88 U/L (ref 40–150)
ALT SERPL W P-5'-P-CCNC: 23 U/L (ref 0–50)
ANION GAP SERPL CALCULATED.3IONS-SCNC: 6 MMOL/L (ref 3–14)
AST SERPL W P-5'-P-CCNC: 14 U/L (ref 0–45)
BILIRUB DIRECT SERPL-MCNC: <0.1 MG/DL (ref 0–0.2)
BILIRUB SERPL-MCNC: 0.4 MG/DL (ref 0.2–1.3)
BUN SERPL-MCNC: 17 MG/DL (ref 7–30)
CALCIUM SERPL-MCNC: 9.1 MG/DL (ref 8.5–10.1)
CHLORIDE SERPL-SCNC: 103 MMOL/L (ref 94–109)
CO2 SERPL-SCNC: 30 MMOL/L (ref 20–32)
CREAT SERPL-MCNC: 0.68 MG/DL (ref 0.52–1.04)
ERYTHROCYTE [DISTWIDTH] IN BLOOD BY AUTOMATED COUNT: 12.2 % (ref 10–15)
GFR SERPL CREATININE-BSD FRML MDRD: >90 ML/MIN/1.7M2
GLUCOSE 2H P 75 G GLC PO SERPL-MCNC: 272 MG/DL (ref 60–200)
GLUCOSE BLDC GLUCOMTR-MCNC: 111 MG/DL (ref 70–99)
GLUCOSE PRE 75 G GLC PO SERPL-MCNC: 107 MG/DL (ref 60–126)
GLUCOSE SERPL-MCNC: 106 MG/DL (ref 70–99)
HCT VFR BLD AUTO: 44.8 % (ref 35–47)
HGB BLD-MCNC: 14.5 G/DL (ref 11.7–15.7)
INR PPP: 0.95 (ref 0.86–1.14)
MCH RBC QN AUTO: 28.5 PG (ref 26.5–33)
MCHC RBC AUTO-ENTMCNC: 32.4 G/DL (ref 31.5–36.5)
MCV RBC AUTO: 88 FL (ref 78–100)
PLATELET # BLD AUTO: 214 10E9/L (ref 150–450)
POTASSIUM SERPL-SCNC: 4 MMOL/L (ref 3.4–5.3)
PROT SERPL-MCNC: 7.6 G/DL (ref 6.8–8.8)
RBC # BLD AUTO: 5.09 10E12/L (ref 3.8–5.2)
SODIUM SERPL-SCNC: 139 MMOL/L (ref 133–144)
WBC # BLD AUTO: 6.1 10E9/L (ref 4–11)

## 2018-03-08 PROCEDURE — 80048 BASIC METABOLIC PNL TOTAL CA: CPT | Performed by: INTERNAL MEDICINE

## 2018-03-08 PROCEDURE — 36415 COLL VENOUS BLD VENIPUNCTURE: CPT | Performed by: INTERNAL MEDICINE

## 2018-03-08 PROCEDURE — 80076 HEPATIC FUNCTION PANEL: CPT | Performed by: INTERNAL MEDICINE

## 2018-03-08 PROCEDURE — 85027 COMPLETE CBC AUTOMATED: CPT | Performed by: INTERNAL MEDICINE

## 2018-03-08 PROCEDURE — 82950 GLUCOSE TEST: CPT | Performed by: OBSTETRICS & GYNECOLOGY

## 2018-03-08 PROCEDURE — 87517 HEPATITIS B DNA QUANT: CPT | Performed by: INTERNAL MEDICINE

## 2018-03-08 PROCEDURE — 82947 ASSAY GLUCOSE BLOOD QUANT: CPT | Performed by: OBSTETRICS & GYNECOLOGY

## 2018-03-08 PROCEDURE — 99213 OFFICE O/P EST LOW 20 MIN: CPT | Performed by: INTERNAL MEDICINE

## 2018-03-08 PROCEDURE — 85610 PROTHROMBIN TIME: CPT | Performed by: INTERNAL MEDICINE

## 2018-03-08 ASSESSMENT — PAIN SCALES - GENERAL: PAINLEVEL: NO PAIN (0)

## 2018-03-08 NOTE — LETTER
Ms.Seng Lockwood  [address] [place] [address]        March 29, 2018    Dear ,    We are writing to inform you of your test results.  As we expected, the virus level is increasing a bit, however, the liver enzymes and function remain stable.   No change to the plan, will await next labs.   Please contact us immediately if there is any concern about liver health: RUQ pain, jaundice, worsening weakness, fatigue.    Neyda Birmingham RN, BSN  [facility]  GI/Gen Surg/Hepatology Care Coordinator  [phone]    Resulted Orders   CBC with platelets   Result Value Ref Range    WBC 6.1 4.0 - 11.0 10e9/L    RBC Count 5.09 3.8 - 5.2 10e12/L    Hemoglobin 14.5 11.7 - 15.7 g/dL    Hematocrit 44.8 35.0 - 47.0 %    MCV 88 78 - 100 fl    MCH 28.5 26.5 - 33.0 pg    MCHC 32.4 31.5 - 36.5 g/dL    RDW 12.2 10.0 - 15.0 %    Platelet Count 214 150 - 450 10e9/L   Hepatic panel (Albumin, ALT, AST, Bili, Alk Phos, TP)   Result Value Ref Range    Bilirubin Direct <0.1 0.0 - 0.2 mg/dL    Bilirubin Total 0.4 0.2 - 1.3 mg/dL    Albumin 3.7 3.4 - 5.0 g/dL    Protein Total 7.6 6.8 - 8.8 g/dL    Alkaline Phosphatase 88 40 - 150 U/L    ALT 23 0 - 50 U/L    AST 14 0 - 45 U/L   INR   Result Value Ref Range    INR 0.95 0.86 - 1.14   Hep B Virus DNA Quant Real Time PCR   Result Value Ref Range    HEP B Virus DNA Quant 6891721 (A) HBVND^HBV DNA Not Detected [IU]/mL      Comment:      The ABEBE AmpliPrep/ABEBE TaqMan HBV Test is an FDA-approved in vitro nucleic   acid amplification test for the quantitation of HBV DNA in human plasma (EDTA   plasma) or serum using the ABEBE AmpliPrep Instrument for automated viral   nucleic acid extraction and the ABEBE TaqMan Analyzer or [facility] TaqMan for the   automated Real Time PCR amplification and detection of viral nucleic acid   target.  Titer results are reported in International Units/mL (IU/mL) using the 1st WHO   International standard for HBV for Nucleic Acid  Amplification based assays.      HEP B Virus DNA Quant Log 6.4 (H) <1.3 [Log_IU]/mL   Basic metabolic panel  (Ca, Cl, CO2, Creat, Gluc, K, Na, BUN)   Result Value Ref Range    Sodium 139 133 - 144 mmol/L    Potassium 4.0 3.4 - 5.3 mmol/L    Chloride 103 94 - 109 mmol/L    Carbon Dioxide 30 20 - 32 mmol/L    Anion Gap 6 3 - 14 mmol/L    Glucose 106 (H) 70 - 99 mg/dL      Comment:      Fasting specimen    Urea Nitrogen 17 7 - 30 mg/dL    Creatinine 0.68 0.52 - 1.04 mg/dL    GFR Estimate >90 >60 mL/min/1.7m2      Comment:      Non  GFR Calc    GFR Estimate If Black >90 >60 mL/min/1.7m2      Comment:       GFR Calc    Calcium 9.1 8.5 - 10.1 mg/dL

## 2018-03-08 NOTE — NURSING NOTE
"Reji Lockwood's goals for this visit include:   Chief Complaint   Patient presents with     RECHECK     3 month f/u       She requests these members of her care team be copied on today's visit information: yes    PCP: Fadia Sauceda    Referring Provider:  No referring provider defined for this encounter.    Chief Complaint   Patient presents with     RECHECK     3 month f/u       Initial /76 (BP Location: Left arm, Patient Position: Sitting, Cuff Size: Adult Regular)  Pulse 60  Ht 1.473 m (4' 10\")  Wt 58 kg (127 lb 14.4 oz)  LMP 02/13/2018  SpO2 100%  BMI 26.73 kg/m2 Estimated body mass index is 26.73 kg/(m^2) as calculated from the following:    Height as of this encounter: 1.473 m (4' 10\").    Weight as of this encounter: 58 kg (127 lb 14.4 oz).  Medication Reconciliation: complete    Do you need any medication refills at today's visit? No    "

## 2018-03-08 NOTE — PROGRESS NOTES
Hepatology Clinic note  Reji Lockwood   Date of Birth 1980  Date of Service 3/8/2018         Impression/plan:   Reji Lockwood is a 37 year old woman with chronic HBV, immune tolerant with NO signs of advanced liver disease, who presents for follow up.     She's doing well, and has normal liver enzymes and synthetic function.   She's been o   checking liver panel monthly x 2 months to monitor for possible reactivation of HBV.   If signs of flare up or hepatitis then would restart Tenofovir, if stable liver despite high viral load then will simply monitor annually.     RTC in 1 year or sooner as needed    Breana Hood MD  [facility] Transplant Hepatology clinic    -----------------------------------------------------       HPI:   Reji Lockwood is a 37 year old female with chronic HBV, immune tolerant with NO signs of advanced liver disease, who presents for follow up.     Please refer to prior clinic note for details of initial presentation.   Since last visit, she delivered a healthy girl on 12/27/2017. There were no complications during pregnancy or after delivery.    She has been on Viread for duration of pregnancy, however, ran out of it in 1/2018, and did not get refills thinking that was the needed duration.     Currently she's doing well, and is back to work.   Otherwise, denies chest pain, shortness of breath, abdominal pain, nausea, vomiting fevers, chills, bleeding, jaundice, confusion, falls, rash, pruritis, leg swelling or abdominal distention. Has stable weight, appetite and bowel habits.         Past Medical History:     Past Medical History:   Diagnosis Date     Chronic viral hepatitis B without delta agent and without coma (H) 2010            Past Surgical History:     Past Surgical History:   Procedure Laterality Date     CERCLAGE CERVICAL N/A 9/11/2017    Procedure: CERCLAGE CERVICAL;  Cervical Cerclage ;  Surgeon: Ez Erazo MD;  [facility]            Medications:     Current  "Outpatient Prescriptions   Medication     tenofovir (VIREAD) 300 MG tablet     No current facility-administered medications for this visit.             Allergies:     Allergies   Allergen Reactions     Blood Transfusion Related (Informational Only) Other (See Comments)     Patient has a history of a clinically significant antibody against RBC antigens.  A delay in compatible RBCs may occur.     Fish Allergy Hives            Social History:     Social History     Social History     Marital status:      Spouse name: gerardo Pozo     Number of children: 2     Years of education: N/A     Occupational History           Social History Main Topics     Smoking status: Never Smoker     Smokeless tobacco: Never Used     Alcohol use No     Drug use: No     Sexual activity: Yes     Partners: Male     Birth control/ protection: None     Other Topics Concern     Not on file     Social History Narrative    From [place] to [place] to [place] in [place].     H[place].             Family History:     Family History   Problem Relation Age of Onset     KIDNEY DISEASE Mother      Unknown/Adopted Father               Review of Systems:   10 points ROS was obtained and highlighted in the HPI, otherwise negative.          Physical Exam:   VS:  /76 (BP Location: Left arm, Patient Position: Sitting, Cuff Size: Adult Regular)  Pulse 60  Ht 1.473 m (4' 10\")  Wt 58 kg (127 lb 14.4 oz)  LMP 02/13/2018  SpO2 100%  BMI 26.73 kg/m2      Gen: A&Ox3, NAD  HEENT: non-icteric, oropharynx clear  CV: RRR  Lung: CTAB  Abd: soft, NT, ND    Ext: no edema, intact pulses.   Skin: no stigmata of chronic liver disease.   Neuro: no focal deficits, grossly intact, no asterixis   Psych: appropriate mood and affects         Data:   Reviewed in person and significant for:  Lab Results   Component Value Date    WBC 6.1 03/08/2018     Lab Results   Component Value Date    RBC 5.09 03/08/2018     Lab Results   Component Value Date    HGB 14.5 " 03/08/2018     Lab Results   Component Value Date    HCT 44.8 03/08/2018     No components found for: MCT  Lab Results   Component Value Date    MCV 88 03/08/2018     Lab Results   Component Value Date    MCH 28.5 03/08/2018     Lab Results   Component Value Date    MCHC 32.4 03/08/2018     Lab Results   Component Value Date    RDW 12.2 03/08/2018     Lab Results   Component Value Date     03/08/2018     Last Basic Metabolic Panel:  Lab Results   Component Value Date     03/08/2018      Lab Results   Component Value Date    POTASSIUM 4.0 03/08/2018     Lab Results   Component Value Date    CHLORIDE 103 03/08/2018     Lab Results   Component Value Date    DIANNE 9.1 03/08/2018     Lab Results   Component Value Date    CO2 30 03/08/2018     Lab Results   Component Value Date    BUN 17 03/08/2018     Lab Results   Component Value Date    CR 0.68 03/08/2018     Lab Results   Component Value Date     03/08/2018     Liver Function Studies -   Recent Labs   Lab Test  03/08/18   1000   PROTTOTAL  7.6   ALBUMIN  3.7   BILITOTAL  0.4   ALKPHOS  88   AST  14   ALT  23

## 2018-03-08 NOTE — MR AVS SNAPSHOT
After Visit Summary   3/8/2018    Reji Lockwood    MRN: [id number]           Patient Information     Date Of Birth          1980        Visit Information        Provider Department      3/8/2018 10:30 AM Breana Hood MD; [employer] [facility]        Today's Diagnoses     Chronic viral hepatitis B without delta agent and without coma (H)    -  1       Follow-ups after your visit        Your next 10 appointments already scheduled     Apr 05, 2018  3:15 PM CDT   LAB with BK LAB   [facility] ([facility])    [address] [address]   [phone]           Please do not eat 10-12 hours before your appointment if you are coming in fasting for labs on lipids, cholesterol, or glucose (sugar). This does not apply to pregnant women. Water, hot tea and black coffee (with nothing added) are okay. Do not drink other fluids, diet soda or chew gum.            May 03, 2018  3:15 PM CDT   LAB with BK LAB   [facility] ([facility]    [address] [address]   [phone]           Please do not eat 10-12 hours before your appointment if you are coming in fasting for labs on lipids, cholesterol, or glucose (sugar). This does not apply to pregnant women. Water, hot tea and black coffee (with nothing added) are okay. Do not drink other fluids, diet soda or chew gum.              Future tests that were ordered for you today     Open Standing Orders        Priority Remaining Interval Expires Ordered    Hepatic panel (Albumin, ALT, AST, Bili, Alk Phos, TP) Routine 2/2 monthly  9/8/2018 3/8/2018    Hep B Virus DNA Quant Real Time PCR Routine 2/2 monthly  9/8/2018 3/8/2018            Who to contact     If you have questions or need follow up information about today's clinic visit or your schedule please contact [facility] directly at  "[phone].  Normal or non-critical lab and imaging results will be communicated to you by [employer], letter or phone within 4 business days after the clinic has received the results. If you do not hear from us within 7 days, please contact the clinic through [employer] or phone. If you have a critical or abnormal lab result, we will notify you by phone as soon as possible.  Submit refill requests through [employer] or call your pharmacy and they will forward the refill request to us. Please allow 3 business days for your refill to be completed.          Additional Information About Your Visit        [employer] Information     [employer] is an electronic gateway that provides easy, online access to your medical records. With [employer], you can request a clinic appointment, read your test results, renew a prescription or communicate with your care team.     To sign up for [employer] visit the website at [email or web address]   You will be asked to enter the access code listed below, as well as some personal information. Please follow the directions to create your username and password.     Your access code is: [email or web address]  Expires: 3/12/2018  3:31 PM     Your access code will  in 90 days. If you need help or a new code, please contact your [facility] or call [phone] for assistance.        Care EveryWhere ID     This is your Care EveryWhere ID. This could be used by other organizations to access your [facility] medical records  [id number]        Your Vitals Were     Pulse Height Last Period Pulse Oximetry BMI (Body Mass Index)       60 1.473 m (4' 10\") 2018 100% 26.73 kg/m2        Blood Pressure from Last 3 Encounters:   18 122/76   18 125/81   17 115/74    Weight from Last 3 Encounters:   18 58 kg (127 lb 14.4 oz)   18 57.6 kg (127 lb)   17 66.7 kg (147 lb)              We Performed the Following     Glucose by meter          Today's Medication " Changes          These changes are accurate as of 3/8/18 11:06 AM.  If you have any questions, ask your nurse or doctor.               Stop taking these medicines if you haven't already. Please contact your care team if you have questions.     tenofovir 300 MG tablet   Commonly known as:  VIREAD   Stopped by:  Breana Hood MD                    Primary Care Provider Office Phone # Fax #    Fadia Ho Ubaldo Serrano [phone]       [address]  [facility] [address]        Equal Access to Services     [facility]: Hadii aad ku hadasho Soomaali, waaxda luqadaha, qaybta kaalmada adeegyada, waxay bekain hayyanira daily . So [facility] [phone].    ATENCIÓN: Si habla español, tiene a gilliam disposición servicios gratuitos de asistencia lingüística. [facility] [phone].    We comply with applicable federal civil rights laws and [place] laws. We do not discriminate on the basis of race, color, national origin, age, disability, sex, sexual orientation, or gender identity.            Thank you!     Thank you for choosing [facility]  for your care. Our goal is always to provide you with excellent care. Hearing back from our patients is one way we can continue to improve our services. Please take a few minutes to complete the written survey that you may receive in the mail after your visit with us. Thank you!             Your Updated Medication List - Protect others around you: Learn how to safely use, store and throw away your medicines at www.disposemymeds.org.      Notice  As of 3/8/2018 11:06 AM    You have not been prescribed any medications.

## 2018-03-09 LAB
HBV DNA SERPL NAA+PROBE-ACNC: ABNORMAL [IU]/ML
HBV DNA SERPL NAA+PROBE-LOG IU: 6.4 {LOG_IU}/ML

## 2018-03-13 ENCOUNTER — TELEPHONE (OUTPATIENT)
Dept: OBGYN | Facility: CLINIC | Age: 38
End: 2018-03-13

## 2018-03-13 NOTE — LETTER
[address] [address]  Phone: [phone]    03/23/18    Reji Lockwood  [address] [place] [address]      Reji,    I have tried to contact you two times to give you lab results from earlier this month and have left you messages.    Dr. Norwood wanted you to know that your sugar test came back high which means that you have diabetes. Dr. Norwood wants you to be seen by family practice to get a treatment plan. 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     Please call the [phone] to receive assistance with scheduling an appointment with one of the family practice providers at [facility].     Sincerely,      NAYA Penaloza, CARA Norwood MD

## 2018-03-13 NOTE — TELEPHONE ENCOUNTER
Notes Recorded by Main Norwood MD on 3/10/2018 at 4:20 PM  This shows type 2 diabetes. Advise follow-up medical management with FP, IM, or Endocrinology. May also review with diabetic education to follow diet and home glucose monitoring long term    Phone call placed to patient using [employer], client # [id number]. No Authorization to Discuss Protected Health Information on file.  left message for patient to call back to clinic for information. Ca Virgen RN, BAN

## 2018-03-23 NOTE — TELEPHONE ENCOUNTER
Phone call placed to patient using [employer], client Pham NORRIS. No Authorization to Discuss Protected Health Information on file.  left a voice message to call back to clinic.   Letter sent to patient.  Will route to Dr. Norwood as an FYI. Ca Virgen RN, BAN

## 2018-03-27 ENCOUNTER — TELEPHONE (OUTPATIENT)
Dept: OBGYN | Facility: CLINIC | Age: 38
End: 2018-03-27

## 2018-03-27 NOTE — TELEPHONE ENCOUNTER
[facility] Call Center    Phone Message    May a detailed message be left on voicemail: yes    Reason for Call: Patient returning Ca's call requesting a call back    Action Taken: Message routed to:  [facility] p [phone]

## 2018-03-27 NOTE — TELEPHONE ENCOUNTER
Noted no future appt with family practice.  Return call to patient. She stated she had not received letter in the mail yet. She is returning calls from earlier as she is off today. Explained Dr. Norwood's orders that she needs to be seen for DM. Offered a couple of times to assist patient in scheduling at [facility]. Patient declined and stated she will call back later to schedule. Patient appreciative of phone calls. Ca Virgen RN, BAN

## 2018-04-05 DIAGNOSIS — B18.1 CHRONIC VIRAL HEPATITIS B WITHOUT DELTA AGENT AND WITHOUT COMA (H): ICD-10-CM

## 2018-04-05 LAB
ALBUMIN SERPL-MCNC: 3.7 G/DL (ref 3.4–5)
ALP SERPL-CCNC: 72 U/L (ref 40–150)
ALT SERPL W P-5'-P-CCNC: 25 U/L (ref 0–50)
AST SERPL W P-5'-P-CCNC: 16 U/L (ref 0–45)
BILIRUB DIRECT SERPL-MCNC: 0.1 MG/DL (ref 0–0.2)
BILIRUB SERPL-MCNC: 0.6 MG/DL (ref 0.2–1.3)
PROT SERPL-MCNC: 7.5 G/DL (ref 6.8–8.8)

## 2018-04-05 PROCEDURE — 87517 HEPATITIS B DNA QUANT: CPT | Performed by: INTERNAL MEDICINE

## 2018-04-05 PROCEDURE — 80076 HEPATIC FUNCTION PANEL: CPT | Performed by: INTERNAL MEDICINE

## 2018-04-05 PROCEDURE — 36415 COLL VENOUS BLD VENIPUNCTURE: CPT | Performed by: INTERNAL MEDICINE

## 2018-04-06 LAB
HBV DNA SERPL NAA+PROBE-ACNC: ABNORMAL [IU]/ML
HBV DNA SERPL NAA+PROBE-LOG IU: >8.2 {LOG_IU}/ML

## 2018-04-18 ENCOUNTER — TELEPHONE (OUTPATIENT)
Dept: GASTROENTEROLOGY | Facility: CLINIC | Age: 38
End: 2018-04-18

## 2018-04-18 DIAGNOSIS — B18.1 CHRONIC VIRAL HEPATITIS B WITHOUT DELTA AGENT AND WITHOUT COMA (H): Primary | ICD-10-CM

## 2018-04-18 NOTE — TELEPHONE ENCOUNTER
Breana Hood MD Frischmon, Amanda R, RN                   The virus has again gone to pre-pregnancy levels (and pre-tenofovir), but thankfully the liver enzymes are still normal.   So slight modification of the plan.   Instead of checking both liver panel and HBV DNA every month:   - repeat liver panel every two weeks for 6 times (~ 3 months total).   - no need to repeat HBV DNA with the above.     Thanks       Nurse called patient with  and left message to return call to go over recommendations.    Neyda Birmingham RN, BSN, PHN  [facility]  GI/Gen Surg/Hepatology Care Coordinator

## 2018-04-18 NOTE — LETTER
Patient:  Reji Lockwood  :   1980  MRN:     [id number]        Ms.Seng Lockwood  [address] [place] [address]        2018    Dear ,    We have been attempting to reach you by phone regarding lab results.    The virus has again gone to pre-pregnancy levels (and pre-tenofovir), but thankfully your liver enzymes are still normal. So slight modification of the plan.   Instead of checking both liver panel and HBV DNA every month we would like to to have blood work (repeat liver panel) every two weeks for 6 times (3 months total). Please call [phone] to schedule these lab appointments.    Resulted Orders   Hepatic panel (Albumin, ALT, AST, Bili, Alk Phos, TP)   Result Value Ref Range    Bilirubin Direct 0.1 0.0 - 0.2 mg/dL    Bilirubin Total 0.6 0.2 - 1.3 mg/dL    Albumin 3.7 3.4 - 5.0 g/dL    Protein Total 7.5 6.8 - 8.8 g/dL    Alkaline Phosphatase 72 40 - 150 U/L    ALT 25 0 - 50 U/L    AST 16 0 - 45 U/L   Hep B Virus DNA Quant Real Time PCR   Result Value Ref Range    HEP B Virus DNA Quant >[id number] (A) HBVND^HBV DNA Not Detected [IU]/mL      Comment:      Greater than 170,000,000 HBV DNA IU/mL  The ABEBE AmpliPrep/ABEBE TaqMan HBV Test is an FDA-approved in vitro nucleic   acid amplification test for the quantitation of HBV DNA in human plasma (EDTA   plasma) or serum using the ABEBE AmpliPrep Instrument for automated viral   nucleic acid extraction and the ABEBE TaqMan Analyzer or FloQast TaqMan for the   automated Real Time PCR amplification and detection of viral nucleic acid   target.  Titer results are reported in International Units/mL (IU/mL) using the 1st WHO   International standard for HBV for Nucleic Acid Amplification based assays.      HEP B Virus DNA Quant Log >8.2 (H) <1.3 [Log_IU]/mL

## 2018-04-20 NOTE — TELEPHONE ENCOUNTER
Nurse called patient again with  and left a message to return call.    Neyda Birmingham RN, BSN, PHN  [facility]  GI/Gen Surg/Hepatology Care Coordinator

## 2018-04-25 NOTE — TELEPHONE ENCOUNTER
With the help of an  attempted to contact patient by phone. No answer, message left.    Marilee Basilio CMA

## 2018-04-27 NOTE — TELEPHONE ENCOUNTER
Again attempted to contact the patient by phone with the help of an  #[phone]. No answer so a voicemail was left. Letter also sent.    Marilee Basilio CMA

## 2018-05-03 DIAGNOSIS — B18.1 CHRONIC VIRAL HEPATITIS B WITHOUT DELTA AGENT AND WITHOUT COMA (H): ICD-10-CM

## 2018-05-03 LAB
ALBUMIN SERPL-MCNC: 3.5 G/DL (ref 3.4–5)
ALP SERPL-CCNC: 74 U/L (ref 40–150)
ALT SERPL W P-5'-P-CCNC: 29 U/L (ref 0–50)
AST SERPL W P-5'-P-CCNC: 18 U/L (ref 0–45)
BILIRUB DIRECT SERPL-MCNC: 0.1 MG/DL (ref 0–0.2)
BILIRUB SERPL-MCNC: 0.5 MG/DL (ref 0.2–1.3)
PROT SERPL-MCNC: 7.3 G/DL (ref 6.8–8.8)

## 2018-05-03 PROCEDURE — 87517 HEPATITIS B DNA QUANT: CPT | Performed by: INTERNAL MEDICINE

## 2018-05-03 PROCEDURE — 36415 COLL VENOUS BLD VENIPUNCTURE: CPT | Performed by: INTERNAL MEDICINE

## 2018-05-03 PROCEDURE — 80076 HEPATIC FUNCTION PANEL: CPT | Performed by: INTERNAL MEDICINE

## 2018-05-05 LAB
HBV DNA SERPL NAA+PROBE-ACNC: ABNORMAL [IU]/ML
HBV DNA SERPL NAA+PROBE-LOG IU: >8.2 {LOG_IU}/ML

## 2018-11-12 ENCOUNTER — HEALTH MAINTENANCE LETTER (OUTPATIENT)
Age: 38
End: 2018-11-12

## 2019-01-21 ENCOUNTER — OFFICE VISIT (OUTPATIENT)
Dept: FAMILY MEDICINE | Facility: CLINIC | Age: 39
End: 2019-01-21
Payer: COMMERCIAL

## 2019-01-21 VITALS
WEIGHT: 131 LBS | SYSTOLIC BLOOD PRESSURE: 126 MMHG | BODY MASS INDEX: 27.5 KG/M2 | HEART RATE: 62 BPM | TEMPERATURE: 98.1 F | RESPIRATION RATE: 18 BRPM | OXYGEN SATURATION: 100 % | DIASTOLIC BLOOD PRESSURE: 82 MMHG | HEIGHT: 58 IN

## 2019-01-21 DIAGNOSIS — Z32.01 POSITIVE PREGNANCY TEST: Primary | ICD-10-CM

## 2019-01-21 DIAGNOSIS — E11.9 TYPE 2 DIABETES MELLITUS WITHOUT COMPLICATION, WITHOUT LONG-TERM CURRENT USE OF INSULIN (H): ICD-10-CM

## 2019-01-21 DIAGNOSIS — R11.2 NAUSEA AND VOMITING, INTRACTABILITY OF VOMITING NOT SPECIFIED, UNSPECIFIED VOMITING TYPE: ICD-10-CM

## 2019-01-21 LAB
GLUCOSE SERPL-MCNC: 124 MG/DL (ref 70–99)
HBA1C MFR BLD: 5.7 % (ref 0–5.6)
HCG UR QL: POSITIVE

## 2019-01-21 PROCEDURE — 82947 ASSAY GLUCOSE BLOOD QUANT: CPT | Performed by: NURSE PRACTITIONER

## 2019-01-21 PROCEDURE — 81025 URINE PREGNANCY TEST: CPT | Performed by: NURSE PRACTITIONER

## 2019-01-21 PROCEDURE — 99214 OFFICE O/P EST MOD 30 MIN: CPT | Performed by: NURSE PRACTITIONER

## 2019-01-21 PROCEDURE — 36415 COLL VENOUS BLD VENIPUNCTURE: CPT | Performed by: NURSE PRACTITIONER

## 2019-01-21 PROCEDURE — 83036 HEMOGLOBIN GLYCOSYLATED A1C: CPT | Performed by: NURSE PRACTITIONER

## 2019-01-21 RX ORDER — INFLUENZA A VIRUS A/NEBRASKA/14/2019 (H1N1) ANTIGEN (MDCK CELL DERIVED, PROPIOLACTONE INACTIVATED), INFLUENZA A VIRUS A/DELAWARE/39/2019 (H3N2) ANTIGEN (MDCK CELL DERIVED, PROPIOLACTONE INACTIVATED), INFLUENZA B VIRUS B/SINGAPORE/INFTT-16-0610/2016 ANTIGEN (MDCK CELL DERIVED, PROPIOLACTONE INACTIVATED), INFLUENZA B VIRUS B/DARWIN/7/2019 ANTIGEN (MDCK CELL DERIVED, PROPIOLACTONE INACTIVATED) 15; 15; 15; 15 UG/.5ML; UG/.5ML; UG/.5ML; UG/.5ML
INJECTION, SUSPENSION INTRAMUSCULAR
Refills: 0 | COMMUNITY
Start: 2018-10-06 | End: 2019-01-21

## 2019-01-21 RX ORDER — TENOFOVIR DISOPROXIL FUMARATE 300 MG/1
TABLET, FILM COATED ORAL
Refills: 2 | COMMUNITY
Start: 2018-03-07 | End: 2019-01-21

## 2019-01-21 RX ORDER — ONDANSETRON 4 MG/1
4-8 TABLET, ORALLY DISINTEGRATING ORAL EVERY 6 HOURS PRN
Qty: 30 TABLET | Refills: 0 | Status: SHIPPED | OUTPATIENT
Start: 2019-01-21 | End: 2019-02-13

## 2019-01-21 ASSESSMENT — MIFFLIN-ST. JEOR: SCORE: 1173.96

## 2019-01-21 ASSESSMENT — PAIN SCALES - GENERAL: PAINLEVEL: NO PAIN (0)

## 2019-01-21 NOTE — PATIENT INSTRUCTIONS
[facility]  [phone] [address], [place] [address]    Based on your last period you are 7 weeks 4 days and due 9/5/2019    -Take your prenatal vitamin every day (with 400-800 mcg of folic acid)  -Maintain a healthy weight  -Exercise regularly, eat a well-balanced diet, avoid alcohol and potentially dangerous chemicals, and drink caffeine only in moderation (<250 mg) daily  -Avoid tobacco products  -Avoid  ibuprofen, motrin, advil, aleve, or naproxen; can take tylenol (intermittently, not in excess) for pain  -Avoid scooping ramonita litter (if you do, wash hands thoroughly afterwards)  -Avoid raw meats and fish  -Heat lunch meat to steaming  -Eat fish just 2-3 times a week to avoid excess mercury  -Continue to exercise- this is good and healthy for you and your pregnancy.  Aim for 4-5 times a week of 30-45 minutes of moderate exercise  -Get enough sleep- aim for 8-9 hours a night and nap when needed  -Drink at least 10 glasses of water a day to maintain good hydration  -For nausea:      -Eat when you're hungry, empty stomach can aggravate nausea; eat small, frequent         meals      -High sugar foods can make nausea worse, aim for balance diet with sufficient protein     (20-30 grams of protein with every meal)      -Can take 10-25 mg of Vitamin B 6 every 6 hours as needed, do not exceed 200 mg         daily      -Can also do Doxylamine (unisom- available over the counter) plus Vitamin B6 25 mg     at night for sleep/nausea      -Accupunture can also be helpful    If you experience any vaginal bleeding and/or cramping, please call us right away.

## 2019-01-21 NOTE — PROGRESS NOTES
SUBJECTIVE:   Reji Lockwood is a 38 year old female who presents to clinic today for the following health issues:      GERD/Heartburn  Onset: 1/19/19    Description:     Burning in chest: YES    Intensity: moderate    Progression of Symptoms: same    Accompanying Signs & Symptoms:  Does it feel like food gets stuck: no  Nausea: YES  Vomiting (bloody?): YES- -- no blood  Abdominal Pain: YES  Black-Tarry stools: no:  Bloody stools: no    History:   Previous ulcers: no    Precipitating factors:   Caffeine use: no  Alcohol use: YES- social - very occasional  NSAID/Aspirin use: no  Tobacco use: no  Worse with - after eating.    Alleviating factors:  Zantac - no help noted (tried once)    Therapies Tried and outcome:none  Patient states these are symptoms she usually gets when pregnant  Positive pregnancy test at home- took 2 days ago  Patient's last menstrual period was 11/29/2018 (approximate).     EGA:7 weeks 4 days  JESSICA:  9/5/2019    Glucose at home was 190 after eating  Patient expresses concerns over keeping the pregnancy given her health and previous pregnancy complications.  Of note, she did not follow up on labs from 3/2018 that indicated she had type 2 diabetes after her pregnancy.    Vomiting every time she eats (3-4 times a day).  She is able to keep down liquids and is urinating normally.      Problem list and histories reviewed & adjusted, as indicated.  Additional history: as documented    Patient Active Problem List   Diagnosis     CARDIOVASCULAR SCREENING; LDL GOAL LESS THAN 160     Chronic viral hepatitis B without delta agent and without coma (H)     Type 2 diabetes mellitus without complication, without long-term current use of insulin (H)     Past Surgical History:   Procedure Laterality Date     CERCLAGE CERVICAL N/A 9/11/2017    Procedure: CERCLAGE CERVICAL;  Cervical Cerclage ;  Surgeon: Ez Erazo MD;  Location:  L+D       Social History     Tobacco Use     Smoking status: Never Smoker      "Smokeless tobacco: Never Used   Substance Use Topics     Alcohol use: No     Alcohol/week: 0.0 oz     Family History   Problem Relation Age of Onset     Kidney Disease Mother      Unknown/Adopted Father          No current outpatient medications on file.     Allergies   Allergen Reactions     Blood Transfusion Related (Informational Only) Other (See Comments)     Patient has a history of a clinically significant antibody against RBC antigens.  A delay in compatible RBCs may occur.     Fish Allergy Hives     Recent Labs   Lab Test 05/03/18  1501 04/05/18  1510 03/08/18  1000 11/17/17  1128 10/18/17  06/19/17  1533 09/02/15  1010   A1C  --   --   --   --  5.6  --  5.7 6.0   ALT 29 25 23 20  --    < >  --   --    CR  --   --  0.68 0.55  --    < >  --   --    GFRESTIMATED  --   --  >90 >90  --    < >  --   --    GFRESTBLACK  --   --  >90 >90  --    < >  --   --    POTASSIUM  --   --  4.0 3.6  --    < >  --   --    TSH  --   --   --   --   --   --   --  3.88    < > = values in this interval not displayed.      BP Readings from Last 3 Encounters:   01/21/19 126/82   03/08/18 122/76   02/22/18 125/81    Wt Readings from Last 3 Encounters:   01/21/19 59.4 kg (131 lb)   03/08/18 58 kg (127 lb 14.4 oz)   02/22/18 57.6 kg (127 lb)                    Reviewed and updated as needed this visit by clinical staff  Tobacco  Allergies  Meds  Med Hx  Surg Hx  Fam Hx  Soc Hx      Reviewed and updated as needed this visit by Provider  Tobacco  Allergies  Meds  Med Hx  Surg Hx  Fam Hx  Soc Hx        ROS:  Constitutional, HEENT, cardiovascular, pulmonary, GI, , musculoskeletal, neuro, skin, endocrine and psych systems are negative, except as otherwise noted.    OBJECTIVE:     /82 (BP Location: Left arm, Patient Position: Sitting, Cuff Size: Adult Regular)   Pulse 62   Temp 98.1  F (36.7  C) (Oral)   Resp 18   Ht 1.473 m (4' 10\")   Wt 59.4 kg (131 lb)   LMP 11/29/2018 (Approximate)   SpO2 100%   BMI 27.38 kg/m  "   Body mass index is 27.38 kg/m .  GENERAL: healthy, alert and no distress  NECK: no adenopathy, no asymmetry, masses, or scars and thyroid normal to palpation  RESP: lungs clear to auscultation - no rales, rhonchi or wheezes  CV: regular rate and rhythm, normal S1 S2, no S3 or S4, no murmur, click or rub, no peripheral edema and peripheral pulses strong  ABDOMEN: soft, nontender, no hepatosplenomegaly, no masses and bowel sounds normal  MS: no gross musculoskeletal defects noted, no edema    Diagnostic Test Results:  Results for orders placed or performed in visit on 01/21/19 (from the past 24 hour(s))   HEMOGLOBIN A1C   Result Value Ref Range    Hemoglobin A1C 5.7 (H) 0 - 5.6 %   HCG Qual, Urine - [facility],  Range, [place]  ([id number])   Result Value Ref Range    HCG Qual Urine Positive (A) NEG^Negative       ASSESSMENT/PLAN:     1. Positive pregnancy test  Patient undecided if she wants to keep pregnancy.  [facility]  In meantime, she will start PNV.  Advised to follow up with OB in 1-2 weeks if she plans to keep pregnancy.  - HCG Qual, Urine - CSC,  Range, [place]  ([id number])  - Prenatal Vit-Fe Fumarate-FA (PRENATAL MULTIVITAMIN  PLUS IRON) 27-1 MG TABS; Take 1 tablet by mouth daily  Dispense: 90 tablet; Refill: 3  - OB/GYN REFERRAL    2. Nausea and vomiting, intractability of vomiting not specified, unspecified vomiting type  Can use below.  Advised on other tips for n/v in pregnancy.  She will monitor for s/s of dehydration and go to emergency room if neede.d    - ondansetron (ZOFRAN-ODT) 4 MG ODT tab; Take 1-2 tablets (4-8 mg) by mouth every 6 hours as needed for nausea  Dispense: 30 tablet; Refill: 0  - OB/GYN REFERRAL    3. Type 2 diabetes mellitus without complication, without long-term current use of insulin (H)  a1c in prediabetic range.  Her GTT after last pregnancy did put her in the diabetic category.  Will defer to OB to manage during pregnancy. Advised checking fasting  glucoses until she sees them in 1-2 weeks (if decides to keep pregnancy)  - HEMOGLOBIN A1C  - Glucose  - OB/GYN REFERRAL    Patient Instructions   [facility]  [phone] [address], [place] [address]    Based on your last period you are 7 weeks 4 days and due 9/5/2019    -Take your prenatal vitamin every day (with 400-800 mcg of folic acid)  -Maintain a healthy weight  -Exercise regularly, eat a well-balanced diet, avoid alcohol and potentially dangerous chemicals, and drink caffeine only in moderation (<250 mg) daily  -Avoid tobacco products  -Avoid  ibuprofen, motrin, advil, aleve, or naproxen; can take tylenol (intermittently, not in excess) for pain  -Avoid scooping ramonita litter (if you do, wash hands thoroughly afterwards)  -Avoid raw meats and fish  -Heat lunch meat to steaming  -Eat fish just 2-3 times a week to avoid excess mercury  -Continue to exercise- this is good and healthy for you and your pregnancy.  Aim for 4-5 times a week of 30-45 minutes of moderate exercise  -Get enough sleep- aim for 8-9 hours a night and nap when needed  -Drink at least 10 glasses of water a day to maintain good hydration  -For nausea:      -Eat when you're hungry, empty stomach can aggravate nausea; eat small, frequent         meals      -High sugar foods can make nausea worse, aim for balance diet with sufficient protein     (20-30 grams of protein with every meal)      -Can take 10-25 mg of Vitamin B 6 every 6 hours as needed, do not exceed 200 mg         daily      -Can also do Doxylamine (unisom- available over the counter) plus Vitamin B6 25 mg     at night for sleep/nausea      -Accupunture can also be helpful    If you experience any vaginal bleeding and/or cramping, please call us right away.          ARVIND Mueller [facility]

## 2019-02-13 ENCOUNTER — OFFICE VISIT (OUTPATIENT)
Dept: OBGYN | Facility: CLINIC | Age: 39
End: 2019-02-13
Payer: COMMERCIAL

## 2019-02-13 VITALS
SYSTOLIC BLOOD PRESSURE: 118 MMHG | DIASTOLIC BLOOD PRESSURE: 72 MMHG | TEMPERATURE: 97.8 F | WEIGHT: 129 LBS | BODY MASS INDEX: 26.96 KG/M2 | HEART RATE: 67 BPM

## 2019-02-13 DIAGNOSIS — Z31.69 ENCOUNTER FOR PRECONCEPTION CONSULTATION: Primary | ICD-10-CM

## 2019-02-13 PROCEDURE — 99213 OFFICE O/P EST LOW 20 MIN: CPT | Performed by: OBSTETRICS & GYNECOLOGY

## 2019-02-13 NOTE — PATIENT INSTRUCTIONS
If you have any questions regarding your visit, Please contact your care team.     [employer]: [phone]    [facility] CLINIC HOURS TELEPHONE NUMBER       ANN Zaragoza-    Last Mast-Medical Assistant       Monday-[place]  8:00a.m-4:45 p.m  Tuesday-[place]  9:00a.m-11:45 a.m  Wednesday-[place] 8:00a.m-4:45 p.m.  Thursday-[place]  8:00a.m-4:45 p.m.  Friday-[place]  8:00a.m-4:45 p.m. [facility]  [address] [place] [address]  [phone] ask [facility]  [phone] Fax  Imaging [phone]    [facility] Labor and Delivery  [address] [place] [address] [place] [address]  [phone]    [facility]  [address]  [place] [address]  [phone] ask [facility]  [phone] Fax  Imaging [phone]     Urgent Care locations:    [facility] Monday-Friday  5 pm - 9 pm  Saturday and Sunday   9 am - 5 pm    Monday-Friday   11 am - 9 pm  Saturday and Sunday   9 am - 5 pm   [phone]       If you need a medication refill, please contact your pharmacy. Please allow 3 business days for your refill to be completed.  As always, Thank you for trusting us with your healthcare needs!

## 2019-02-14 NOTE — PROGRESS NOTES
OB-GYN Problem-Oriented Visit or Consultation      Reji Lockwood is a 38 year old year old P 2 who presents with a chief complaint of follow-up after SAB and preconception advice.  Referred by self.  Patient's last menstrual period was 01/28/2019.    HPI:     Had not used condoms much after PPE last yr and was open to pregnancy. Had SAB on 1/27 and ED evaluation with D&C done by Dr. Ordoñez. Had noted a cervical/upper vaginal laceration in the OR prior to the D&C and that was repaired. Patient indicates this was a desired pregnancy and denies any attempted EAB but she thought this may have arisen from the ED exam since it hurt. Patient does have a history of past cerclage also. Had transfusions postop with this D&C. Denies current symptoms. Felt sad but now better emotionally. Her  desires another child more than she does but they will discuss this more given her difficult pregnancies. NSA yet. Here with .     Past medical, obstetrical, surgical, family and social history reviewed and as noted or updated in chart.     Allergies, meds and supplements are as noted or updated in chart.      ROS:   Systems reviewed include:  constitutional, breast, cardiac, respiratory, gastrointestinal, genitourinary, musculoskeletal, integumentary, psychological, hematologic/lymphatic and endocrine.    These systems were negative for significant symptoms except for the following additional: none; see HPI.    EXAM:  VS as noted. /72 (BP Location: Left arm, Patient Position: Chair, Cuff Size: Adult Regular)   Pulse 67   Temp 97.8  F (36.6  C) (Oral)   Wt 58.5 kg (129 lb)   LMP 01/28/2019   Breastfeeding? No   BMI 26.96 kg/m    Constitutionally normal.     Exam not repeated at this time.    Assessment:   Encounter Diagnoses   Name Primary?     Encounter for preconception consultation Yes       I reviewed the condition, causes, differential diagnosis, prognosis, evaluation and management considerations and  options.  Questions answered and information given. See orders.         Plan and Recommendations: Recovering fine following SAB and D&C. Instructions given.  Preconception counseling reviewed as needed including cycle timing and optimization, medical status, meds, vaccines, exposures, nutrition, folic acid, family history, genetic screening (CF carrier scr, etc.), social issues and any applicable specific risk factors.   Total encounter time (physician together with patient) = 20min. Direct counseling, education and care coordination time (physician together with patient) = 10min.     A/P:  Reji was seen today for follow up.    Diagnoses and all orders for this visit:    Encounter for preconception consultation        Main Norwood MD

## 2020-07-07 NOTE — PATIENT INSTRUCTIONS
If you have any questions regarding your visit, Please contact your care team.     [employer]: [phone]    [facility] CLINIC HOURS TELEPHONE NUMBER   ANN Zaragoza-    Maddie Penaloza-NAYA Mast-Medical Assistant   Monday-[place]  8:00a.m-4:45 p.m  Wednesday-[place] 8:00a.m-4:45 p.m.  Thursday-[place]  8:00a.m-4:45 p.m.  Friday-[place]  8:00a.m-4:45 p.m. [facility]  [address]  [place], [place] [address] [phone] ask [facility]  [phone] Fax  Imaging [phone]    [facility]  [address] [place] [address] [address].  [place], [place] [address] [phone]    [facility]  [address] [place] [address]  [phone] ask [facility]  [phone] Fax  Imaging [phone]     Urgent Care locations:    [place]        [place] Monday-Friday  5 pm - 9 pm  Saturday and Sunday   9 am - 5 pm    Monday-Friday   11 am - 9 pm  Saturday and Sunday   9 am - 5 pm   [phone]       If you need a medication refill, please contact your pharmacy. Please allow 3 business days for your refill to be completed.  As always, Thank you for trusting us with your healthcare needs!     Internal Medicine Progress Note  Patient: Yusuf Pedro  Age/sex: 70 y o  male  Medical Record #: [id number]      ASSESSMENT/PLAN: (Interval History)  Yusuf Pedro is seen and examined and management for following issues:    Esophageal cancer; s/p minimally invasive esophagectomy, J tube placement 5/21/20; esophageal stent 5/30 for anastomotic leak  · Monitor incisions  · Continue Prilosec  · VBS 7/1 patient still has significant leak on right posterior aspect   · Cont strict NPO     Mediastinal abscess; s/p IR drain placement 6/15/20  · Flush daily with NSS 5 ml  · Observing off of antibiotic     Post-op SMV thrombus   · Cont Coumadin  · Dr Barry Fontana will be managing his Coumadin as OP    Post op ileus  · resolved     PAF  · Continue Amiodarone/Lopressor   · INR 2 1  · Continue Coumadin 1mg daily  · Repeat INR thursday     Nutrition/J-tube  · NPO  · Continue current TF/free water flushes  · Consulted dietician regarding trial of bolus feeds vs qhs feeds  · Strict NPO     Loose stools  · Continue Banana flakes TID  · Cont daily imodium started 7/4  · Negative cdiff 6/14/20  · improved     DM2   · Cont Accuchecks with SSI  · Blood sugars well controlled    · Cont Metformin 500mg BID      HTN/orthostasis  · Episode of orthostasis 7/3  · Lopressor to 25mg bid  · Had another episode yesterday will d/c lisinopril 7/7  · Cont TEDS     Stage 2 left buttock  · Cont local care     Acute Respiratory failure/aspiration PNA  · Completed antibiotic  · CXR 7/5 = bibasilar consolidation still present R>L due to atelectasis and/or aspiration, likely atelectasis in the setting of no fever or leukocytosis  · Continue Xopenex nebs TID prn and Mucinex  · Continue IS encourage hourly=needs to be reminded     Depression  · Regarding continued leak  · Cont lexapro; started 7/5  · Decreased seroquel to 25mg to avoid oversedation         The above assessment and plan was reviewed and updated as determined by my evaluation of the patient on 7/7/2020  Labs:   Results from last 7 days   Lab Units 07/06/20  0625 07/02/20  1934   WBC Thousand/uL 7 01 8 31   HEMOGLOBIN g/dL 9 0* 9 5*   HEMATOCRIT % 30 1* 30 7*   PLATELETS Thousands/uL 298 269     Results from last 7 days   Lab Units 07/02/20  1312   SODIUM mmol/L 136   POTASSIUM mmol/L 3 8   CHLORIDE mmol/L 102   CO2 mmol/L 27   BUN mg/dL 9   CREATININE mg/dL 0 85   CALCIUM mg/dL 9 3         Results from last 7 days   Lab Units 07/06/20  0625 07/02/20  1333   INR  2 12* 2 37*     Results from last 7 days   Lab Units 07/07/20  0626 07/07/20  0005 07/06/20  1813   POC GLUCOSE mg/dl 137 125 126       Review of Scheduled Meds:    Current Facility-Administered Medications:  acetaminophen 650 mg Per J Tube Q4H PRN Melany Reid MD   acetaminophen 975 mg Per PEG Tube Q8H [address] Carmen Dickerson PA-C   albuterol 2 5 mg Nebulization Q4H PRN Melany Reid MD   amiodarone 200 mg Oral Daily With Breakfast Melayn Reid MD   escitalopram 10 mg Oral Daily FAINA Roldan   guaiFENesin 200 mg Per J Tube Q8H FAINA Roldan   hydrALAZINE 10 mg Intravenous Q6H PRN Melany Reid MD   insulin lispro 2-12 Units Subcutaneous Q6H [address] Melany Reid MD   lidocaine 1 patch Topical Daily Melany Reid MD   lisinopril 5 mg Oral Daily FAINA Roldan   loperamide 2 mg Oral Daily FAINA Roldan   melatonin 6 mg Oral HS Melany Reid MD   metFORMIN 500 mg Oral BID With Meals FAINA Roldan   metoprolol tartrate 25 mg Oral Q12H [address] FAINA Portillo   omeprazole (PRILOSEC) suspension 2 mg/mL 20 mg Per PEG Tube Early Morning Melany Reid MD   ondansetron 4 mg Oral Q6H PRN Melany Reid MD   QUEtiapine 25 mg Oral HS Shanita Serene, CRNP   warfarin 1 mg Oral Daily (warfarin) Melany Reid MD       Subjective/ HPI: Patients overnight issues or events were reviewed with nursing or staff during rounds or morning huddle session  No new or overnight issues  Offers no complaints       Pt without complaints this a m, slept OK overnight      ROS:   A 10 point ROS was performed; negative except as noted above  Imaging:     XR chest pa & lateral   Final Result by Tonia Bal MD (07/05 1356)      Persistent bibasilar consolidation, greater on the right, likely atelectasis and aspiration  Right pigtail catheter with no pneumothorax  Workstation performed: [id number]         FL barium swallow   Final Result by Manda Mera MD (07/02 1726)      Status post esophagectomy and placement of esophageal stent  A substantial leak of contrast is identified near the distal end of the stent on the right posterior aspect  The study was marked in [facility] for immediate notification        Workstation performed: [id number]             *Labs /Radiology studies reviewed  *Medications reviewed and reconciled as needed  *Please refer to order section for additional ordered labs studies  *Case discussed with primary attending during morning huddle case rounds      Physical Examination:  Vitals:   Vitals:    07/07/20 0804 07/07/20 0859 07/07/20 0923 07/07/20 0924   BP: (!) 85/52 137/71     BP Location: Right arm Right arm     Pulse:   103 97   Resp:       Temp:       TempSrc:       SpO2:  96% 97%    Weight:       Height:           GEN: No apparent distress, interactive  NEURO: Alert and oriented x3  HEENT: Pupils are equal and reactive, EOMI, mucous membranes are moist, face symmetrical  CV: S1 S2 regular, no MRG, no peripheral edema noted  RESP: Lungs are decreased throughout, no further wheezing noted, no rales or rhonchi noted, R/A, respirations easy and non labored; +productive cough  GI: Flat, soft non tender, non distended; +BS x4; J tube intact  : Voiding without difficulty  MUSC: Moves all extremities  SKIN: pink, warm and dry, normal turgor, no rashes, lesions; pig tail drain to thoracic spine with purulent drainage          The above physical exam was reviewed and updated as determined by my evaluation of the patient on 7/7/2020  Invasive Devices     Peripherally Inserted Central Catheter Line            PICC Line 95/48/50 Left Basilic 24 days          Central Venous Catheter Line            Port A Cath 02/25/20 Right Chest 133 days          Drain            Gastrostomy/Enterostomy Jejunostomy 14 Fr  LUQ 46 days    Closed/Suction Drain Medial;Posterior Mediastinal Bulb 14 Fr  21 days                   VTE Pharmacologic Prophylaxis: Warfarin (Coumadin)  Code Status: Level 1 - Full Code  Current Length of Stay: 11 day(s)      Total time spent:  30 minutes  with more than 50% spent counseling/coordinating care  Counseling includes discussion with patient re: progress  and discussion with patient of his/her current medical state/information  Coordination of patient's care was performed in conjunction with primary service  Time invested included review of patient's labs, vitals, and management of their comorbidities with continued monitoring  In addition, this patient was discussed with medical team including physician and advanced extenders  The care of the patient was extensively discussed and appropriate treatment plan was formulated unique for this patient  ** Please Note:  voice to text software may have been used in the creation of this document   Although proof errors in transcription or interpretation are a potential of such software**

## 2021-09-15 ENCOUNTER — TRANSFERRED RECORDS (OUTPATIENT)
Dept: HEALTH INFORMATION MANAGEMENT | Facility: CLINIC | Age: 41
End: 2021-09-15

## 2022-03-18 NOTE — TELEPHONE ENCOUNTER
Spoke with pt for a bg report. Gdm - diet controlled - oscar 01/15/18. Pt checking bg fasting and 1 hr post-meal.  10/23: 96/143/135/  10/22: 94/135/112/132  10/21: 98/144/128/118  10/20: 96/138/131/125  Pt advised we will more than likely need to start insulin in the evening since fasting bg levels are a bit too high. Pt reminded of appt with me this Wed. Advised will teach insulin adminisration at that appt. Pt verbalized understanding.    Shavon Luo, RN, BSN, CDE   [facility]   Bexarotene Counseling:  I discussed with the patient the risks of bexarotene including but not limited to hair loss, dry lips/skin/eyes, liver abnormalities, hyperlipidemia, pancreatitis, depression/suicidal ideation, photosensitivity, drug rash/allergic reactions, hypothyroidism, anemia, leukopenia, infection, cataracts, and teratogenicity.  Patient understands that they will need regular blood tests to check lipid profile, liver function tests, white blood cell count, thyroid function tests and pregnancy test if applicable.

## 2022-11-15 ENCOUNTER — TELEPHONE (OUTPATIENT)
Dept: FAMILY MEDICINE | Facility: CLINIC | Age: 42
End: 2022-11-15

## 2022-11-15 ENCOUNTER — OFFICE VISIT (OUTPATIENT)
Dept: FAMILY MEDICINE | Facility: CLINIC | Age: 42
End: 2022-11-15
Payer: COMMERCIAL

## 2022-11-15 VITALS
DIASTOLIC BLOOD PRESSURE: 85 MMHG | HEART RATE: 70 BPM | SYSTOLIC BLOOD PRESSURE: 135 MMHG | TEMPERATURE: 98.2 F | OXYGEN SATURATION: 98 % | HEIGHT: 58 IN | BODY MASS INDEX: 29.47 KG/M2 | RESPIRATION RATE: 20 BRPM | WEIGHT: 140.4 LBS

## 2022-11-15 DIAGNOSIS — Z11.59 NEED FOR HEPATITIS C SCREENING TEST: ICD-10-CM

## 2022-11-15 DIAGNOSIS — Z12.31 ENCOUNTER FOR SCREENING MAMMOGRAM FOR BREAST CANCER: ICD-10-CM

## 2022-11-15 DIAGNOSIS — B18.1 CHRONIC VIRAL HEPATITIS B WITHOUT DELTA AGENT AND WITHOUT COMA (H): ICD-10-CM

## 2022-11-15 DIAGNOSIS — E11.9 TYPE 2 DIABETES MELLITUS WITHOUT COMPLICATION, WITHOUT LONG-TERM CURRENT USE OF INSULIN (H): ICD-10-CM

## 2022-11-15 DIAGNOSIS — Z00.00 ROUTINE GENERAL MEDICAL EXAMINATION AT A HEALTH CARE FACILITY: Primary | ICD-10-CM

## 2022-11-15 DIAGNOSIS — Z12.4 CERVICAL CANCER SCREENING: ICD-10-CM

## 2022-11-15 DIAGNOSIS — Z23 NEEDS FLU SHOT: ICD-10-CM

## 2022-11-15 DIAGNOSIS — N89.8 VAGINAL ITCHING: ICD-10-CM

## 2022-11-15 LAB
CLUE CELLS: ABNORMAL
CREAT UR-MCNC: 317 MG/DL
HBA1C MFR BLD: 9.7 % (ref 0–5.6)
MICROALBUMIN UR-MCNC: 10 MG/L
MICROALBUMIN/CREAT UR: 3.15 MG/G CR (ref 0–25)
TRICHOMONAS, WET PREP: ABNORMAL
WBC'S/HIGH POWER FIELD, WET PREP: ABNORMAL
YEAST, WET PREP: ABNORMAL

## 2022-11-15 PROCEDURE — 90471 IMMUNIZATION ADMIN: CPT | Performed by: NURSE PRACTITIONER

## 2022-11-15 PROCEDURE — 87624 HPV HI-RISK TYP POOLED RSLT: CPT | Performed by: NURSE PRACTITIONER

## 2022-11-15 PROCEDURE — 87210 SMEAR WET MOUNT SALINE/INK: CPT | Performed by: NURSE PRACTITIONER

## 2022-11-15 PROCEDURE — 90686 IIV4 VACC NO PRSV 0.5 ML IM: CPT | Performed by: NURSE PRACTITIONER

## 2022-11-15 PROCEDURE — 80061 LIPID PANEL: CPT | Performed by: NURSE PRACTITIONER

## 2022-11-15 PROCEDURE — 36415 COLL VENOUS BLD VENIPUNCTURE: CPT | Performed by: NURSE PRACTITIONER

## 2022-11-15 PROCEDURE — 80053 COMPREHEN METABOLIC PANEL: CPT | Performed by: NURSE PRACTITIONER

## 2022-11-15 PROCEDURE — 99214 OFFICE O/P EST MOD 30 MIN: CPT | Mod: 25 | Performed by: NURSE PRACTITIONER

## 2022-11-15 PROCEDURE — 82043 UR ALBUMIN QUANTITATIVE: CPT | Performed by: NURSE PRACTITIONER

## 2022-11-15 PROCEDURE — G0145 SCR C/V CYTO,THINLAYER,RESCR: HCPCS | Performed by: NURSE PRACTITIONER

## 2022-11-15 PROCEDURE — 87517 HEPATITIS B DNA QUANT: CPT | Performed by: NURSE PRACTITIONER

## 2022-11-15 PROCEDURE — 83036 HEMOGLOBIN GLYCOSYLATED A1C: CPT | Performed by: NURSE PRACTITIONER

## 2022-11-15 PROCEDURE — 82248 BILIRUBIN DIRECT: CPT | Performed by: NURSE PRACTITIONER

## 2022-11-15 PROCEDURE — 99386 PREV VISIT NEW AGE 40-64: CPT | Mod: 25 | Performed by: NURSE PRACTITIONER

## 2022-11-15 ASSESSMENT — ENCOUNTER SYMPTOMS
JOINT SWELLING: 0
NAUSEA: 0
CONSTIPATION: 0
EYE PAIN: 0
HEARTBURN: 0
WEAKNESS: 0
HEADACHES: 0
BREAST MASS: 0
DYSURIA: 0
ABDOMINAL PAIN: 0
SHORTNESS OF BREATH: 0
CHILLS: 0
HEMATOCHEZIA: 0
MYALGIAS: 1
COUGH: 0
HEMATURIA: 0
PALPITATIONS: 0
DIZZINESS: 0
NERVOUS/ANXIOUS: 0
SORE THROAT: 0
ARTHRALGIAS: 0
FREQUENCY: 0
FEVER: 0
PARESTHESIAS: 0
DIARRHEA: 0

## 2022-11-15 ASSESSMENT — PAIN SCALES - GENERAL: PAINLEVEL: SEVERE PAIN (6)

## 2022-11-15 NOTE — PROGRESS NOTES
SUBJECTIVE:   CC: Reji is an 42 year old who presents for preventive health visit.       Patient has been advised of split billing requirements and indicates understanding: Yes  Healthy Habits:     Getting at least 3 servings of Calcium per day:  Yes    Bi-annual eye exam:  NO    Dental care twice a year:  NO    Sleep apnea or symptoms of sleep apnea:  None    Diet:  Other    Frequency of exercise:  2-3 days/week    Duration of exercise:  15-30 minutes    Taking medications regularly:  Yes    Medication side effects:  None    PHQ-2 Total Score: 0    Additional concerns today:  No     ID: [id number]    Hx gestational diabetes    Vaginal itching before period with white discharge. No concern for STI.    Hx Hep B. Didn't follow up because provider left the clinic.last visit 2018          Today's PHQ-2 Score:   PHQ-2 ( 1999 [employer]) 11/15/2022   Q1: Little interest or pleasure in doing things 0   Q2: Feeling down, depressed or hopeless 0   PHQ-2 Score 0   Q1: Little interest or pleasure in doing things Not at all   Q2: Feeling down, depressed or hopeless Not at all   PHQ-2 Score 0       Abuse: Current or Past (Physical, Sexual or Emotional) - No  Do you feel safe in your environment? Yes    Have you ever done Advance Care Planning? (For example, a Health Directive, POLST, or a discussion with a medical provider or your loved ones about your wishes): No, advance care planning information given to patient to review.  Patient declined advance care planning discussion at this time.    Social History     Tobacco Use     Smoking status: Never     Smokeless tobacco: Never   Substance Use Topics     Alcohol use: No     Alcohol/week: 0.0 standard drinks     If you drink alcohol do you typically have >3 drinks per day or >7 drinks per week? No    Alcohol Use 11/15/2022   Prescreen: >3 drinks/day or >7 drinks/week? No   Prescreen: >3 drinks/day or >7 drinks/week? -       Reviewed orders with patient.  Reviewed health  maintenance and updated orders accordingly - Yes  Lab work is in process  Labs reviewed in EPIC  BP Readings from Last 3 Encounters:   11/15/22 135/85   19 118/72   19 126/82    Wt Readings from Last 3 Encounters:   11/15/22 63.7 kg (140 lb 6.4 oz)   19 58.5 kg (129 lb)   19 59.4 kg (131 lb)                  Patient Active Problem List   Diagnosis     CARDIOVASCULAR SCREENING; LDL GOAL LESS THAN 160     Chronic viral hepatitis B without delta agent and without coma (H)     Type 2 diabetes mellitus without complication, without long-term current use of insulin (H)     Past Surgical History:   Procedure Laterality Date     CERCLAGE CERVICAL N/A 2017    Procedure: CERCLAGE CERVICAL;  Cervical Cerclage ;  Surgeon: Ez Erazo MD;  Location:  L+D     HC TREATMENT MISSED  SURG, 1ST 2019       Social History     Tobacco Use     Smoking status: Never     Smokeless tobacco: Never   Substance Use Topics     Alcohol use: No     Alcohol/week: 0.0 standard drinks     Family History   Problem Relation Age of Onset     Kidney Disease Mother      Unknown/Adopted Father          No current outpatient medications on file.     Allergies   Allergen Reactions     Blood Transfusion Related (Informational Only) Other (See Comments)     Patient has a history of a clinically significant antibody against RBC antigens.  A delay in compatible RBCs may occur.     Fish Allergy Hives     Recent Labs   Lab Test 19  1619 18  1501 18  1510 18  1000 17  1128 10/18/17  0000 17  1633 17  1533 09/02/15  1010   A1C 5.7*  --   --   --   --  5.6  --  5.7 6.0   ALT  --  29 25 23 20  --    < >  --   --    CR  --   --   --  0.68 0.55  --    < >  --   --    GFRESTIMATED  --   --   --  >90 >90  --    < >  --   --    GFRESTBLACK  --   --   --  >90 >90  --    < >  --   --    POTASSIUM  --   --   --  4.0 3.6  --    < >  --   --    TSH  --   --   --   --   --   --   --    --  3.88    < > = values in this interval not displayed.        Breast Cancer Screening:    FHS-7: No flowsheet data found.    Patient under 40 years of age: Routine Mammogram Screening not recommended.   Pertinent mammograms are reviewed under the imaging tab.    History of abnormal Pap smear: NO - age 30-65 PAP every 5 years with negative HPV co-testing recommended  PAP / HPV 2015   PAP (Historical) NIL     Reviewed and updated as needed this visit by clinical staff   Tobacco  Allergies  Meds   Med Hx  Surg Hx  Fam Hx  Soc Hx        Reviewed and updated as needed this visit by Provider                 Past Medical History:   Diagnosis Date     Chronic viral hepatitis B without delta agent and without coma (H)      History of transfusion of packed red blood cells      Type 2 diabetes mellitus (H) 3/10/2018      Past Surgical History:   Procedure Laterality Date     CERCLAGE CERVICAL N/A 2017    Procedure: CERCLAGE CERVICAL;  Cervical Cerclage ;  Surgeon: Ez Erazo MD;  Location:  L+D      TREATMENT MISSED  SURG, 1ST TRIMESTER         Review of Systems   Constitutional: Negative for chills and fever.   HENT: Negative for congestion, ear pain, hearing loss and sore throat.    Eyes: Negative for pain and visual disturbance.   Respiratory: Negative for cough and shortness of breath.    Cardiovascular: Negative for chest pain, palpitations and peripheral edema.   Gastrointestinal: Negative for abdominal pain, constipation, diarrhea, heartburn, hematochezia and nausea.   Breasts:  Negative for tenderness, breast mass and discharge.   Genitourinary: Negative for dysuria, frequency, genital sores, hematuria, pelvic pain, urgency and vaginal bleeding.   Musculoskeletal: Positive for myalgias. Negative for arthralgias and joint swelling.   Skin: Negative for rash.   Neurological: Negative for dizziness, weakness, headaches and paresthesias.   Psychiatric/Behavioral: Negative  "for mood changes. The patient is not nervous/anxious.           OBJECTIVE:   /85 (BP Location: Left arm, Patient Position: Sitting, Cuff Size: Adult Regular)   Pulse 70   Temp 98.2  F (36.8  C) (Temporal)   Resp 20   Ht 1.473 m (4' 10\")   Wt 63.7 kg (140 lb 6.4 oz)   LMP  (LMP Unknown)   SpO2 98%   BMI 29.34 kg/m    Physical Exam  GENERAL: healthy, alert and no distress  EYES: Eyes grossly normal to inspection, PERRL and conjunctivae and sclerae normal  HENT: ear canals and TM's normal, nose and mouth without ulcers or lesions  NECK: no adenopathy, no asymmetry, masses, or scars and thyroid normal to palpation  RESP: lungs clear to auscultation - no rales, rhonchi or wheezes  CV: regular rate and rhythm, normal S1 S2, no S3 or S4, no murmur, click or rub, no peripheral edema and peripheral pulses strong  ABDOMEN: soft, nontender, no hepatosplenomegaly, no masses and bowel sounds normal   (female): normal female external genitalia, normal urethral meatus, vaginal mucosa pink, moist, well rugated, and normal cervix/adnexa/uterus without masses or discharge  MS: no gross musculoskeletal defects noted, no edema  SKIN: no suspicious lesions or rashes  NEURO: Normal strength and tone, mentation intact and speech normal  PSYCH: mentation appears normal, affect normal/bright  Diabetic foot exam: normal DP and PT pulses, no trophic changes or ulcerative lesions and normal sensory exam      Diagnostic Test Results:  Labs reviewed in Epic    ASSESSMENT/PLAN:   (Z00.00) Routine general medical examination at a health care facility  (primary encounter diagnosis)  Plan: REVIEW OF HEALTH MAINTENANCE PROTOCOL ORDERS            (E11.9) Type 2 diabetes mellitus without complication, without long-term current use of insulin (H)  Comment: checking labs. Currently diet controlled.  Plan: Lipid panel reflex to direct LDL Non-fasting,         Albumin Random Urine Quantitative with Creat         Ratio, BASIC METABOLIC " "PANEL, HEMOGLOBIN A1C            (B18.1) Chronic viral hepatitis B without delta agent and without coma (H)  Comment: last visit 2018. Overdue for follow up.  Plan: Hep B Virus DNA Quant Real Time PCR, Hepatic         panel (Albumin, ALT, AST, Bili, Alk Phos, TP),         Adult GI  Referral - Consult Only            (Z12.4) Cervical cancer screening  Plan: Pap Screen with HPV - recommended age 30 - 65         years            (Z11.59) Need for hepatitis C screening test  Comment: patient states she will plan to do next time    (Z23) Needs flu shot  Plan: INFLUENZA VACCINE IM > 6 MONTHS VALENT IIV4         (AFLURIA/FLUZONE)            (Z12.31) Encounter for screening mammogram for breast cancer  Plan: *MA Screening Digital Bilateral            (N89.8) Vaginal itching  Plan: Wet prep - Clinic Collect                    COUNSELING:  Reviewed preventive health counseling, as reflected in patient instructions       Regular exercise       Healthy diet/nutrition    Estimated body mass index is 29.34 kg/m  as calculated from the following:    Height as of this encounter: 1.473 m (4' 10\").    Weight as of this encounter: 63.7 kg (140 lb 6.4 oz).    Weight management plan: Discussed healthy diet and exercise guidelines    She reports that she has never smoked. She has never used smokeless tobacco.      Counseling Resources:  ATP IV Guidelines  Pooled Cohorts Equation Calculator  Breast Cancer Risk Calculator  BRCA-Related Cancer Risk Assessment: FHS-7 Tool  FRAX Risk Assessment  ICSI Preventive Guidelines  Dietary Guidelines for Americans, 2010  USDA's MyPlate  ASA Prophylaxis  Lung CA Screening    NATHAN MARTÍNEZ, ARVIND CNP  [facility]  "

## 2022-11-15 NOTE — TELEPHONE ENCOUNTER
This writer attempted to contact patient via [employer]  on 11/15/22      Reason for call relay results and left message.      If patient calls back:   Registered Nurse called. Route to [place] RN line. Relay provider message below.    ----- Message from ARVIND Jeffries CNP sent at 11/15/2022  2:51 PM CST -----  Please call patient with  - wet prep is NORMAL. Medication is not needed at this time    ________________________________    VICENTE Bernardo, RN  [facility]

## 2022-11-15 NOTE — PATIENT INSTRUCTIONS
To schedule your imaging exam at any of the [facility] imaging locations, please call [phone] for mammogram      Patient Education    At [facility], we strive to deliver an exceptional experience to you, every time we see you. If you receive a survey, please complete it as we do value your feedback.  If you have MyChart, you can expect to receive results automatically within 24 hours of their completion.  Your provider will send a note interpreting your results as well.   If you do not have MyChart, you should receive your results in about a week by mail.    Your care team:                            Family Medicine Internal Medicine   MD Don Olivia MD Shantel Branch-Fleming, MD Srinivasa Vaka, MD Katya Belousova, ARVIND Vick CNP, MD ([facility]   SURESH Judge MD Paula Brito, MD Amelia Massimini APRN ARVIND Ruelas CNP, MD Charanya Pasupathi, MD          Clinic hours: Monday - Thursday 7 am-6 pm; Fridays 7 am-5 pm.   Urgent care: Monday - Friday 10 am- 8 pm; Saturday and Sunday 9 am-5 pm.    Clinic: [phone]       [facility]: Monday - Thursday 8 am - 7 pm; Friday 8 am - 6 pm  [facility]: [phone]     Preventive Health Recommendations  Female Ages 40 to 49    Yearly exam:   See your health care provider every year in order to  Review health changes.   Discuss preventive care.    Review your medicines if your doctor prescribed any.    Get a Pap test every three years (unless you have an abnormal result and your provider advises testing more often).    If you get Pap tests with HPV test, you only need to test every 5 years, unless you have an abnormal result. You do not need a Pap test if your uterus was removed (hysterectomy) and you have not had cancer.    You should be tested each year for STDs  (sexually transmitted diseases), if you're at risk.   Ask your doctor if you should have a mammogram.    Have a colonoscopy (test for colon cancer) if someone in your family has had colon cancer or polyps before age 50.     Have a cholesterol test every 5 years.     Have a diabetes test (fasting glucose) after age 45. If you are at risk for diabetes, you should have this test every 3 years.    Shots: Get a flu shot each year. Get a tetanus shot every 10 years.     Nutrition:   Eat at least 5 servings of fruits and vegetables each day.  Eat whole-grain bread, whole-wheat pasta and brown rice instead of white grains and rice.  Get adequate Calcium and Vitamin D.      Lifestyle  Exercise at least 150 minutes a week (an average of 30 minutes a day, 5 days a week). This will help you control your weight and prevent disease.  Limit alcohol to one drink per day.  No smoking.   Wear sunscreen to prevent skin cancer.  See your dentist every six months for an exam and cleaning.

## 2022-11-16 LAB
ALBUMIN SERPL-MCNC: 3.4 G/DL (ref 3.4–5)
ALP SERPL-CCNC: 93 U/L (ref 40–150)
ALT SERPL W P-5'-P-CCNC: 130 U/L (ref 0–50)
ANION GAP SERPL CALCULATED.3IONS-SCNC: 5 MMOL/L (ref 3–14)
AST SERPL W P-5'-P-CCNC: 63 U/L (ref 0–45)
BILIRUB DIRECT SERPL-MCNC: 0.1 MG/DL (ref 0–0.2)
BILIRUB SERPL-MCNC: 0.6 MG/DL (ref 0.2–1.3)
BUN SERPL-MCNC: 11 MG/DL (ref 7–30)
CALCIUM SERPL-MCNC: 9 MG/DL (ref 8.5–10.1)
CHLORIDE BLD-SCNC: 103 MMOL/L (ref 94–109)
CHOLEST SERPL-MCNC: 178 MG/DL
CO2 SERPL-SCNC: 29 MMOL/L (ref 20–32)
CREAT SERPL-MCNC: 0.56 MG/DL (ref 0.52–1.04)
FASTING STATUS PATIENT QL REPORTED: NO
GFR SERPL CREATININE-BSD FRML MDRD: >90 ML/MIN/1.73M2
GLUCOSE BLD-MCNC: 190 MG/DL (ref 70–99)
HDLC SERPL-MCNC: 63 MG/DL
LDLC SERPL CALC-MCNC: 74 MG/DL
NONHDLC SERPL-MCNC: 115 MG/DL
POTASSIUM BLD-SCNC: 3.5 MMOL/L (ref 3.4–5.3)
PROT SERPL-MCNC: 6.9 G/DL (ref 6.8–8.8)
SODIUM SERPL-SCNC: 137 MMOL/L (ref 133–144)
TRIGL SERPL-MCNC: 207 MG/DL

## 2022-11-16 NOTE — TELEPHONE ENCOUNTER
Called patient via [employer]  to relay provider result message below, patient verbalized understanding. Patient stating that she will reach out to OB/GYN for any further issues with vaginal discharge or other concerns.      EDITA BernardoN, RN  [facility]

## 2022-11-17 ENCOUNTER — OFFICE VISIT (OUTPATIENT)
Dept: FAMILY MEDICINE | Facility: CLINIC | Age: 42
End: 2022-11-17
Payer: COMMERCIAL

## 2022-11-17 ENCOUNTER — TELEPHONE (OUTPATIENT)
Dept: FAMILY MEDICINE | Facility: CLINIC | Age: 42
End: 2022-11-17

## 2022-11-17 VITALS
HEART RATE: 72 BPM | HEIGHT: 59 IN | DIASTOLIC BLOOD PRESSURE: 78 MMHG | OXYGEN SATURATION: 99 % | WEIGHT: 141 LBS | SYSTOLIC BLOOD PRESSURE: 124 MMHG | TEMPERATURE: 97.3 F | BODY MASS INDEX: 28.43 KG/M2

## 2022-11-17 DIAGNOSIS — E11.9 TYPE 2 DIABETES MELLITUS WITHOUT COMPLICATION, WITHOUT LONG-TERM CURRENT USE OF INSULIN (H): Primary | ICD-10-CM

## 2022-11-17 LAB
BKR LAB AP GYN ADEQUACY: NORMAL
BKR LAB AP GYN INTERPRETATION: NORMAL
BKR LAB AP HPV REFLEX: NORMAL
BKR LAB AP LMP: NORMAL
BKR LAB AP PREVIOUS ABNORMAL: NORMAL
HBV DNA SERPL NAA+PROBE-ACNC: ABNORMAL IU/ML
HBV DNA SERPL NAA+PROBE-LOG IU: >8.2 {LOG_IU}/ML
PATH REPORT.COMMENTS IMP SPEC: NORMAL
PATH REPORT.COMMENTS IMP SPEC: NORMAL
PATH REPORT.RELEVANT HX SPEC: NORMAL

## 2022-11-17 PROCEDURE — 99213 OFFICE O/P EST LOW 20 MIN: CPT | Performed by: NURSE PRACTITIONER

## 2022-11-17 RX ORDER — GLUCOSAMINE HCL/CHONDROITIN SU 500-400 MG
CAPSULE ORAL
Qty: 100 EACH | Refills: 3 | Status: SHIPPED | OUTPATIENT
Start: 2022-11-17 | End: 2023-12-14

## 2022-11-17 RX ORDER — LANCETS
EACH MISCELLANEOUS
Qty: 100 EACH | Refills: 6 | Status: SHIPPED | OUTPATIENT
Start: 2022-11-17 | End: 2024-05-04

## 2022-11-17 ASSESSMENT — PAIN SCALES - GENERAL: PAINLEVEL: NO PAIN (0)

## 2022-11-17 NOTE — TELEPHONE ENCOUNTER
PA Initiation    Medication: Ozempic 2MG/1.5ML  Insurance Company: [employer] - Phone [phone] Fax [phone]  Pharmacy Filling the Rx:    Filling Pharmacy Phone:    Filling Pharmacy Fax:    Start Date: 11/17/2022    [id number]

## 2022-11-17 NOTE — PATIENT INSTRUCTIONS
Check blood sugar every morning before breakfast and record  Ozempic every 7 days. Start with 0.25 mg for 4 doses and then increase to 0.5 mg going forward  Schedule diabetic eye exam  Follow up with Eloisa in 1 month

## 2022-11-17 NOTE — PROGRESS NOTES
Assessment & Plan     Type 2 diabetes mellitus without complication, without long-term current use of insulin (H)  Discussed the below in detail with patient. Discussed glp1 vs sglt2. Avoiding metformin d/t active liver disease. Risks/benefits of both discussed. Patient ultimately chose ozempic. Denies hx MEN2 and MTC. Follow up 1 month. Declines diabetes education for now. She may consider at a later time.   - Adult Eye  Referral; Future  - blood glucose monitoring (NO BRAND SPECIFIED) meter device kit; Use to test blood sugar 1 time daily or as directed. Preferred blood glucose meter OR supplies to accompany: Blood Glucose Monitor Brands: per insurance.  - blood glucose (NO BRAND SPECIFIED) test strip; Use to test blood sugar 1 time daily or as directed. To accompany: Blood Glucose Monitor Brands: per insurance.  - blood glucose calibration (NO BRAND SPECIFIED) solution; To accompany: Blood Glucose Monitor Brands: per insurance.  - thin (NO BRAND SPECIFIED) lancets; Use with lanceting device. To accompany: Blood Glucose Monitor Brands: per insurance.  - alcohol swab prep pads; Use to swab area of injection/freddy as directed.  - semaglutide (OZEMPIC) 2 MG/1.5ML SOPN pen; Inject 0.25 mg Subcutaneous every 7 days Increase to 0.5 mg per dose after 1 month.  - insulin pen needle (31G X 6 MM) 31G X 6 MM miscellaneous; Use 1 pen needles daily or as directed.             See Patient Instructions    Return in about 4 weeks (around 12/15/2022).     The benefits, risks and potential side effects were discussed in detail. Black box warnings discussed as relevant. All patient questions were answered. The patient was instructed to follow up immediately if any adverse reactions develop.    Return precautions discussed, including when to seek urgent/emergent care.    Patient verbalizes understanding and agrees with plan of care. Patient stable for discharge.      ARVIND LING [facility]  "KIMI Mendoza is a 42 year old accompanied by her phone , presenting for the following health issues:  Diabetes      History of Present Illness       Diabetes:   She presents for follow up of diabetes.  She is not checking blood glucose. She is concerned about other. She is having weight gain. The patient has not had a diabetic eye exam in the last 12 months.         She eats 2-3 servings of fruits and vegetables daily.She consumes 1 sweetened beverage(s) daily.She exercises with enough effort to increase her heart rate 20 to 29 minutes per day.  She exercises with enough effort to increase her heart rate 4 days per week.   She is taking medications regularly.         Newly dx with diabetes earlier this week at physical  She reports gestational diabetes with last pregnancy 5-6 years ago  She feels comfortable checking BG as she did when pregnant        Review of Systems   Constitutional, HEENT, cardiovascular, pulmonary, gi and gu systems are negative, except as otherwise noted.      Objective    /78 (BP Location: Left arm, Patient Position: Sitting, Cuff Size: Adult Regular)   Pulse 72   Temp 97.3  F (36.3  C) (Tympanic)   Ht 1.495 m (4' 10.86\")   Wt 64 kg (141 lb)   LMP 10/31/2022   SpO2 99%   BMI 28.62 kg/m    Body mass index is 28.62 kg/m .  Physical Exam   GENERAL: healthy, alert and no distress  CV: regular rate and rhythm, normal S1 S2, no S3 or S4, no murmur, click or rub, no peripheral edema and peripheral pulses strong  ABDOMEN: soft, nontender, no hepatosplenomegaly, no masses and bowel sounds normal  MS: no gross musculoskeletal defects noted, no edema  Diabetic foot exam: normal DP and PT pulses, no trophic changes or ulcerative lesions and normal sensory exam                    "

## 2022-11-18 NOTE — TELEPHONE ENCOUNTER
PRIOR AUTHORIZATION DENIED    Medication: Ozempic 2MG/1.5ML    Denial Date: 11/17/2022    Denial Rational:     Appeal Information:

## 2022-11-21 LAB
HUMAN PAPILLOMA VIRUS 16 DNA: NEGATIVE
HUMAN PAPILLOMA VIRUS 18 DNA: NEGATIVE
HUMAN PAPILLOMA VIRUS FINAL DIAGNOSIS: NORMAL
HUMAN PAPILLOMA VIRUS OTHER HR: NEGATIVE

## 2022-11-22 RX ORDER — LIRAGLUTIDE 6 MG/ML
INJECTION SUBCUTANEOUS
Qty: 6 ML | Refills: 1 | Status: SHIPPED | OUTPATIENT
Start: 2022-11-22 | End: 2023-01-27

## 2022-11-22 NOTE — TELEPHONE ENCOUNTER
Patient updated on the below, no questions at this time, verbalized understanding.    Bekah Nayak RN

## 2022-11-25 DIAGNOSIS — E11.9 TYPE 2 DIABETES MELLITUS WITHOUT COMPLICATION, WITHOUT LONG-TERM CURRENT USE OF INSULIN (H): ICD-10-CM

## 2022-11-25 RX ORDER — LIRAGLUTIDE 6 MG/ML
INJECTION SUBCUTANEOUS
Qty: 6 ML | Refills: 1 | Status: CANCELLED | OUTPATIENT
Start: 2022-11-25 | End: 2022-12-25

## 2022-11-25 NOTE — TELEPHONE ENCOUNTER
Pen needle prescription was sent to pharmacy on 11/17.   insulin pen needle (31G X 6 MM) 31G X 6 MM miscellaneous 100 each 4 11/17/2022

## 2022-12-12 ENCOUNTER — OFFICE VISIT (OUTPATIENT)
Dept: FAMILY MEDICINE | Facility: CLINIC | Age: 42
End: 2022-12-12
Payer: COMMERCIAL

## 2022-12-12 VITALS
HEIGHT: 58 IN | HEART RATE: 63 BPM | BODY MASS INDEX: 29.52 KG/M2 | TEMPERATURE: 97.2 F | OXYGEN SATURATION: 99 % | WEIGHT: 140.6 LBS | DIASTOLIC BLOOD PRESSURE: 86 MMHG | SYSTOLIC BLOOD PRESSURE: 144 MMHG | RESPIRATION RATE: 16 BRPM

## 2022-12-12 DIAGNOSIS — E11.9 TYPE 2 DIABETES MELLITUS WITHOUT COMPLICATION, WITHOUT LONG-TERM CURRENT USE OF INSULIN (H): Primary | ICD-10-CM

## 2022-12-12 DIAGNOSIS — R03.0 ELEVATED BLOOD PRESSURE READING WITHOUT DIAGNOSIS OF HYPERTENSION: ICD-10-CM

## 2022-12-12 PROCEDURE — 99213 OFFICE O/P EST LOW 20 MIN: CPT | Performed by: NURSE PRACTITIONER

## 2022-12-12 ASSESSMENT — PAIN SCALES - GENERAL: PAINLEVEL: NO PAIN (0)

## 2022-12-12 NOTE — PROGRESS NOTES
Assessment & Plan     Type 2 diabetes mellitus without complication, without long-term current use of insulin (H)  Recently started Victoza. Doing well. She will consider diabetes educator in the coming months but declines for now.    Elevated blood pressure reading without diagnosis of hypertension  Elevated today. Asymptomatic. Patient reports she has been worried and thinks that's why BP is elevated today. Monitor.                See Patient Instructions    Return in about 3 months (around 3/12/2023).       The benefits, risks and potential side effects were discussed in detail. Black box warnings discussed as relevant. All patient questions were answered. The patient was instructed to follow up immediately if any adverse reactions develop.    Return precautions discussed, including when to seek urgent/emergent care.    Patient verbalizes understanding and agrees with plan of care. Patient stable for discharge.      ARVIND LING [facility]endo[facility] is a 42 year old accompanied by her phone  and young daughter, presenting for the following health issues:  Diabetes      History of Present Illness       Diabetes:   She presents for follow up of diabetes.  She is checking home blood glucose one time daily. She checks blood glucose before meals.  Blood glucose is never over 200 and never under 70. When her blood glucose is low, the patient is asymptomatic for confusion, blurred vision, lethargy and reports not feeling dizzy, shaky, or weak.  She has no concerns regarding her diabetes at this time.  She is not experiencing numbness or burning in feet, excessive thirst, blurry vision, weight changes or redness, sores or blisters on feet. The patient has not had a diabetic eye exam in the last 12 months.         She eats 0-1 servings of fruits and vegetables daily.She consumes 0 sweetened beverage(s) daily.She exercises with enough effort to increase  "her heart rate 30 to 60 minutes per day.  She exercises with enough effort to increase her heart rate 5 days per week.   She is taking medications regularly.     Started Victoza. Has only taken 1 dose. No trouble with injection.  -140  No s/e  Has eye exam on Thurs    Wants to hold off on diabetes educator for now    Seeing GI in Feb for hep B follow up           Review of Systems   Constitutional, HEENT, cardiovascular, pulmonary, gi and gu systems are negative, except as otherwise noted.      Objective    BP (!) 144/86 (BP Location: Left arm, Patient Position: Sitting, Cuff Size: Adult Regular)   Pulse 63   Temp 97.2  F (36.2  C) (Tympanic)   Resp 16   Ht 1.48 m (4' 10.27\")   Wt 63.8 kg (140 lb 9.6 oz)   LMP  (LMP Unknown)   SpO2 99%   BMI 29.12 kg/m    Body mass index is 29.12 kg/m .  Physical Exam   GENERAL: healthy, alert and no distress  RESP: lungs clear to auscultation - no rales, rhonchi or wheezes  CV: regular rate and rhythm, normal S1 S2, no S3 or S4, no murmur, click or rub, no peripheral edema and peripheral pulses strong  MS: no gross musculoskeletal defects noted, no edema  PSYCH: mentation appears normal, affect normal/bright                    "

## 2022-12-12 NOTE — PATIENT INSTRUCTIONS
At [facility], we strive to deliver an exceptional experience to you, every time we see you. If you receive a survey, please complete it as we do value your feedback.  If you have MyChart, you can expect to receive results automatically within 24 hours of their completion.  Your provider will send a note interpreting your results as well.   If you do not have MyChart, you should receive your results in about a week by mail.    Your care team:                            Family Medicine Internal Medicine   MD Don Olivia MD Shantel Branch-Fleming, MD Srinivasa Vaka, MD Katya Belousova, PAARVIND Roblero CNP, MD ([facility]   Deon Arambula, MD Huong Flores MD Amelia Massimini APRN LATONYA Dixon APRN MD Lula Batres MD          Clinic hours: Monday - Thursday 7 am-6 pm; Fridays 7 am-5 pm.   Urgent care: Monday - Friday 10 am- 8 pm; Saturday and Sunday 9 am-5 pm.    Clinic: [phone]       [facility]: Monday - Thursday 8 am - 7 pm; Friday 8 am - 6 pm  [facility]: [phone]

## 2022-12-15 ENCOUNTER — OFFICE VISIT (OUTPATIENT)
Dept: OPTOMETRY | Facility: CLINIC | Age: 42
End: 2022-12-15
Attending: NURSE PRACTITIONER
Payer: COMMERCIAL

## 2022-12-15 DIAGNOSIS — E11.9 TYPE 2 DIABETES MELLITUS WITHOUT COMPLICATION, WITHOUT LONG-TERM CURRENT USE OF INSULIN (H): Primary | ICD-10-CM

## 2022-12-15 DIAGNOSIS — H52.13 MYOPIA, BILATERAL: ICD-10-CM

## 2022-12-15 DIAGNOSIS — H52.223 REGULAR ASTIGMATISM OF BOTH EYES: ICD-10-CM

## 2022-12-15 PROCEDURE — 92015 DETERMINE REFRACTIVE STATE: CPT | Performed by: OPTOMETRIST

## 2022-12-15 PROCEDURE — 92004 COMPRE OPH EXAM NEW PT 1/>: CPT | Performed by: OPTOMETRIST

## 2022-12-15 ASSESSMENT — REFRACTION_WEARINGRX
OD_SPHERE: -1.50
OD_AXIS: 003
OS_AXIS: 015
OD_CYLINDER: +0.75
SPECS_TYPE: SVL
OS_CYLINDER: +0.75
OS_SPHERE: -1.75

## 2022-12-15 ASSESSMENT — REFRACTION_MANIFEST
METHOD_AUTOREFRACTION: 1
OD_CYLINDER: +1.00
OS_CYLINDER: +0.75
OS_AXIS: 015
OD_AXIS: 178
OD_CYLINDER: +0.75
OS_CYLINDER: +0.75
OS_SPHERE: -1.75
OS_AXIS: 013
OS_SPHERE: -1.75
OD_SPHERE: -1.75
OD_SPHERE: -1.50
OD_AXIS: 003

## 2022-12-15 ASSESSMENT — VISUAL ACUITY
OD_CC: 20/20
CORRECTION_TYPE: GLASSES
OD_CC+: -1
OS_CC+: -1
OS_SC: 20/25
OS_CC: 20/25
OD_SC: 20/25
METHOD: SNELLEN - LINEAR

## 2022-12-15 ASSESSMENT — CONF VISUAL FIELD
OD_SUPERIOR_TEMPORAL_RESTRICTION: 0
OD_NORMAL: 1
OS_INFERIOR_NASAL_RESTRICTION: 0
OS_NORMAL: 1
OS_INFERIOR_TEMPORAL_RESTRICTION: 0
OD_INFERIOR_NASAL_RESTRICTION: 0
OS_SUPERIOR_NASAL_RESTRICTION: 0
OD_INFERIOR_TEMPORAL_RESTRICTION: 0
OS_SUPERIOR_TEMPORAL_RESTRICTION: 0
OD_SUPERIOR_NASAL_RESTRICTION: 0

## 2022-12-15 ASSESSMENT — KERATOMETRY
OS_AXISANGLE2_DEGREES: 17
OD_K2POWER_DIOPTERS: 46.25
OS_K2POWER_DIOPTERS: 46.00
OD_AXISANGLE2_DEGREES: 124
OD_AXISANGLE_DEGREES: 34
OS_AXISANGLE_DEGREES: 107
OD_K1POWER_DIOPTERS: 46.00
OS_K1POWER_DIOPTERS: 45.75

## 2022-12-15 ASSESSMENT — EXTERNAL EXAM - LEFT EYE: OS_EXAM: NORMAL

## 2022-12-15 ASSESSMENT — CUP TO DISC RATIO
OS_RATIO: 0.2
OD_RATIO: 0.2

## 2022-12-15 ASSESSMENT — TONOMETRY
OS_IOP_MMHG: 18
IOP_METHOD: APPLANATION
OD_IOP_MMHG: 18

## 2022-12-15 ASSESSMENT — SLIT LAMP EXAM - LIDS
COMMENTS: NORMAL
COMMENTS: NORMAL

## 2022-12-15 ASSESSMENT — EXTERNAL EXAM - RIGHT EYE: OD_EXAM: NORMAL

## 2022-12-15 NOTE — PROGRESS NOTES
Chief Complaint   Patient presents with     Diabetic Eye Exam     Accompanied by   Chief Complaint(s) and History of Present Illness(es)     Diabetic Eye Exam            Diabetes Type: Type 2               Lab Results   Component Value Date    A1C 9.7 11/15/2022    A1C 5.7 01/21/2019    A1C 5.6 10/18/2017    A1C 5.7 06/19/2017    A1C 6.0 09/02/2015            Last Eye Exam: 3-4 years ago  Dilated Previously: Yes, side effects of dilation explained today    What are you currently using to see?  Glasses while driving    Distance Vision Acuity: Noticed gradual change in both eyes    Near Vision Acuity: Satisfied with vision while reading and using computer unaided    Eye Comfort: good  Do you use eye drops? 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       OBJECTIVE: See Ophthalmology exam    ASSESSMENT:    ICD-10-CM    1. Type 2 diabetes mellitus without complication, without long-term current use of insulin (H) - Both Eyes  E11.9 Adult Eye  Referral     REFRACTION     EYE EXAM (SIMPLE-NONBILLABLE)      2. Myopia, bilateral - Both Eyes  H52.13 REFRACTION     EYE EXAM (SIMPLE-NONBILLABLE)      3. Regular astigmatism of both eyes - Both Eyes  H52.223 REFRACTION     EYE EXAM (SIMPLE-NONBILLABLE)          PLAN:    Reji Lockwood aware  eye exam results will be sent to Fadia Sauceda.  Patient Instructions   Patient Education  Diabetes weakens the blood vessels all over the body, including the eyes. Damage to the blood vessels in the eyes can cause swelling or bleeding into part of the eye (called the retina). This is called diabetic retinopathy ([facility]-tin[facility]puh-thee). If not treated, this disease can cause vision loss or blindness.   Symptoms may include blurred or distorted vision, but many people have no symptoms. It's important to see your eye doctor regularly to check for problems.    Early treatment and good control can help protect your vision. Here are the things you can do to help prevent vision loss:      1. Keep your blood sugar levels under tight control.      2. Bring high blood pressure under control.      3. No smoking.      4. Have yearly dilated eye exams.       Myopia is a result of long eyes. It is commonly referred to as near-sightedness. Seeing clearly in the distance is the main challenge.    Astigmatism results from curvature differential in the cornea and crystalline lens which can cause a distorted image, as light rays are prevented from meeting at a common focus.    Eyeglass prescription given.    The affects of the dilating drops last for 4- 6 hours.  You will be more sensitive to light and vision will be blurry up close.  Do not drive if you do not feel comfortable.  Mydriatic sunglasses were given if needed.    Recommend annual eye exams.    Janiya Olvera O.D.  [facility]   [address] [place] [address]    [phone]

## 2022-12-15 NOTE — PATIENT INSTRUCTIONS
Patient Education   Diabetes weakens the blood vessels all over the body, including the eyes. Damage to the blood vessels in the eyes can cause swelling or bleeding into part of the eye (called the retina). This is called diabetic retinopathy (DAR-tin-AH-pu-thee). If not treated, this disease can cause vision loss or blindness.   Symptoms may include blurred or distorted vision, but many people have no symptoms. It's important to see your eye doctor regularly to check for problems.   Early treatment and good control can help protect your vision. Here are the things you can do to help prevent vision loss:      1. Keep your blood sugar levels under tight control.      2. Bring high blood pressure under control.      3. No smoking.      4. Have yearly dilated eye exams.       Myopia is a result of long eyes. It is commonly referred to as near-sightedness. Seeing clearly in the distance is the main challenge.    Astigmatism results from curvature differential in the cornea and crystalline lens which can cause a distorted image, as light rays are prevented from meeting at a common focus.    Eyeglass prescription given.    The affects of the dilating drops last for 4- 6 hours.  You will be more sensitive to light and vision will be blurry up close.  Do not drive if you do not feel comfortable.  Mydriatic sunglasses were given if needed.    Recommend annual eye exams.    Janiya Olvera O.D.  [address] [address]    [phone]

## 2022-12-15 NOTE — LETTER
12/15/2022         RE: Reji Lockwood  [address] [place] [address]        Dear Colleague,    Thank you for referring your patient, Reji Lockwood, to the [facility]. Please see a copy of my visit note below.    Chief Complaint   Patient presents with     Diabetic Eye Exam     Accompanied by   Chief Complaint(s) and History of Present Illness(es)     Diabetic Eye Exam            Diabetes Type: Type 2               Lab Results   Component Value Date    A1C 9.7 11/15/2022    A1C 5.7 01/21/2019    A1C 5.6 10/18/2017    A1C 5.7 06/19/2017    A1C 6.0 09/02/2015            Last Eye Exam: 3-4 years ago  Dilated Previously: Yes, side effects of dilation explained today    What are you currently using to see?  Glasses while driving    Distance Vision Acuity: Noticed gradual change in both eyes    Near Vision Acuity: Satisfied with vision while reading and using computer unaided    Eye Comfort: good  Do you use eye drops? 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       OBJECTIVE: See Ophthalmology exam    ASSESSMENT:    ICD-10-CM    1. Type 2 diabetes mellitus without complication, without long-term current use of insulin (H) - Both Eyes  E11.9 Adult Eye  Referral     REFRACTION     EYE EXAM (SIMPLE-NONBILLABLE)      2. Myopia, bilateral - Both Eyes  H52.13 REFRACTION     EYE EXAM (SIMPLE-NONBILLABLE)      3. Regular astigmatism of both eyes - Both Eyes  H52.223 REFRACTION     EYE EXAM (SIMPLE-NONBILLABLE)          PLAN:    Reji Lockwood aware  eye exam results will be sent to Fadia Sauceda.  Patient Instructions   Patient Education  Diabetes weakens the blood vessels all over the body, including the eyes. Damage to the blood vessels in the eyes can cause swelling or bleeding into part of the eye (called the retina). This is called diabetic retinopathy  (DAR-tin-AH-puh-thee). If not treated, this disease can cause vision loss or blindness.   Symptoms may include blurred or distorted vision, but many people have no symptoms. It's important to see your eye doctor regularly to check for problems.   Early treatment and good control can help protect your vision. Here are the things you can do to help prevent vision loss:      1. Keep your blood sugar levels under tight control.      2. Bring high blood pressure under control.      3. No smoking.      4. Have yearly dilated eye exams.       Myopia is a result of long eyes. It is commonly referred to as near-sightedness. Seeing clearly in the distance is the main challenge.    Astigmatism results from curvature differential in the cornea and crystalline lens which can cause a distorted image, as light rays are prevented from meeting at a common focus.    Eyeglass prescription given.    The affects of the dilating drops last for 4- 6 hours.  You will be more sensitive to light and vision will be blurry up close.  Do not drive if you do not feel comfortable.  Mydriatic sunglasses were given if needed.    Recommend annual eye exams.    Janiya Olvera O.D.  [facility]   [address] [place] [address]    [phone]               Again, thank you for allowing me to participate in the care of your patient.        Sincerely,        Janiya Olvera OD

## 2022-12-26 ENCOUNTER — APPOINTMENT (OUTPATIENT)
Dept: OPTOMETRY | Facility: CLINIC | Age: 42
End: 2022-12-26
Payer: COMMERCIAL

## 2022-12-26 PROCEDURE — 92340 FIT SPECTACLES MONOFOCAL: CPT | Performed by: OPTOMETRIST

## 2023-01-19 ENCOUNTER — ANCILLARY PROCEDURE (OUTPATIENT)
Dept: MAMMOGRAPHY | Facility: CLINIC | Age: 43
End: 2023-01-19
Attending: NURSE PRACTITIONER
Payer: COMMERCIAL

## 2023-01-19 DIAGNOSIS — Z12.31 ENCOUNTER FOR SCREENING MAMMOGRAM FOR BREAST CANCER: ICD-10-CM

## 2023-01-19 PROCEDURE — 77067 SCR MAMMO BI INCL CAD: CPT | Mod: GC | Performed by: STUDENT IN AN ORGANIZED HEALTH CARE EDUCATION/TRAINING PROGRAM

## 2023-01-27 DIAGNOSIS — E11.9 TYPE 2 DIABETES MELLITUS WITHOUT COMPLICATION, WITHOUT LONG-TERM CURRENT USE OF INSULIN (H): ICD-10-CM

## 2023-01-27 RX ORDER — LIRAGLUTIDE 6 MG/ML
INJECTION SUBCUTANEOUS
Qty: 6 ML | Refills: 0 | Status: SHIPPED | OUTPATIENT
Start: 2023-01-27 | End: 2023-02-23

## 2023-01-27 NOTE — TELEPHONE ENCOUNTER
Called and spoke to patient next available appointment is after patient will run out of medication she is wanting to be seen sooner

## 2023-02-08 ENCOUNTER — TELEPHONE (OUTPATIENT)
Dept: FAMILY MEDICINE | Facility: CLINIC | Age: 43
End: 2023-02-08
Payer: COMMERCIAL

## 2023-02-08 NOTE — TELEPHONE ENCOUNTER
Patient Quality Outreach    Patient is due for the following:   Diabetes -  A1C and BP Check    Next Steps:   Patient has upcoming appointment, these items will be addressed at that time.    Type of outreach:    Chart review performed, no outreach needed.      Questions for provider review:    None     ALEYDA Lara JrA

## 2023-02-23 ENCOUNTER — VIRTUAL VISIT (OUTPATIENT)
Dept: FAMILY MEDICINE | Facility: CLINIC | Age: 43
End: 2023-02-23
Payer: COMMERCIAL

## 2023-02-23 DIAGNOSIS — B18.1 CHRONIC VIRAL HEPATITIS B WITHOUT DELTA AGENT AND WITHOUT COMA (H): ICD-10-CM

## 2023-02-23 DIAGNOSIS — E11.9 TYPE 2 DIABETES MELLITUS WITHOUT COMPLICATION, WITHOUT LONG-TERM CURRENT USE OF INSULIN (H): Primary | ICD-10-CM

## 2023-02-23 PROCEDURE — 99441 PR PHYSICIAN TELEPHONE EVALUATION 5-10 MIN: CPT | Mod: 93 | Performed by: NURSE PRACTITIONER

## 2023-02-23 RX ORDER — LIRAGLUTIDE 6 MG/ML
1.2 INJECTION SUBCUTANEOUS DAILY
Qty: 18 ML | Refills: 0 | Status: SHIPPED | OUTPATIENT
Start: 2023-02-23 | End: 2023-05-31

## 2023-02-23 NOTE — PROGRESS NOTES
Reji is a 42 year old who is being evaluated via a billable telephone visit.      What phone number would you like to be contacted at? [phone]  How would you like to obtain your AVS? Mail a copy  Distant Location (provider location):  Off-site    Assessment & Plan     Type 2 diabetes mellitus without complication, without long-term current use of insulin (H)  Due for labs - she will do lab only visit. Refilled Victoza. If a1c >8, needs follow up in 3 months. If <8 ok to follow up in 6 months.  - Basic metabolic panel  (Ca, Cl, CO2, Creat, Gluc, K, Na, BUN); Future  - Hemoglobin A1c; Future  - liraglutide (VICTOZA) 18 MG/3ML solution; Inject 1.2 mg Subcutaneous daily    Chronic viral hepatitis B without delta agent and without coma (H)  Has appointment with GI on Monday.               See Patient Instructions    Return in about 3 months (around 5/23/2023).     The benefits, risks and potential side effects were discussed in detail. Black box warnings discussed as relevant. All patient questions were answered. The patient was instructed to follow up immediately if any adverse reactions develop.    Return precautions discussed, including when to seek urgent/emergent care.    Patient verbalizes understanding and agrees with plan of care.     ARVIND LING [facility]    Noah Mendoza is a 42 year old accompanied by her phone , presenting for the following health issues:  Diabetes      History of Present Illness       Diabetes:   She presents for follow up of diabetes.  She is checking home blood glucose one time daily. She checks blood glucose before meals.  Blood glucose is never over 200 and never under 70. When her blood glucose is low, the patient is asymptomatic for confusion, blurred vision, lethargy and reports not feeling dizzy, shaky, or weak.  She has no concerns regarding her diabetes at this time.  She is having weight gain.         She eats 2-3  servings of fruits and vegetables daily.She consumes 0 sweetened beverage(s) daily.She exercises with enough effort to increase her heart rate 60 or more minutes per day.  She exercises with enough effort to increase her heart rate 4 days per week.   She is taking medications regularly.         Taking Victoza 1.2 mg daily  -120 by her report  No polyuria, polydipsia or polyphagia  Initially had some nausea with Victoza which has since resolved    Has appointment with GI on Monday for Hep B/elevated LFTs    Review of Systems   Constitutional, HEENT, cardiovascular, pulmonary, gi and gu systems are negative, except as otherwise noted.      Objective           Vitals:  No vitals were obtained today due to virtual visit.    Physical Exam   healthy, alert and no distress  PSYCH: Alert and oriented times 3; coherent speech, normal   rate and volume, able to articulate logical thoughts, able   to abstract reason, no tangential thoughts, no hallucinations   or delusions  Her affect is normal  RESP: No cough, no audible wheezing, able to talk in full sentences  Remainder of exam unable to be completed due to telephone visits                Phone call duration: 9 minutes

## 2023-02-27 ENCOUNTER — OFFICE VISIT (OUTPATIENT)
Dept: GASTROENTEROLOGY | Facility: CLINIC | Age: 43
End: 2023-02-27
Attending: NURSE PRACTITIONER
Payer: COMMERCIAL

## 2023-02-27 ENCOUNTER — VIRTUAL VISIT (OUTPATIENT)
Dept: INTERPRETER SERVICES | Facility: CLINIC | Age: 43
End: 2023-02-27
Payer: COMMERCIAL

## 2023-02-27 VITALS
OXYGEN SATURATION: 99 % | HEIGHT: 58 IN | WEIGHT: 138.5 LBS | DIASTOLIC BLOOD PRESSURE: 84 MMHG | SYSTOLIC BLOOD PRESSURE: 133 MMHG | HEART RATE: 75 BPM | BODY MASS INDEX: 29.07 KG/M2

## 2023-02-27 DIAGNOSIS — B18.1 CHRONIC VIRAL HEPATITIS B WITHOUT DELTA AGENT AND WITHOUT COMA (H): ICD-10-CM

## 2023-02-27 DIAGNOSIS — E11.9 TYPE 2 DIABETES MELLITUS WITHOUT COMPLICATION, WITHOUT LONG-TERM CURRENT USE OF INSULIN (H): Primary | ICD-10-CM

## 2023-02-27 DIAGNOSIS — E11.9 TYPE 2 DIABETES MELLITUS WITHOUT COMPLICATION, WITHOUT LONG-TERM CURRENT USE OF INSULIN (H): ICD-10-CM

## 2023-02-27 LAB
AFP SERPL-MCNC: 2.9 NG/ML
ALBUMIN SERPL-MCNC: 3.5 G/DL (ref 3.4–5)
ALP SERPL-CCNC: 86 U/L (ref 40–150)
ALT SERPL W P-5'-P-CCNC: 151 U/L (ref 0–50)
ANION GAP SERPL CALCULATED.3IONS-SCNC: 4 MMOL/L (ref 3–14)
AST SERPL W P-5'-P-CCNC: 74 U/L (ref 0–45)
BILIRUB DIRECT SERPL-MCNC: 0.1 MG/DL (ref 0–0.2)
BILIRUB SERPL-MCNC: 0.5 MG/DL (ref 0.2–1.3)
BUN SERPL-MCNC: 9 MG/DL (ref 7–30)
CALCIUM SERPL-MCNC: 9.2 MG/DL (ref 8.5–10.1)
CHLORIDE BLD-SCNC: 106 MMOL/L (ref 94–109)
CO2 SERPL-SCNC: 26 MMOL/L (ref 20–32)
CREAT SERPL-MCNC: 0.49 MG/DL (ref 0.52–1.04)
ERYTHROCYTE [DISTWIDTH] IN BLOOD BY AUTOMATED COUNT: 12.2 % (ref 10–15)
GFR SERPL CREATININE-BSD FRML MDRD: >90 ML/MIN/1.73M2
GLUCOSE BLD-MCNC: 119 MG/DL (ref 70–99)
HBA1C MFR BLD: 6.9 % (ref 0–5.6)
HCT VFR BLD AUTO: 43.7 % (ref 35–47)
HGB BLD-MCNC: 14.7 G/DL (ref 11.7–15.7)
INR PPP: 1.08 (ref 0.85–1.15)
MCH RBC QN AUTO: 30.1 PG (ref 26.5–33)
MCHC RBC AUTO-ENTMCNC: 33.6 G/DL (ref 31.5–36.5)
MCV RBC AUTO: 90 FL (ref 78–100)
PLATELET # BLD AUTO: 201 10E3/UL (ref 150–450)
POTASSIUM BLD-SCNC: 3.7 MMOL/L (ref 3.4–5.3)
PROT SERPL-MCNC: 7.4 G/DL (ref 6.8–8.8)
RBC # BLD AUTO: 4.88 10E6/UL (ref 3.8–5.2)
SODIUM SERPL-SCNC: 136 MMOL/L (ref 133–144)
WBC # BLD AUTO: 7.7 10E3/UL (ref 4–11)

## 2023-02-27 PROCEDURE — 85610 PROTHROMBIN TIME: CPT | Performed by: STUDENT IN AN ORGANIZED HEALTH CARE EDUCATION/TRAINING PROGRAM

## 2023-02-27 PROCEDURE — 85027 COMPLETE CBC AUTOMATED: CPT | Performed by: PATHOLOGY

## 2023-02-27 PROCEDURE — 99207 PR NO CHARGE LOS: CPT | Mod: 93 | Performed by: STUDENT IN AN ORGANIZED HEALTH CARE EDUCATION/TRAINING PROGRAM

## 2023-02-27 PROCEDURE — 82105 ALPHA-FETOPROTEIN SERUM: CPT | Performed by: STUDENT IN AN ORGANIZED HEALTH CARE EDUCATION/TRAINING PROGRAM

## 2023-02-27 PROCEDURE — 85027 COMPLETE CBC AUTOMATED: CPT | Performed by: STUDENT IN AN ORGANIZED HEALTH CARE EDUCATION/TRAINING PROGRAM

## 2023-02-27 PROCEDURE — 86692 HEPATITIS DELTA AGENT ANTBDY: CPT | Performed by: STUDENT IN AN ORGANIZED HEALTH CARE EDUCATION/TRAINING PROGRAM

## 2023-02-27 PROCEDURE — 85610 PROTHROMBIN TIME: CPT | Performed by: PATHOLOGY

## 2023-02-27 PROCEDURE — 80053 COMPREHEN METABOLIC PANEL: CPT | Mod: 91 | Performed by: PATHOLOGY

## 2023-02-27 PROCEDURE — 82248 BILIRUBIN DIRECT: CPT | Performed by: PATHOLOGY

## 2023-02-27 PROCEDURE — 87517 HEPATITIS B DNA QUANT: CPT | Performed by: STUDENT IN AN ORGANIZED HEALTH CARE EDUCATION/TRAINING PROGRAM

## 2023-02-27 PROCEDURE — 83036 HEMOGLOBIN GLYCOSYLATED A1C: CPT | Mod: 91 | Performed by: PATHOLOGY

## 2023-02-27 PROCEDURE — 99204 OFFICE O/P NEW MOD 45 MIN: CPT | Performed by: STUDENT IN AN ORGANIZED HEALTH CARE EDUCATION/TRAINING PROGRAM

## 2023-02-27 PROCEDURE — 86692 HEPATITIS DELTA AGENT ANTBDY: CPT | Performed by: PATHOLOGY

## 2023-02-27 PROCEDURE — 82248 BILIRUBIN DIRECT: CPT | Performed by: STUDENT IN AN ORGANIZED HEALTH CARE EDUCATION/TRAINING PROGRAM

## 2023-02-27 PROCEDURE — 36415 COLL VENOUS BLD VENIPUNCTURE: CPT | Performed by: PATHOLOGY

## 2023-02-27 RX ORDER — TENOFOVIR DISOPROXIL FUMARATE 300 MG/1
300 TABLET, FILM COATED ORAL DAILY
Qty: 90 TABLET | Refills: 3 | Status: SHIPPED | OUTPATIENT
Start: 2023-02-27 | End: 2024-02-27

## 2023-02-27 ASSESSMENT — PAIN SCALES - GENERAL: PAINLEVEL: NO PAIN (0)

## 2023-02-27 NOTE — PROGRESS NOTES
[facility] New Patient Visit    Date of Visit: 2023    Reason for referral: hepatitis B    Subjective: Ms. Lockwood is a 42 year old woman with a history of chronic hepatitis B, who presents for follow up of her hepatitis B.     Was first found to have hepatitis B in  when she was treated during pregnancy. Was seen in our hepatology clinic. Took tenofovir, stopped after she ran out after pregnancy. Last seen in this clinic in 2018.      2022  Hepatitis B dna > 170,000,000, has been this high for years  2022 , ASST 65, previously normal    She has two children, one received ppx for hepatitis B after birth. Lives with partner and two kids.     No known history of liver decompensation or issues related to her hepatitis B.     Very rare alcohol use with family functions.     No family history of hepatitis b, liver disease or liver cancer.     ROS: 14 point ROS negative except for positives noted in HPI.    PMHx:  Past Medical History:   Diagnosis Date     Chronic viral hepatitis B without delta agent and without coma (H)      History of transfusion of packed red blood cells      Type 2 diabetes mellitus (H) 3/10/2018       PSHx:  Past Surgical History:   Procedure Laterality Date     CERCLAGE CERVICAL N/A 2017    Procedure: CERCLAGE CERVICAL;  Cervical Cerclage ;  Surgeon: Ez Erazo MD;  Location: [facility]     HC TREATMENT MISSED  SURG, 1ST TRIMESTER         FamHx:  Family History   Problem Relation Age of Onset     Kidney Disease Mother      Unknown/Adopted Father      No family history of liver disease, liver cancer    SocHx:  Social History     Socioeconomic History     Marital status:      Spouse name: jessica Va     Number of children: 2     Years of education: Not on file     Highest education level: Not on file   Occupational History     Occupation:    Tobacco Use     Smoking status: Never     Smokeless  "tobacco: Never   Vaping Use     Vaping Use: Never used   Substance and Sexual Activity     Alcohol use: No     Alcohol/week: 0.0 standard drinks     Drug use: No     Sexual activity: Yes     Partners: Male     Birth control/protection: None   Other Topics Concern     Parent/sibling w/ CABG, MI or angioplasty before 65F 55M? Not Asked   Social History Narrative    From [place] to [place] to [place] in [place].     [place].      Social Determinants of Health     Financial Resource Strain: Not on file   Food Insecurity: Not on file   Transportation Needs: Not on file   Physical Activity: Not on file   Stress: Not on file   Social Connections: Not on file   Intimate Partner Violence: Not on file   Housing Stability: Not on file       Medications:  Current Outpatient Medications   Medication     alcohol swab prep pads     blood glucose (NO BRAND SPECIFIED) test strip     blood glucose calibration (NO BRAND SPECIFIED) solution     blood glucose monitoring (NO BRAND SPECIFIED) meter device kit     insulin pen needle (31G X 6 MM) 31G X 6 MM miscellaneous     liraglutide (VICTOZA) 18 MG/3ML solution     tenofovir (VIREAD) 300 MG tablet     thin (NO BRAND SPECIFIED) lancets     No current facility-administered medications for this visit.     No OTCs, herbals    Allergies:  Allergies   Allergen Reactions     Blood Transfusion Related (Informational Only) Other (See Comments)     Patient has a history of a clinically significant antibody against RBC antigens.  A delay in compatible RBCs may occur.     Fish Allergy Hives       Objective:  /84 (BP Location: Left arm, Patient Position: Sitting, Cuff Size: Adult Regular)   Pulse 75   Ht 1.473 m (4' 10\")   Wt 62.8 kg (138 lb 8 oz)   LMP 02/04/2023 (Approximate)   SpO2 99%   BMI 28.95 kg/m     /84 (BP Location: Left arm, Patient Position: Sitting, Cuff Size: Adult Regular)   Pulse 75   Ht 1.473 m (4' 10\")   Wt 62.8 kg (138 lb 8 oz)   LMP 02/04/2023 (Approximate)   SpO2 " 99%   BMI 28.95 kg/m    Constitutional: pleasant woman in NAD  Eyes: non icteric  Respiratory: Normal respiratory excursion   MSK: normal range of motion of visualized extremities  Abd: Non distended  Skin: No jaundice  Psychiatric: normal mood and orientation    Labs:  Last Comprehensive Metabolic Panel:  Sodium   Date Value Ref Range Status   11/15/2022 137 133 - 144 mmol/L Final   03/08/2018 139 133 - 144 mmol/L Final     Potassium   Date Value Ref Range Status   11/15/2022 3.5 3.4 - 5.3 mmol/L Final   03/08/2018 4.0 3.4 - 5.3 mmol/L Final     Chloride   Date Value Ref Range Status   11/15/2022 103 94 - 109 mmol/L Final   03/08/2018 103 94 - 109 mmol/L Final     Carbon Dioxide   Date Value Ref Range Status   03/08/2018 30 20 - 32 mmol/L Final     Carbon Dioxide (CO2)   Date Value Ref Range Status   11/15/2022 29 20 - 32 mmol/L Final     Anion Gap   Date Value Ref Range Status   11/15/2022 5 3 - 14 mmol/L Final   03/08/2018 6 3 - 14 mmol/L Final     Glucose   Date Value Ref Range Status   11/15/2022 190 (H) 70 - 99 mg/dL Final   01/21/2019 124 (H) 70 - 99 mg/dL Final     Urea Nitrogen   Date Value Ref Range Status   11/15/2022 11 7 - 30 mg/dL Final   03/08/2018 17 7 - 30 mg/dL Final     Creatinine   Date Value Ref Range Status   11/15/2022 0.56 0.52 - 1.04 mg/dL Final   03/08/2018 0.68 0.52 - 1.04 mg/dL Final     GFR Estimate   Date Value Ref Range Status   11/15/2022 >90 >60 mL/min/1.73m2 Final     Comment:     Effective December 21, 2021 eGFRcr in adults is calculated using the 2021 CKD-EPI creatinine equation which includes age and gender (Graciela et al., NEJM, DOI: 10.1056/PPKOca3967857)   03/08/2018 >90 >60 mL/min/1.7m2 Final     Comment:     Non  GFR Calc     Calcium   Date Value Ref Range Status   11/15/2022 9.0 8.5 - 10.1 mg/dL Final   03/08/2018 9.1 8.5 - 10.1 mg/dL Final     Bilirubin Total   Date Value Ref Range Status   11/15/2022 0.6 0.2 - 1.3 mg/dL Final   05/03/2018 0.5 0.2 - 1.3  mg/dL Final     Alkaline Phosphatase   Date Value Ref Range Status   11/15/2022 93 40 - 150 U/L Final   05/03/2018 74 40 - 150 U/L Final     ALT   Date Value Ref Range Status   11/15/2022 130 (H) 0 - 50 U/L Final   05/03/2018 29 0 - 50 U/L Final     AST   Date Value Ref Range Status   11/15/2022 63 (H) 0 - 45 U/L Final   05/03/2018 18 0 - 45 U/L Final       Lab Results   Component Value Date    WBC 6.1 03/08/2018     Lab Results   Component Value Date    RBC 5.09 03/08/2018     Lab Results   Component Value Date    HGB 14.5 03/08/2018     Lab Results   Component Value Date    HCT 44.8 03/08/2018     Lab Results   Component Value Date    MCV 88 03/08/2018     Lab Results   Component Value Date    MCH 28.5 03/08/2018     Lab Results   Component Value Date    MCHC 32.4 03/08/2018     Lab Results   Component Value Date    RDW 12.2 03/08/2018     Lab Results   Component Value Date     03/08/2018       INR   Date Value Ref Range Status   03/08/2018 0.95 0.86 - 1.14 Final        Computed MELD-Na score unavailable. Necessary lab results were not found in the last year.  Computed MELD score unavailable. Necessary lab results were not found in the last year.    Imaging:    RU US 10/2017    Liver: There is mild nodularity to the liver capsule. No discrete  mass. It measures 12.7 cm in the sagittal dimension.      Extrahepatic portal vein flow is antegrade, measuring 25 cm/sec.  Left portal vein flow is antegrade, measuring 29 cm/sec.  Right portal vein flow is antegrade, measuring 25 cm/sec.     Flow in the hepatic artery is towards the liver and:  60 cm/sec peak systolic  0.7 resistive index.      The splenic vein is patent and flow is towards the liver.  The left,  middle, and right hepatic veins are patent with flow towards the IVC.  The IVC is patent with flow towards the heart.   The aorta is not  dilated.     Gallbladder: The gallbladder is well distended and of normal  morphology. No stones are detected. Mild  prominence of the gallbladder  wall, likely related to the presence of ascites and underlying liver  disease.     Bile Ducts: Both the intra- and extrahepatic biliary system are of  normal caliber.  The common bile duct measures 2 mm in diameter.     Pancreas: Visualized portions of the head and body of the pancreas are  unremarkable.      Kidneys: Both kidneys are of normal echotexture, without mass nor  hydronephrosis.   The craniocaudal dimensions are: right- 10.4 cm,  left- 11 cm.     Spleen: The spleen measures 9.7 cm in sagittal dimension.     Fluid: No evidence of ascites or pleural effusions.                                                                      Impression:   1.  Patent Doppler assessment.  2.  Mild nodularity to the liver capsule without discrete mass.    Independently reviewed labs and imaging.     Assessment/Plan: Ms. Lockwood is a 42 year old woman with a history of chronic hepatitis B, who presents for follow up of her hepatitis B.     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  More recently her liver enzymes became elevated, while she may have some contribution from nonalcoholic fatty liver disease, given this would elect to start her on treatment with tenofovir.  Discussed treatment of hepatitis B is generally lifelong, stopping treatment early can be associate with flares.  Of note her ultrasound from 2017 showed a mildly nodular appearing liver, no other signs of portal hypertension.  We will repeat.    -Tenofovir 300 mg daily  -Repeat liver enzymes and viral load today.  Check AFP and right quadrant ultrasound.  We will screen for liver cancer every 6 months with ultrasound and AFP if her ultrasound shows signs of cirrhosis.  -Recommend family numbers be tested for hepatitis B, recommend discussion of hepatitis B with her children's pediatricians.    Orders Placed This Encounter   Procedures     US Abdomen Limited     US  Abdomen Limited     Hepatic panel (Albumin, ALT, AST, Bili, Alk Phos, TP)     AFP tumor marker     CBC with platelets     INR     Hep B Virus DNA Quant Real Time PCR     Hep B Virus DNA Quant Real Time PCR     Hepatic panel (Albumin, ALT, AST, Bili, Alk Phos, TP)     CBC with platelets     INR     Hemoglobin A1c     Basic metabolic panel  (Ca, Cl, CO2, Creat, Gluc, K, Na, BUN)       RTC 6 months with labs and RUQ US     Ca Harris MD MS  Hepatology/Liver Transplant  [facility]

## 2023-02-27 NOTE — NURSING NOTE
"Chief Complaint   Patient presents with     New Patient     New consult for chronic viral hepatitis B     She requests these members of her care team be copied on today's visit information: pcp    PCP: Eloisa Bruno    Referring Provider:  ARVIND Jeffries CNP  [id number] [place] [address]    Vitals:    02/27/23 1136   BP: 133/84   BP Location: Left arm   Patient Position: Sitting   Cuff Size: Adult Regular   Pulse: 75   SpO2: 99%   Weight: 62.8 kg (138 lb 8 oz)   Height: 1.473 m (4' 10\")       Body mass index is 28.95 kg/m .    Medications were reconciled.    Does patient need any medication refills at today's visit? none      Francy Caal CMA     "

## 2023-02-27 NOTE — LETTER
2023         RE: Reji Lockwood  [address] [place] [address]        Dear Colleague,    Thank you for referring your patient, Reji Lockwood, to the [facility]. Please see a copy of my visit note below.    [facility] New Patient Visit    Date of Visit: 2023    Reason for referral: hepatitis B    Subjective: Ms. Lockwood is a 42 year old woman with a history of chronic hepatitis B, who presents for follow up of her hepatitis B.     Was first found to have hepatitis B in 2017 when she was treated during pregnancy. Was seen in our hepatology clinic. Took tenofovir, stopped after she ran out after pregnancy. Last seen in this clinic in 2018.      2022  Hepatitis B dna > 170,000,000, has been this high for years  2022 , ASST 65, previously normal    She has two children, one received ppx for hepatitis B after birth. Lives with partner and two kids.     No known history of liver decompensation or issues related to her hepatitis B.     Very rare alcohol use with family functions.     No family history of hepatitis b, liver disease or liver cancer.     ROS: 14 point ROS negative except for positives noted in HPI.    PMHx:  Past Medical History:   Diagnosis Date     Chronic viral hepatitis B without delta agent and without coma (H)      History of transfusion of packed red blood cells 2019     Type 2 diabetes mellitus (H) 3/10/2018       PSHx:  Past Surgical History:   Procedure Laterality Date     CERCLAGE CERVICAL N/A 2017    Procedure: CERCLAGE CERVICAL;  Cervical Cerclage ;  Surgeon: Ez Erazo MD;  Location: [facility]     HC TREATMENT MISSED  SURG, 1ST TRIMESTER         FamHx:  Family History   Problem Relation Age of Onset     Kidney Disease Mother      Unknown/Adopted Father      No family history of liver disease, liver cancer    SocHx:  Social History     Socioeconomic History     Marital status:       "Spouse name: gerardo Pozo     Number of children: 2     Years of education: Not on file     Highest education level: Not on file   Occupational History     Occupation:    Tobacco Use     Smoking status: Never     Smokeless tobacco: Never   Vaping Use     Vaping Use: Never used   Substance and Sexual Activity     Alcohol use: No     Alcohol/week: 0.0 standard drinks     Drug use: No     Sexual activity: Yes     Partners: Male     Birth control/protection: None   Other Topics Concern     Parent/sibling w/ CABG, MI or angioplasty before 65F 55M? Not Asked   Social History Narrative    From [place] to [place] to [place] in [place].     [place].      Social Determinants of Health     Financial Resource Strain: Not on file   Food Insecurity: Not on file   Transportation Needs: Not on file   Physical Activity: Not on file   Stress: Not on file   Social Connections: Not on file   Intimate Partner Violence: Not on file   Housing Stability: Not on file       Medications:  Current Outpatient Medications   Medication     alcohol swab prep pads     blood glucose (NO BRAND SPECIFIED) test strip     blood glucose calibration (NO BRAND SPECIFIED) solution     blood glucose monitoring (NO BRAND SPECIFIED) meter device kit     insulin pen needle (31G X 6 MM) 31G X 6 MM miscellaneous     liraglutide (VICTOZA) 18 MG/3ML solution     tenofovir (VIREAD) 300 MG tablet     thin (NO BRAND SPECIFIED) lancets     No current facility-administered medications for this visit.     No OTCs, herbals    Allergies:  Allergies   Allergen Reactions     Blood Transfusion Related (Informational Only) Other (See Comments)     Patient has a history of a clinically significant antibody against RBC antigens.  A delay in compatible RBCs may occur.     Fish Allergy Hives       Objective:  /84 (BP Location: Left arm, Patient Position: Sitting, Cuff Size: Adult Regular)   Pulse 75   Ht 1.473 m (4' 10\")   Wt 62.8 kg (138 lb 8 oz)   LMP 02/04/2023 " "(Approximate)   SpO2 99%   BMI 28.95 kg/m     /84 (BP Location: Left arm, Patient Position: Sitting, Cuff Size: Adult Regular)   Pulse 75   Ht 1.473 m (4' 10\")   Wt 62.8 kg (138 lb 8 oz)   LMP 02/04/2023 (Approximate)   SpO2 99%   BMI 28.95 kg/m    Constitutional: pleasant woman in NAD  Eyes: non icteric  Respiratory: Normal respiratory excursion   MSK: normal range of motion of visualized extremities  Abd: Non distended  Skin: No jaundice  Psychiatric: normal mood and orientation    Labs:  Last Comprehensive Metabolic Panel:  Sodium   Date Value Ref Range Status   11/15/2022 137 133 - 144 mmol/L Final   03/08/2018 139 133 - 144 mmol/L Final     Potassium   Date Value Ref Range Status   11/15/2022 3.5 3.4 - 5.3 mmol/L Final   03/08/2018 4.0 3.4 - 5.3 mmol/L Final     Chloride   Date Value Ref Range Status   11/15/2022 103 94 - 109 mmol/L Final   03/08/2018 103 94 - 109 mmol/L Final     Carbon Dioxide   Date Value Ref Range Status   03/08/2018 30 20 - 32 mmol/L Final     Carbon Dioxide (CO2)   Date Value Ref Range Status   11/15/2022 29 20 - 32 mmol/L Final     Anion Gap   Date Value Ref Range Status   11/15/2022 5 3 - 14 mmol/L Final   03/08/2018 6 3 - 14 mmol/L Final     Glucose   Date Value Ref Range Status   11/15/2022 190 (H) 70 - 99 mg/dL Final   01/21/2019 124 (H) 70 - 99 mg/dL Final     Urea Nitrogen   Date Value Ref Range Status   11/15/2022 11 7 - 30 mg/dL Final   03/08/2018 17 7 - 30 mg/dL Final     Creatinine   Date Value Ref Range Status   11/15/2022 0.56 0.52 - 1.04 mg/dL Final   03/08/2018 0.68 0.52 - 1.04 mg/dL Final     GFR Estimate   Date Value Ref Range Status   11/15/2022 >90 >60 mL/min/1.73m2 Final     Comment:     Effective December 21, 2021 eGFRcr in adults is calculated using the 2021 CKD-EPI creatinine equation which includes age and gender (Graciela et al., NEJM, DOI: 10.1056/AZMGgx7425214)   03/08/2018 >90 >60 mL/min/1.7m2 Final     Comment:     Non  GFR Calc "     Calcium   Date Value Ref Range Status   11/15/2022 9.0 8.5 - 10.1 mg/dL Final   03/08/2018 9.1 8.5 - 10.1 mg/dL Final     Bilirubin Total   Date Value Ref Range Status   11/15/2022 0.6 0.2 - 1.3 mg/dL Final   05/03/2018 0.5 0.2 - 1.3 mg/dL Final     Alkaline Phosphatase   Date Value Ref Range Status   11/15/2022 93 40 - 150 U/L Final   05/03/2018 74 40 - 150 U/L Final     ALT   Date Value Ref Range Status   11/15/2022 130 (H) 0 - 50 U/L Final   05/03/2018 29 0 - 50 U/L Final     AST   Date Value Ref Range Status   11/15/2022 63 (H) 0 - 45 U/L Final   05/03/2018 18 0 - 45 U/L Final       Lab Results   Component Value Date    WBC 6.1 03/08/2018     Lab Results   Component Value Date    RBC 5.09 03/08/2018     Lab Results   Component Value Date    HGB 14.5 03/08/2018     Lab Results   Component Value Date    HCT 44.8 03/08/2018     Lab Results   Component Value Date    MCV 88 03/08/2018     Lab Results   Component Value Date    MCH 28.5 03/08/2018     Lab Results   Component Value Date    MCHC 32.4 03/08/2018     Lab Results   Component Value Date    RDW 12.2 03/08/2018     Lab Results   Component Value Date     03/08/2018       INR   Date Value Ref Range Status   03/08/2018 0.95 0.86 - 1.14 Final        Computed MELD-Na score unavailable. Necessary lab results were not found in the last year.  Computed MELD score unavailable. Necessary lab results were not found in the last year.    Imaging:    RU US 10/2017    Liver: There is mild nodularity to the liver capsule. No discrete  mass. It measures 12.7 cm in the sagittal dimension.      Extrahepatic portal vein flow is antegrade, measuring 25 cm/sec.  Left portal vein flow is antegrade, measuring 29 cm/sec.  Right portal vein flow is antegrade, measuring 25 cm/sec.     Flow in the hepatic artery is towards the liver and:  60 cm/sec peak systolic  0.7 resistive index.      The splenic vein is patent and flow is towards the liver.  The left,  middle, and right  hepatic veins are patent with flow towards the IVC.  The IVC is patent with flow towards the heart.   The aorta is not  dilated.     Gallbladder: The gallbladder is well distended and of normal  morphology. No stones are detected. Mild prominence of the gallbladder  wall, likely related to the presence of ascites and underlying liver  disease.     Bile Ducts: Both the intra- and extrahepatic biliary system are of  normal caliber.  The common bile duct measures 2 mm in diameter.     Pancreas: Visualized portions of the head and body of the pancreas are  unremarkable.      Kidneys: Both kidneys are of normal echotexture, without mass nor  hydronephrosis.   The craniocaudal dimensions are: right- 10.4 cm,  left- 11 cm.     Spleen: The spleen measures 9.7 cm in sagittal dimension.     Fluid: No evidence of ascites or pleural effusions.                                                                      Impression:   1.  Patent Doppler assessment.  2.  Mild nodularity to the liver capsule without discrete mass.    Independently reviewed labs and imaging.     Assessment/Plan: Ms. Lockwood is a 42 year old woman with a history of chronic hepatitis B, who presents for follow up of her hepatitis B.     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  More recently her liver enzymes became elevated, while she may have some contribution from nonalcoholic fatty liver disease, given this would elect to start her on treatment with tenofovir.  Discussed treatment of hepatitis B is generally lifelong, stopping treatment early can be associate with flares.  Of note her ultrasound from 2017 showed a mildly nodular appearing liver, no other signs of portal hypertension.  We will repeat.    -Tenofovir 300 mg daily  -Repeat liver enzymes and viral load today.  Check AFP and right quadrant ultrasound.  We will screen for liver cancer every 6 months with ultrasound and AFP if her  ultrasound shows signs of cirrhosis.  -Recommend family numbers be tested for hepatitis B, recommend discussion of hepatitis B with her children's pediatricians.    Orders Placed This Encounter   Procedures     US Abdomen Limited     US Abdomen Limited     Hepatic panel (Albumin, ALT, AST, Bili, Alk Phos, TP)     AFP tumor marker     CBC with platelets     INR     Hep B Virus DNA Quant Real Time PCR     Hep B Virus DNA Quant Real Time PCR     Hepatic panel (Albumin, ALT, AST, Bili, Alk Phos, TP)     CBC with platelets     INR     Hemoglobin A1c     Basic metabolic panel  (Ca, Cl, CO2, Creat, Gluc, K, Na, BUN)       RTC 6 months with labs and RUQ US     Ca Harris MD MS  Hepatology/Liver Transplant  [facility]          Again, thank you for allowing me to participate in the care of your patient.        Sincerely,        Ca Harris MD

## 2023-02-27 NOTE — LETTER
March 2, 2023      Reji Lockwood  [address] [place] [address]        Dear ,    We are writing to inform you of your test results.    Your labs shows your active hepatitis B infection. Start taking tenofovir to treat this, as we discussed in clinic.     Resulted Orders   Basic metabolic panel  (Ca, Cl, CO2, Creat, Gluc, K, Na, BUN)   Result Value Ref Range    Sodium 136 133 - 144 mmol/L    Potassium 3.7 3.4 - 5.3 mmol/L    Chloride 106 94 - 109 mmol/L    Carbon Dioxide (CO2) 26 20 - 32 mmol/L    Anion Gap 4 3 - 14 mmol/L    Urea Nitrogen 9 7 - 30 mg/dL    Creatinine 0.49 (L) 0.52 - 1.04 mg/dL    Calcium 9.2 8.5 - 10.1 mg/dL    Glucose 119 (H) 70 - 99 mg/dL    GFR Estimate >90 >60 mL/min/1.73m2      Comment:      eGFR calculated using 2021 CKD-EPI equation.   Hemoglobin A1c   Result Value Ref Range    Hemoglobin A1C 6.9 (H) 0.0 - 5.6 %   Hepatic panel (Albumin, ALT, AST, Bili, Alk Phos, TP)   Result Value Ref Range    Bilirubin Total 0.5 0.2 - 1.3 mg/dL    Bilirubin Direct 0.1 0.0 - 0.2 mg/dL    Protein Total 7.4 6.8 - 8.8 g/dL    Albumin 3.5 3.4 - 5.0 g/dL    Alkaline Phosphatase 86 40 - 150 U/L    AST 74 (H) 0 - 45 U/L     (H) 0 - 50 U/L   AFP tumor marker   Result Value Ref Range    AFP tumor marker 2.9 <=8.3 ng/mL    Narrative    This result is obtained using the Roche Elecsys AFP method on  the theodora e801 immunoassay analyzer. Results obtained with different assay methods or kits cannot be used interchangeably.  Reference ranges apply to non-pregnant females only.   CBC with platelets   Result Value Ref Range    WBC Count 7.7 4.0 - 11.0 10e3/uL    RBC Count 4.88 3.80 - 5.20 10e6/uL    Hemoglobin 14.7 11.7 - 15.7 g/dL    Hematocrit 43.7 35.0 - 47.0 %    MCV 90 78 - 100 fL    MCH 30.1 26.5 - 33.0 pg    MCHC 33.6 31.5 - 36.5 g/dL    RDW 12.2 10.0 - 15.0 %    Platelet Count 201 150 - 450 10e3/uL   INR   Result Value Ref Range    INR 1.08 0.85 - 1.15   Hep B Virus DNA Quant Real Time PCR   Result  Value Ref Range    Hepatitis B DNA IU/mL >170,000,000 (A) Not Detected IU/mL    Hepatitis B log >8.2     Narrative    The ABEBE AmpliPrep/ABEBE TaqMan HBV test is a FDA-approved in vitro nucleic acid amplification test for the quantitation of HBV DNA in human plasma (EDTA plasma) or serum using the ABEBE AmpliPrep instrument for automated viral nucleic acid extraction and the ABEBE TaqMan for the automated real-time PCR amplification and detection of viral nucleic acid target. Titer results are reported in International Units/mL (IU/mL) using the 1st WHO International standard for HBV for nucleic acid amplification assays.   Hepatitis delta antibody   Result Value Ref Range    Hepatitis Delta Antibody Negative Negative      Comment:      No antibody to Hepatitis Delta agent was detected. Order   anti-HDV testing only when Hepatitis B virus infection has   been confirmed.  INTERPRETIVE INFORMATION: Hepatitis Delta Ab    This test was developed and its performance characteristics   determined by [facility]. It has not been cleared or   approved by the US Food and Drug Administration. This test   was performed in a CLIA certified laboratory and is   intended for clinical purposes.  Performed by [facility],  [address] [place] [id number] [phone]  [email or web address] Andrea Conrad MD, PHD, Lab. Director       If you have any questions or concerns, please call the clinic at the number listed above.       Sincerely,      Ca Harris MD

## 2023-02-28 LAB
HBV DNA SERPL NAA+PROBE-ACNC: ABNORMAL IU/ML
HBV DNA SERPL NAA+PROBE-LOG IU: >8.2 {LOG_IU}/ML

## 2023-03-01 LAB — HDV AB SER QL IA: NEGATIVE

## 2023-03-13 ENCOUNTER — ANCILLARY PROCEDURE (OUTPATIENT)
Dept: ULTRASOUND IMAGING | Facility: CLINIC | Age: 43
End: 2023-03-13
Attending: STUDENT IN AN ORGANIZED HEALTH CARE EDUCATION/TRAINING PROGRAM
Payer: COMMERCIAL

## 2023-03-13 DIAGNOSIS — B18.1 CHRONIC VIRAL HEPATITIS B WITHOUT DELTA AGENT AND WITHOUT COMA (H): ICD-10-CM

## 2023-03-13 PROCEDURE — 76705 ECHO EXAM OF ABDOMEN: CPT

## 2023-03-30 ENCOUNTER — APPOINTMENT (OUTPATIENT)
Dept: INTERPRETER SERVICES | Facility: CLINIC | Age: 43
End: 2023-03-30
Payer: COMMERCIAL

## 2023-05-18 DIAGNOSIS — E11.9 TYPE 2 DIABETES MELLITUS WITHOUT COMPLICATION, WITHOUT LONG-TERM CURRENT USE OF INSULIN (H): ICD-10-CM

## 2023-05-18 NOTE — TELEPHONE ENCOUNTER
Called pharmacy, per pharmacy patient is requesting a new kit as hers broke at home.     Pending meter kit, routing to refill pool      Oumou Crump, EDITAN, RN  [facility]

## 2023-05-18 NOTE — TELEPHONE ENCOUNTER
Prescription approved per [facility] Refill Protocol.  Jenny Blankenship, RN  [facility] Triage Nurse

## 2023-05-31 DIAGNOSIS — E11.9 TYPE 2 DIABETES MELLITUS WITHOUT COMPLICATION, WITHOUT LONG-TERM CURRENT USE OF INSULIN (H): ICD-10-CM

## 2023-05-31 RX ORDER — LIRAGLUTIDE 6 MG/ML
1.2 INJECTION SUBCUTANEOUS DAILY
Qty: 18 ML | Refills: 0 | Status: SHIPPED | OUTPATIENT
Start: 2023-05-31 | End: 2023-07-20

## 2023-07-20 ENCOUNTER — OFFICE VISIT (OUTPATIENT)
Dept: FAMILY MEDICINE | Facility: CLINIC | Age: 43
End: 2023-07-20
Payer: COMMERCIAL

## 2023-07-20 VITALS
HEIGHT: 58 IN | HEART RATE: 64 BPM | DIASTOLIC BLOOD PRESSURE: 84 MMHG | WEIGHT: 131.8 LBS | SYSTOLIC BLOOD PRESSURE: 136 MMHG | RESPIRATION RATE: 20 BRPM | TEMPERATURE: 97.7 F | OXYGEN SATURATION: 100 % | BODY MASS INDEX: 27.66 KG/M2

## 2023-07-20 DIAGNOSIS — R21 RASH: ICD-10-CM

## 2023-07-20 DIAGNOSIS — E11.9 TYPE 2 DIABETES MELLITUS WITHOUT COMPLICATION, WITHOUT LONG-TERM CURRENT USE OF INSULIN (H): Primary | ICD-10-CM

## 2023-07-20 LAB — HBA1C MFR BLD: 6.5 % (ref 0–5.6)

## 2023-07-20 PROCEDURE — 99213 OFFICE O/P EST LOW 20 MIN: CPT | Performed by: NURSE PRACTITIONER

## 2023-07-20 PROCEDURE — 80048 BASIC METABOLIC PNL TOTAL CA: CPT | Performed by: NURSE PRACTITIONER

## 2023-07-20 PROCEDURE — 36415 COLL VENOUS BLD VENIPUNCTURE: CPT | Performed by: NURSE PRACTITIONER

## 2023-07-20 PROCEDURE — 83036 HEMOGLOBIN GLYCOSYLATED A1C: CPT | Performed by: NURSE PRACTITIONER

## 2023-07-20 RX ORDER — CLOTRIMAZOLE 1 %
CREAM (GRAM) TOPICAL 2 TIMES DAILY
Qty: 30 G | Refills: 1 | Status: SHIPPED | OUTPATIENT
Start: 2023-07-20 | End: 2023-07-27

## 2023-07-20 RX ORDER — LIRAGLUTIDE 6 MG/ML
1.2 INJECTION SUBCUTANEOUS DAILY
Qty: 6 ML | Refills: 5 | Status: SHIPPED | OUTPATIENT
Start: 2023-07-20 | End: 2024-02-15

## 2023-07-20 ASSESSMENT — PAIN SCALES - GENERAL: PAINLEVEL: NO PAIN (0)

## 2023-07-20 NOTE — PROGRESS NOTES
"  Assessment & Plan     Type 2 diabetes mellitus without complication, without long-term current use of insulin (H)  Doing well with current medication. Refilled. Follow up 6 months.   - Hemoglobin A1c; Future  - Basic metabolic panel  (Ca, Cl, CO2, Creat, Gluc, K, Na, BUN); Future  - liraglutide (VICTOZA) 18 MG/3ML solution; Inject 1.2 mg Subcutaneous daily  - insulin pen needle (31G X 6 MM) 31G X 6 MM miscellaneous; Use 1 pen needles daily or as directed.  - Hemoglobin A1c  - Basic metabolic panel  (Ca, Cl, CO2, Creat, Gluc, K, Na, BUN)    Rash  - clotrimazole (LOTRIMIN) 1 % external cream; Apply topically 2 times daily for 7 days Apply to foot             BMI:   Estimated body mass index is 27.55 kg/m  as calculated from the following:    Height as of this encounter: 1.473 m (4' 10\").    Weight as of this encounter: 59.8 kg (131 lb 12.8 oz).   Weight management plan: Discussed healthy diet and exercise guidelines    See Patient Instructions    The benefits, risks and potential side effects were discussed in detail. Black box warnings discussed as relevant. All patient questions were answered. The patient was instructed to follow up immediately if any adverse reactions develop.    Return precautions discussed, including when to seek urgent/emergent care.    Patient verbalizes understanding and agrees with plan of care. Patient stable for discharge.      ARVIND LING CNP  [facility]    Noah Mendoza is a 43 year old, presenting for the following health issues:  Recheck Medication and Diabetes        7/20/2023     1:42 PM   Additional Questions   Roomed by bernard         7/20/2023     1:42 PM   Patient Reported Additional Medications   Patient reports taking the following new medications none     History of Present Illness       Diabetes:   She presents for follow up of diabetes.  She is checking home blood glucose one time daily. She checks blood glucose before meals.  Blood " "glucose is never over 200 and never under 70. When her blood glucose is low, the patient is asymptomatic for confusion, blurred vision, lethargy and reports not feeling dizzy, shaky, or weak.  She has no concerns regarding her diabetes at this time.  She is not experiencing numbness or burning in feet, excessive thirst, blurry vision, weight changes or redness, sores or blisters on feet.         She eats 2-3 servings of fruits and vegetables daily.She consumes 0 sweetened beverage(s) daily.She exercises with enough effort to increase her heart rate 30 to 60 minutes per day.  She exercises with enough effort to increase her heart rate 5 days per week.   She is taking medications regularly.     103-116  No neck swelling, trouble swallowing or hoarseness  No personal or family hx of medullary thyroid carcinoma or multiple endocrine neoplasia syndrome type 2  No hx pancreatitis        Review of Systems   Constitutional, HEENT, cardiovascular, pulmonary, GI, , musculoskeletal, neuro, skin, endocrine and psych systems are negative, except as otherwise noted.      Objective    /84 (BP Location: Left arm, Patient Position: Sitting, Cuff Size: Adult Regular)   Pulse 64   Temp 97.7  F (36.5  C) (Tympanic)   Resp 20   Ht 1.473 m (4' 10\")   Wt 59.8 kg (131 lb 12.8 oz)   LMP 06/28/2023 (Approximate)   SpO2 100%   BMI 27.55 kg/m    Body mass index is 27.55 kg/m .  Physical Exam   GENERAL: healthy, alert and no distress  NECK: no adenopathy, no asymmetry, masses, or scars and thyroid normal to palpation  RESP: lungs clear to auscultation - no rales, rhonchi or wheezes  CV: regular rate and rhythm, normal S1 S2, no S3 or S4, no murmur, click or rub, no peripheral edema and peripheral pulses strong  ABDOMEN: soft, nontender, no hepatosplenomegaly, no masses and bowel sounds normal  MS: no gross musculoskeletal defects noted, no edema  SKIN: annular rash with erythematous border and central scaling to foot  PSYCH: " mentation appears normal, affect normal/bright    Results for orders placed or performed in visit on 07/20/23   Hemoglobin A1c     Status: Abnormal   Result Value Ref Range    Hemoglobin A1C 6.5 (H) 0.0 - 5.6 %

## 2023-07-20 NOTE — LETTER
July 21, 2023      Reji Lockwood  [address] [place] [address]      Dear Ms. Mendoza,   Your lab results were normal. Your a1ci s down to 6.5! Great work! Please follow up in clinic in 6 months. Please let us know if you have any questions.   Thank you for allowing us to participate in your care.   Eloisa Bruno, ARVIND CNP     Resulted Orders   Hemoglobin A1c   Result Value Ref Range    Hemoglobin A1C 6.5 (H) 0.0 - 5.6 %      Comment:      Normal <5.7%   Prediabetes 5.7-6.4%    Diabetes 6.5% or higher     Note: Adopted from ADA consensus guidelines.   Basic metabolic panel  (Ca, Cl, CO2, Creat, Gluc, K, Na, BUN)   Result Value Ref Range    Sodium 136 136 - 145 mmol/L    Potassium 4.1 3.4 - 5.3 mmol/L    Chloride 102 98 - 107 mmol/L    Carbon Dioxide (CO2) 25 22 - 29 mmol/L    Anion Gap 9 7 - 15 mmol/L    Urea Nitrogen 11.7 6.0 - 20.0 mg/dL    Creatinine 0.66 0.51 - 0.95 mg/dL    Calcium 9.3 8.6 - 10.0 mg/dL    Glucose 100 (H) 70 - 99 mg/dL    GFR Estimate >90 >60 mL/min/1.73m2

## 2023-07-20 NOTE — PATIENT INSTRUCTIONS
At [facility], we strive to deliver an exceptional experience to you, every time we see you. If you receive a survey, please complete it as we do value your feedback.  If you have MyChart, you can expect to receive results automatically within 24 hours of their completion.  Your provider will send a note interpreting your results as well.   If you do not have MyChart, you should receive your results in about a week by mail.    Your care team:                            Family Medicine Internal Medicine   MD Don Olivia MD Shantel Branch-Fleming, MD Srinivasa Vaka, MD Katya Belousova, PAARVIND Roblero CNP, MD ([facility]   Deon Arambula, MD Huong Flores MD Amelia Massimini APRN LTAONYA Dixon APRN MD Lula Batres MD          Clinic hours: Monday - Thursday 7 am-6 pm; Fridays 7 am-5 pm.   Urgent care: Monday - Friday 10 am- 8 pm; Saturday and Sunday 9 am-5 pm.    Clinic: [phone]       [facility]: Monday - Thursday 8 am - 7 pm; Friday 8 am - 6 pm  [facility]: [phone]

## 2023-07-21 LAB
ANION GAP SERPL CALCULATED.3IONS-SCNC: 9 MMOL/L (ref 7–15)
BUN SERPL-MCNC: 11.7 MG/DL (ref 6–20)
CALCIUM SERPL-MCNC: 9.3 MG/DL (ref 8.6–10)
CHLORIDE SERPL-SCNC: 102 MMOL/L (ref 98–107)
CREAT SERPL-MCNC: 0.66 MG/DL (ref 0.51–0.95)
DEPRECATED HCO3 PLAS-SCNC: 25 MMOL/L (ref 22–29)
GFR SERPL CREATININE-BSD FRML MDRD: >90 ML/MIN/1.73M2
GLUCOSE SERPL-MCNC: 100 MG/DL (ref 70–99)
POTASSIUM SERPL-SCNC: 4.1 MMOL/L (ref 3.4–5.3)
SODIUM SERPL-SCNC: 136 MMOL/L (ref 136–145)

## 2023-09-18 ENCOUNTER — VIRTUAL VISIT (OUTPATIENT)
Dept: INTERPRETER SERVICES | Facility: CLINIC | Age: 43
End: 2023-09-18
Payer: COMMERCIAL

## 2023-09-18 ENCOUNTER — OFFICE VISIT (OUTPATIENT)
Dept: GASTROENTEROLOGY | Facility: CLINIC | Age: 43
End: 2023-09-18
Attending: STUDENT IN AN ORGANIZED HEALTH CARE EDUCATION/TRAINING PROGRAM
Payer: COMMERCIAL

## 2023-09-18 VITALS
DIASTOLIC BLOOD PRESSURE: 78 MMHG | HEIGHT: 58 IN | WEIGHT: 131.7 LBS | SYSTOLIC BLOOD PRESSURE: 121 MMHG | BODY MASS INDEX: 27.65 KG/M2 | OXYGEN SATURATION: 100 % | HEART RATE: 65 BPM

## 2023-09-18 DIAGNOSIS — B18.1 CHRONIC VIRAL HEPATITIS B WITHOUT DELTA AGENT AND WITHOUT COMA (H): Primary | ICD-10-CM

## 2023-09-18 DIAGNOSIS — R79.89 ELEVATED LFTS: ICD-10-CM

## 2023-09-18 DIAGNOSIS — B18.1 CHRONIC VIRAL HEPATITIS B WITHOUT DELTA AGENT AND WITHOUT COMA (H): ICD-10-CM

## 2023-09-18 LAB
ALBUMIN SERPL BCG-MCNC: 4.5 G/DL (ref 3.5–5.2)
ALP SERPL-CCNC: 79 U/L (ref 35–104)
ALT SERPL W P-5'-P-CCNC: 18 U/L (ref 0–50)
ANION GAP SERPL CALCULATED.3IONS-SCNC: 11 MMOL/L (ref 7–15)
AST SERPL W P-5'-P-CCNC: 21 U/L (ref 0–45)
BILIRUB DIRECT SERPL-MCNC: 0.23 MG/DL (ref 0–0.3)
BILIRUB SERPL-MCNC: 0.9 MG/DL
BUN SERPL-MCNC: 17.1 MG/DL (ref 6–20)
CALCIUM SERPL-MCNC: 9.7 MG/DL (ref 8.6–10)
CHLORIDE SERPL-SCNC: 101 MMOL/L (ref 98–107)
CREAT SERPL-MCNC: 0.67 MG/DL (ref 0.51–0.95)
DEPRECATED HCO3 PLAS-SCNC: 24 MMOL/L (ref 22–29)
EGFRCR SERPLBLD CKD-EPI 2021: >90 ML/MIN/1.73M2
ERYTHROCYTE [DISTWIDTH] IN BLOOD BY AUTOMATED COUNT: 11.5 % (ref 10–15)
GLUCOSE SERPL-MCNC: 116 MG/DL (ref 70–99)
HCT VFR BLD AUTO: 43.1 % (ref 35–47)
HGB BLD-MCNC: 14.8 G/DL (ref 11.7–15.7)
INR PPP: 1.04 (ref 0.85–1.15)
MCH RBC QN AUTO: 30 PG (ref 26.5–33)
MCHC RBC AUTO-ENTMCNC: 34.3 G/DL (ref 31.5–36.5)
MCV RBC AUTO: 87 FL (ref 78–100)
PLATELET # BLD AUTO: 231 10E3/UL (ref 150–450)
POTASSIUM SERPL-SCNC: 3.9 MMOL/L (ref 3.4–5.3)
PROT SERPL-MCNC: 7.9 G/DL (ref 6.4–8.3)
RBC # BLD AUTO: 4.93 10E6/UL (ref 3.8–5.2)
SODIUM SERPL-SCNC: 136 MMOL/L (ref 136–145)
WBC # BLD AUTO: 7.2 10E3/UL (ref 4–11)

## 2023-09-18 PROCEDURE — 87517 HEPATITIS B DNA QUANT: CPT | Performed by: STUDENT IN AN ORGANIZED HEALTH CARE EDUCATION/TRAINING PROGRAM

## 2023-09-18 PROCEDURE — 36415 COLL VENOUS BLD VENIPUNCTURE: CPT

## 2023-09-18 PROCEDURE — 85027 COMPLETE CBC AUTOMATED: CPT

## 2023-09-18 PROCEDURE — 85610 PROTHROMBIN TIME: CPT

## 2023-09-18 PROCEDURE — 80053 COMPREHEN METABOLIC PANEL: CPT

## 2023-09-18 PROCEDURE — 99214 OFFICE O/P EST MOD 30 MIN: CPT | Performed by: STUDENT IN AN ORGANIZED HEALTH CARE EDUCATION/TRAINING PROGRAM

## 2023-09-18 PROCEDURE — 82248 BILIRUBIN DIRECT: CPT

## 2023-09-18 RX ORDER — TENOFOVIR DISOPROXIL FUMARATE 300 MG/1
300 TABLET, FILM COATED ORAL DAILY
Qty: 90 TABLET | Refills: 3 | Status: SHIPPED | OUTPATIENT
Start: 2023-09-18 | End: 2024-04-08

## 2023-09-18 ASSESSMENT — PAIN SCALES - GENERAL: PAINLEVEL: NO PAIN (0)

## 2023-09-18 NOTE — NURSING NOTE
"Chief Complaint   Patient presents with    Follow Up     7 month follow up for Hepatitis B.     She requests these members of her care team be copied on today's visit information:  PCP: Eloisa Bruno    Vitals:    09/18/23 1220   BP: 121/78   BP Location: Left arm   Patient Position: Sitting   Cuff Size: Adult Regular   Pulse: 65   SpO2: 100%   Weight: 59.7 kg (131 lb 11.2 oz)   Height: 1.48 m (4' 10.25\")     Body mass index is 27.29 kg/m .    Medications were reconciled.    Does patient need any medication refills at today's visit? None        Jennifer Arredondo CMA    "

## 2023-09-18 NOTE — LETTER
September 19, 2023      Reji Lockwood  [address] [place] [address]        Dear ,    We are writing to inform you of your test results.    Your labs are improved and your virus level is now very low on your hepatitis B medication.     Resulted Orders   Hep B Virus DNA Quant Real Time PCR   Result Value Ref Range    Hepatitis B DNA IU/mL, Instrument 57 (H) <1 IU/mL    Hepatitis B log 1.8     Narrative    The theodora  HBV assay is an FDA-approved in vitro nucleic acid amplification test for the quantification of hepatitis B virus (HBV) DNA in human serum or plasma, using the Roche theodora  Abakus0 instrument for automated viral nucleic acid extraction (silica-based capture technique), purification, amplification, and detection of the viral nucleic acid target. This assay targets the highly conserved pre-Core/Core region of the HBV genome and generates amplification products that are detected real-time by a sequence specific TaqMan probe during amplification. The test is intended for use as an aid in the management of HBV infected patients undergoing anti-viral therapy. Titer results are reported in International Units/mL (IU/mL).    Hepatic panel (Albumin, ALT, AST, Bili, Alk Phos, TP)   Result Value Ref Range    Protein Total 7.9 6.4 - 8.3 g/dL    Albumin 4.5 3.5 - 5.2 g/dL    Bilirubin Total 0.9 <=1.2 mg/dL    Alkaline Phosphatase 79 35 - 104 U/L    AST 21 0 - 45 U/L      Comment:      Reference intervals for this test were updated on 6/12/2023 to more accurately reflect our healthy population. There may be differences in the flagging of prior results with similar values performed with this method. Interpretation of those prior results can be made in the context of the updated reference intervals.    ALT 18 0 - 50 U/L      Comment:      Reference intervals for this test were updated on 6/12/2023 to more accurately reflect our healthy population. There may be differences in the flagging of prior results with  similar values performed with this method. Interpretation of those prior results can be made in the context of the updated reference intervals.      Bilirubin Direct 0.23 0.00 - 0.30 mg/dL   CBC with platelets   Result Value Ref Range    WBC Count 7.2 4.0 - 11.0 10e3/uL    RBC Count 4.93 3.80 - 5.20 10e6/uL    Hemoglobin 14.8 11.7 - 15.7 g/dL    Hematocrit 43.1 35.0 - 47.0 %    MCV 87 78 - 100 fL    MCH 30.0 26.5 - 33.0 pg    MCHC 34.3 31.5 - 36.5 g/dL    RDW 11.5 10.0 - 15.0 %    Platelet Count 231 150 - 450 10e3/uL   INR   Result Value Ref Range    INR 1.04 0.85 - 1.15   Basic metabolic panel  (Ca, Cl, CO2, Creat, Gluc, K, Na, BUN)   Result Value Ref Range    Sodium 136 136 - 145 mmol/L    Potassium 3.9 3.4 - 5.3 mmol/L    Chloride 101 98 - 107 mmol/L    Carbon Dioxide (CO2) 24 22 - 29 mmol/L    Anion Gap 11 7 - 15 mmol/L    Urea Nitrogen 17.1 6.0 - 20.0 mg/dL    Creatinine 0.67 0.51 - 0.95 mg/dL    Calcium 9.7 8.6 - 10.0 mg/dL    Glucose 116 (H) 70 - 99 mg/dL    GFR Estimate >90 >60 mL/min/1.73m2       If you have any questions or concerns, please call the clinic at the number listed above.       Sincerely,      Ca Harris MD

## 2023-09-18 NOTE — LETTER
9/18/2023         RE: Reji Lockwood  [address] [place] [address]        Dear Colleague,    Thank you for referring your patient, Reji Lockwood, to the [facility]. Please see a copy of my visit note below.    [facility] New Patient Visit    Date of Visit: February 27, 2023    Reason for referral: hepatitis B    Subjective: Ms. Lockwood is a 42 year old woman with a history of chronic hepatitis B, who presents for follow up of her hepatitis B.     Was first found to have hepatitis B in 2017 when she was treated during pregnancy. Was seen in our hepatology clinic. Took tenofovir, stopped after she ran out after pregnancy. Last seen in this clinic in 2018.      11/2022  Hepatitis B dna > 170,000,000, has been this high for years  11/2022 , AST 63, previously normal    She has two children, one received ppx for hepatitis B after birth. Lives with partner and two kids.     No known history of liver decompensation or issues related to her hepatitis B.     Very rare alcohol use with family functions.     No family history of hepatitis b, liver disease or liver cancer.     On GLP-1 agonist for DM    Interval Events:  - was started on tenofovir after her last visit, doing well with this. No issues taking it. She does not have any concerns  - RUQ US was normal, no cirrhosis or mass  - family has been tested for hepatitis B and are apparently negative    ROS: 14 point ROS negative except for positives noted in HPI.    PMHx:  Past Medical History:   Diagnosis Date     Chronic viral hepatitis B without delta agent and without coma (H) 2010     History of transfusion of packed red blood cells 2019     Type 2 diabetes mellitus (H) 3/10/2018       PSHx:  Past Surgical History:   Procedure Laterality Date     CERCLAGE CERVICAL N/A 9/11/2017    Procedure: CERCLAGE CERVICAL;  Cervical Cerclage ;  Surgeon: Ez Erazo MD;  Location:  L+D     HC TREATMENT MISSED   SURG, 1ST TRIMESTER  2019       FamHx:  Family History   Problem Relation Age of Onset     Kidney Disease Mother      Unknown/Adopted Father      No family history of liver disease, liver cancer    SocHx:  Social History     Socioeconomic History     Marital status:      Spouse name: gerardo Pozo     Number of children: 2     Years of education: Not on file     Highest education level: Not on file   Occupational History     Occupation:    Tobacco Use     Smoking status: Never     Passive exposure: Never     Smokeless tobacco: Never   Vaping Use     Vaping Use: Never used   Substance and Sexual Activity     Alcohol use: No     Alcohol/week: 0.0 standard drinks of alcohol     Drug use: No     Sexual activity: Yes     Partners: Male     Birth control/protection: None   Other Topics Concern     Parent/sibling w/ CABG, MI or angioplasty before 65F 55M? Not Asked   Social History Narrative    From [place] to [place] in [place]      Social Determinants of Health     Financial Resource Strain: Not on file   Food Insecurity: Not on file   Transportation Needs: Not on file   Physical Activity: Not on file   Stress: Not on file   Social Connections: Not on file   Intimate Partner Violence: Not on file   Housing Stability: Not on file       Medications:  Current Outpatient Medications   Medication     alcohol swab prep pads     blood glucose (NO BRAND SPECIFIED) test strip     blood glucose calibration (NO BRAND SPECIFIED) solution     blood glucose monitoring (NO BRAND SPECIFIED) meter device kit     insulin pen needle (31G X 6 MM) 31G X 6 MM miscellaneous     liraglutide (VICTOZA) 18 MG/3ML solution     tenofovir (VIREAD) 300 MG tablet     tenofovir (VIREAD) 300 MG tablet     thin (NO BRAND SPECIFIED) lancets     No current facility-administered medications for this visit.     No OTCs, herbals    Allergies:  Allergies   Allergen Reactions     Blood Transfusion Related (Informational  "Only) Other (See Comments)     Patient has a history of a clinically significant antibody against RBC antigens.  A delay in compatible RBCs may occur.     Fish Allergy Hives       Objective:  /78 (BP Location: Left arm, Patient Position: Sitting, Cuff Size: Adult Regular)   Pulse 65   Ht 1.48 m (4' 10.25\")   Wt 59.7 kg (131 lb 11.2 oz)   LMP 09/11/2023 (Approximate)   SpO2 100%   BMI 27.29 kg/m     /78 (BP Location: Left arm, Patient Position: Sitting, Cuff Size: Adult Regular)   Pulse 65   Ht 1.48 m (4' 10.25\")   Wt 59.7 kg (131 lb 11.2 oz)   LMP 09/11/2023 (Approximate)   SpO2 100%   BMI 27.29 kg/m    Constitutional: pleasant woman in NAD  Eyes: non icteric  Respiratory: Normal respiratory excursion   MSK: normal range of motion of visualized extremities  Abd: Non distended  Skin: No jaundice  Psychiatric: normal mood and orientation    Labs:  Last Comprehensive Metabolic Panel:  Sodium   Date Value Ref Range Status   07/20/2023 136 136 - 145 mmol/L Final   03/08/2018 139 133 - 144 mmol/L Final     Potassium   Date Value Ref Range Status   07/20/2023 4.1 3.4 - 5.3 mmol/L Final   02/27/2023 3.7 3.4 - 5.3 mmol/L Final   03/08/2018 4.0 3.4 - 5.3 mmol/L Final     Chloride   Date Value Ref Range Status   07/20/2023 102 98 - 107 mmol/L Final   02/27/2023 106 94 - 109 mmol/L Final   03/08/2018 103 94 - 109 mmol/L Final     Carbon Dioxide   Date Value Ref Range Status   03/08/2018 30 20 - 32 mmol/L Final     Carbon Dioxide (CO2)   Date Value Ref Range Status   07/20/2023 25 22 - 29 mmol/L Final   02/27/2023 26 20 - 32 mmol/L Final     Anion Gap   Date Value Ref Range Status   07/20/2023 9 7 - 15 mmol/L Final   02/27/2023 4 3 - 14 mmol/L Final   03/08/2018 6 3 - 14 mmol/L Final     Glucose   Date Value Ref Range Status   07/20/2023 100 (H) 70 - 99 mg/dL Final   02/27/2023 119 (H) 70 - 99 mg/dL Final   01/21/2019 124 (H) 70 - 99 mg/dL Final     Urea Nitrogen   Date Value Ref Range Status   07/20/2023 " 11.7 6.0 - 20.0 mg/dL Final   02/27/2023 9 7 - 30 mg/dL Final   03/08/2018 17 7 - 30 mg/dL Final     Creatinine   Date Value Ref Range Status   07/20/2023 0.66 0.51 - 0.95 mg/dL Final   03/08/2018 0.68 0.52 - 1.04 mg/dL Final     GFR Estimate   Date Value Ref Range Status   07/20/2023 >90 >60 mL/min/1.73m2 Final   03/08/2018 >90 >60 mL/min/1.7m2 Final     Comment:     Non  GFR Calc     Calcium   Date Value Ref Range Status   07/20/2023 9.3 8.6 - 10.0 mg/dL Final   03/08/2018 9.1 8.5 - 10.1 mg/dL Final     Bilirubin Total   Date Value Ref Range Status   02/27/2023 0.5 0.2 - 1.3 mg/dL Final   05/03/2018 0.5 0.2 - 1.3 mg/dL Final     Alkaline Phosphatase   Date Value Ref Range Status   02/27/2023 86 40 - 150 U/L Final   05/03/2018 74 40 - 150 U/L Final     ALT   Date Value Ref Range Status   02/27/2023 151 (H) 0 - 50 U/L Final   05/03/2018 29 0 - 50 U/L Final     AST   Date Value Ref Range Status   02/27/2023 74 (H) 0 - 45 U/L Final   05/03/2018 18 0 - 45 U/L Final       Lab Results   Component Value Date    WBC 6.1 03/08/2018     Lab Results   Component Value Date    RBC 5.09 03/08/2018     Lab Results   Component Value Date    HGB 14.5 03/08/2018     Lab Results   Component Value Date    HCT 44.8 03/08/2018     Lab Results   Component Value Date    MCV 88 03/08/2018     Lab Results   Component Value Date    MCH 28.5 03/08/2018     Lab Results   Component Value Date    MCHC 32.4 03/08/2018     Lab Results   Component Value Date    RDW 12.2 03/08/2018     Lab Results   Component Value Date     03/08/2018       INR   Date Value Ref Range Status   02/27/2023 1.08 0.85 - 1.15 Final   03/08/2018 0.95 0.86 - 1.14 Final            Imaging:    RUQ US 10/2017    Liver: There is mild nodularity to the liver capsule. No discrete  mass. It measures 12.7 cm in the sagittal dimension.      Extrahepatic portal vein flow is antegrade, measuring 25 cm/sec.  Left portal vein flow is antegrade, measuring 29  cm/sec.  Right portal vein flow is antegrade, measuring 25 cm/sec.     Flow in the hepatic artery is towards the liver and:  60 cm/sec peak systolic  0.7 resistive index.      The splenic vein is patent and flow is towards the liver.  The left,  middle, and right hepatic veins are patent with flow towards the IVC.  The IVC is patent with flow towards the heart.   The aorta is not  dilated.     Gallbladder: The gallbladder is well distended and of normal  morphology. No stones are detected. Mild prominence of the gallbladder  wall, likely related to the presence of ascites and underlying liver  disease.     Bile Ducts: Both the intra- and extrahepatic biliary system are of  normal caliber.  The common bile duct measures 2 mm in diameter.     Pancreas: Visualized portions of the head and body of the pancreas are  unremarkable.      Kidneys: Both kidneys are of normal echotexture, without mass nor  hydronephrosis.   The craniocaudal dimensions are: right- 10.4 cm,  left- 11 cm.     Spleen: The spleen measures 9.7 cm in sagittal dimension.     Fluid: No evidence of ascites or pleural effusions.                                                                      Impression:   1.  Patent Doppler assessment.  2.  Mild nodularity to the liver capsule without discrete mass.    RUQ US 3/2023  FINDINGS:    The liver measures a craniocaudal dimension of 10.4 cm.  No focal liver lesion. Liver echogenicity is normal. The gallbladder  is normal. There is no gallbladder wall thickening or pericholecystic  fluid. No sonographic Lipscomb's sign was elicited.  The diameter of the  common bile duct measures 5mm. The pancreas is partially obscured but  otherwise appears unremarkable. The aorta and IVC are visualized. The  right kidney measures 10.3cm. No solid renal mass, stone or  hydronephrosis.                                                                      IMPRESSION:    Normal right upper quadrant abdominal  ultrasound.    Independently reviewed labs and imaging.     Assessment/Plan: Ms. Lockwood is a 43 year old woman with a history of chronic hepatitis B, who presents for follow up of her hepatitis B.     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  More recently her liver enzymes became elevated, while she may have some contribution from nonalcoholic fatty liver disease, given this elected to start her on treatment with tenofovir 2/2023.  Discussed treatment of hepatitis B is generally lifelong, stopping treatment early can be associate with flares.  Of note her ultrasound from 2017 showed a mildly nodular appearing liver, no other signs of portal hypertension. Repeat US was normal. She is doing well with tenofovir.     Interpretor was used today.     -Tenofovir 300 mg daily  -Repeat liver enzymes and viral load today.    -We will screen for liver cancer every 6 months with ultrasound and AFP starting at age 50  -Recommend family numbers be tested for hepatitis B, recommend discussion of hepatitis B with her children's pediatricians - they are negative er the patient      Orders Placed This Encounter   Procedures     Hep B Virus DNA Quant Real Time PCR     Hepatic panel (Albumin, ALT, AST, Bili, Alk Phos, TP)     Basic metabolic panel  (Ca, Cl, CO2, Creat, Gluc, K, Na, BUN)       RTC 6 months with labs and RUQ US     Ca Harris MD MS  Hepatology/Liver Transplant  [facility]      Again, thank you for allowing me to participate in the care of your patient.        Sincerely,        Ca Harris MD

## 2023-09-18 NOTE — PROGRESS NOTES
[facility] New Patient Visit    Date of Visit: 2023    Reason for referral: hepatitis B    Subjective: Ms. Lockwood is a 42 year old woman with a history of chronic hepatitis B, who presents for follow up of her hepatitis B.     Was first found to have hepatitis B in 2017 when she was treated during pregnancy. Was seen in our hepatology clinic. Took tenofovir, stopped after she ran out after pregnancy. Last seen in this clinic in 2018.      2022  Hepatitis B dna > 170,000,000, has been this high for years  2022 , AST 63, previously normal    She has two children, one received ppx for hepatitis B after birth. Lives with partner and two kids.     No known history of liver decompensation or issues related to her hepatitis B.     Very rare alcohol use with family functions.     No family history of hepatitis b, liver disease or liver cancer.     On GLP-1 agonist for DM    Interval Events:  - was started on tenofovir after her last visit, doing well with this. No issues taking it. She does not have any concerns  - RUQ US was normal, no cirrhosis or mass  - family has been tested for hepatitis B and are apparently negative    ROS: 14 point ROS negative except for positives noted in HPI.    PMHx:  Past Medical History:   Diagnosis Date    Chronic viral hepatitis B without delta agent and without coma (H)     History of transfusion of packed red blood cells     Type 2 diabetes mellitus (H) 3/10/2018       PSHx:  Past Surgical History:   Procedure Laterality Date    CERCLAGE CERVICAL N/A 2017    Procedure: CERCLAGE CERVICAL;  Cervical Cerclage ;  Surgeon: Ez Erazo MD;  Location:  L+D    HC TREATMENT MISSED  SURG, 1ST TRIMESTER         FamHx:  Family History   Problem Relation Age of Onset    Kidney Disease Mother     Unknown/Adopted Father      No family history of liver disease, liver cancer    SocHx:  Social History     Socioeconomic History     Marital status:      Spouse name: gerardo Pozo    Number of children: 2    Years of education: Not on file    Highest education level: Not on file   Occupational History    Occupation:    Tobacco Use    Smoking status: Never     Passive exposure: Never    Smokeless tobacco: Never   Vaping Use    Vaping Use: Never used   Substance and Sexual Activity    Alcohol use: No     Alcohol/week: 0.0 standard drinks of alcohol    Drug use: No    Sexual activity: Yes     Partners: Male     Birth control/protection: None   Other Topics Concern    Parent/sibling w/ CABG, MI or angioplasty before 65F 55M? Not Asked   Social History Narrative    From [place] to [facility] to [place] in [place].     [place].      Social Determinants of Health     Financial Resource Strain: Not on file   Food Insecurity: Not on file   Transportation Needs: Not on file   Physical Activity: Not on file   Stress: Not on file   Social Connections: Not on file   Intimate Partner Violence: Not on file   Housing Stability: Not on file       Medications:  Current Outpatient Medications   Medication    alcohol swab prep pads    blood glucose (NO BRAND SPECIFIED) test strip    blood glucose calibration (NO BRAND SPECIFIED) solution    blood glucose monitoring (NO BRAND SPECIFIED) meter device kit    insulin pen needle (31G X 6 MM) 31G X 6 MM miscellaneous    liraglutide (VICTOZA) 18 MG/3ML solution    tenofovir (VIREAD) 300 MG tablet    tenofovir (VIREAD) 300 MG tablet    thin (NO BRAND SPECIFIED) lancets     No current facility-administered medications for this visit.     No OTCs, herbals    Allergies:  Allergies   Allergen Reactions    Blood Transfusion Related (Informational Only) Other (See Comments)     Patient has a history of a clinically significant antibody against RBC antigens.  A delay in compatible RBCs may occur.    Fish Allergy Hives       Objective:  /78 (BP Location: Left arm, Patient Position: Sitting, Cuff Size: Adult Regular)  "  Pulse 65   Ht 1.48 m (4' 10.25\")   Wt 59.7 kg (131 lb 11.2 oz)   LMP 09/11/2023 (Approximate)   SpO2 100%   BMI 27.29 kg/m     /78 (BP Location: Left arm, Patient Position: Sitting, Cuff Size: Adult Regular)   Pulse 65   Ht 1.48 m (4' 10.25\")   Wt 59.7 kg (131 lb 11.2 oz)   LMP 09/11/2023 (Approximate)   SpO2 100%   BMI 27.29 kg/m    Constitutional: pleasant woman in NAD  Eyes: non icteric  Respiratory: Normal respiratory excursion   MSK: normal range of motion of visualized extremities  Abd: Non distended  Skin: No jaundice  Psychiatric: normal mood and orientation    Labs:  Last Comprehensive Metabolic Panel:  Sodium   Date Value Ref Range Status   07/20/2023 136 136 - 145 mmol/L Final   03/08/2018 139 133 - 144 mmol/L Final     Potassium   Date Value Ref Range Status   07/20/2023 4.1 3.4 - 5.3 mmol/L Final   02/27/2023 3.7 3.4 - 5.3 mmol/L Final   03/08/2018 4.0 3.4 - 5.3 mmol/L Final     Chloride   Date Value Ref Range Status   07/20/2023 102 98 - 107 mmol/L Final   02/27/2023 106 94 - 109 mmol/L Final   03/08/2018 103 94 - 109 mmol/L Final     Carbon Dioxide   Date Value Ref Range Status   03/08/2018 30 20 - 32 mmol/L Final     Carbon Dioxide (CO2)   Date Value Ref Range Status   07/20/2023 25 22 - 29 mmol/L Final   02/27/2023 26 20 - 32 mmol/L Final     Anion Gap   Date Value Ref Range Status   07/20/2023 9 7 - 15 mmol/L Final   02/27/2023 4 3 - 14 mmol/L Final   03/08/2018 6 3 - 14 mmol/L Final     Glucose   Date Value Ref Range Status   07/20/2023 100 (H) 70 - 99 mg/dL Final   02/27/2023 119 (H) 70 - 99 mg/dL Final   01/21/2019 124 (H) 70 - 99 mg/dL Final     Urea Nitrogen   Date Value Ref Range Status   07/20/2023 11.7 6.0 - 20.0 mg/dL Final   02/27/2023 9 7 - 30 mg/dL Final   03/08/2018 17 7 - 30 mg/dL Final     Creatinine   Date Value Ref Range Status   07/20/2023 0.66 0.51 - 0.95 mg/dL Final   03/08/2018 0.68 0.52 - 1.04 mg/dL Final     GFR Estimate   Date Value Ref Range Status "   07/20/2023 >90 >60 mL/min/1.73m2 Final   03/08/2018 >90 >60 mL/min/1.7m2 Final     Comment:     Non  GFR Calc     Calcium   Date Value Ref Range Status   07/20/2023 9.3 8.6 - 10.0 mg/dL Final   03/08/2018 9.1 8.5 - 10.1 mg/dL Final     Bilirubin Total   Date Value Ref Range Status   02/27/2023 0.5 0.2 - 1.3 mg/dL Final   05/03/2018 0.5 0.2 - 1.3 mg/dL Final     Alkaline Phosphatase   Date Value Ref Range Status   02/27/2023 86 40 - 150 U/L Final   05/03/2018 74 40 - 150 U/L Final     ALT   Date Value Ref Range Status   02/27/2023 151 (H) 0 - 50 U/L Final   05/03/2018 29 0 - 50 U/L Final     AST   Date Value Ref Range Status   02/27/2023 74 (H) 0 - 45 U/L Final   05/03/2018 18 0 - 45 U/L Final       Lab Results   Component Value Date    WBC 6.1 03/08/2018     Lab Results   Component Value Date    RBC 5.09 03/08/2018     Lab Results   Component Value Date    HGB 14.5 03/08/2018     Lab Results   Component Value Date    HCT 44.8 03/08/2018     Lab Results   Component Value Date    MCV 88 03/08/2018     Lab Results   Component Value Date    MCH 28.5 03/08/2018     Lab Results   Component Value Date    MCHC 32.4 03/08/2018     Lab Results   Component Value Date    RDW 12.2 03/08/2018     Lab Results   Component Value Date     03/08/2018       INR   Date Value Ref Range Status   02/27/2023 1.08 0.85 - 1.15 Final   03/08/2018 0.95 0.86 - 1.14 Final            Imaging:    RUQ US 10/2017    Liver: There is mild nodularity to the liver capsule. No discrete  mass. It measures 12.7 cm in the sagittal dimension.      Extrahepatic portal vein flow is antegrade, measuring 25 cm/sec.  Left portal vein flow is antegrade, measuring 29 cm/sec.  Right portal vein flow is antegrade, measuring 25 cm/sec.     Flow in the hepatic artery is towards the liver and:  60 cm/sec peak systolic  0.7 resistive index.      The splenic vein is patent and flow is towards the liver.  The left,  middle, and right hepatic veins  are patent with flow towards the IVC.  The IVC is patent with flow towards the heart.   The aorta is not  dilated.     Gallbladder: The gallbladder is well distended and of normal  morphology. No stones are detected. Mild prominence of the gallbladder  wall, likely related to the presence of ascites and underlying liver  disease.     Bile Ducts: Both the intra- and extrahepatic biliary system are of  normal caliber.  The common bile duct measures 2 mm in diameter.     Pancreas: Visualized portions of the head and body of the pancreas are  unremarkable.      Kidneys: Both kidneys are of normal echotexture, without mass nor  hydronephrosis.   The craniocaudal dimensions are: right- 10.4 cm,  left- 11 cm.     Spleen: The spleen measures 9.7 cm in sagittal dimension.     Fluid: No evidence of ascites or pleural effusions.                                                                      Impression:   1.  Patent Doppler assessment.  2.  Mild nodularity to the liver capsule without discrete mass.    RUQ US 3/2023  FINDINGS:    The liver measures a craniocaudal dimension of 10.4 cm.  No focal liver lesion. Liver echogenicity is normal. The gallbladder  is normal. There is no gallbladder wall thickening or pericholecystic  fluid. No sonographic Lipscomb's sign was elicited.  The diameter of the  common bile duct measures 5mm. The pancreas is partially obscured but  otherwise appears unremarkable. The aorta and IVC are visualized. The  right kidney measures 10.3cm. No solid renal mass, stone or  hydronephrosis.                                                                      IMPRESSION:    Normal right upper quadrant abdominal ultrasound.    Independently reviewed labs and imaging.     Assessment/Plan: Ms. Lockwood is a 43 year old woman with a history of chronic hepatitis B, who presents for follow up of her hepatitis B.     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  More recently her liver enzymes became elevated, while she may have some contribution from nonalcoholic fatty liver disease, given this elected to start her on treatment with tenofovir 2/2023.  Discussed treatment of hepatitis B is generally lifelong, stopping treatment early can be associate with flares.  Of note her ultrasound from 2017 showed a mildly nodular appearing liver, no other signs of portal hypertension. Repeat US was normal. She is doing well with tenofovir.     Interpretor was used today.     -Tenofovir 300 mg daily  -Repeat liver enzymes and viral load today.    -We will screen for liver cancer every 6 months with ultrasound and AFP starting at age 50  -Recommend family numbers be tested for hepatitis B, recommend discussion of hepatitis B with her children's pediatricians - they are negative er the patient      Orders Placed This Encounter   Procedures    Hep B Virus DNA Quant Real Time PCR    Hepatic panel (Albumin, ALT, AST, Bili, Alk Phos, TP)    Basic metabolic panel  (Ca, Cl, CO2, Creat, Gluc, K, Na, BUN)       RTC 6 months with labs and RUQ US     Ca Harris MD MS  Hepatology/Liver Transplant  [facility]

## 2023-09-19 LAB
HBV DNA SERPL NAA+PROBE-ACNC: 57 IU/ML
HBV DNA SERPL NAA+PROBE-LOG IU: 1.8 {LOG_IU}/ML

## 2023-10-16 ENCOUNTER — PATIENT OUTREACH (OUTPATIENT)
Dept: CARE COORDINATION | Facility: CLINIC | Age: 43
End: 2023-10-16
Payer: COMMERCIAL

## 2023-10-30 ENCOUNTER — PATIENT OUTREACH (OUTPATIENT)
Dept: CARE COORDINATION | Facility: CLINIC | Age: 43
End: 2023-10-30
Payer: COMMERCIAL

## 2023-11-30 DIAGNOSIS — E11.9 TYPE 2 DIABETES MELLITUS WITHOUT COMPLICATION, WITHOUT LONG-TERM CURRENT USE OF INSULIN (H): ICD-10-CM

## 2023-12-14 DIAGNOSIS — E11.9 TYPE 2 DIABETES MELLITUS WITHOUT COMPLICATION, WITHOUT LONG-TERM CURRENT USE OF INSULIN (H): ICD-10-CM

## 2023-12-14 RX ORDER — GLUCOSAMINE HCL/CHONDROITIN SU 500-400 MG
CAPSULE ORAL
Qty: 100 EACH | Refills: 0 | Status: SHIPPED | OUTPATIENT
Start: 2023-12-14 | End: 2024-04-01

## 2024-02-15 DIAGNOSIS — E11.9 TYPE 2 DIABETES MELLITUS WITHOUT COMPLICATION, WITHOUT LONG-TERM CURRENT USE OF INSULIN (H): ICD-10-CM

## 2024-02-15 RX ORDER — LIRAGLUTIDE 6 MG/ML
1.2 INJECTION SUBCUTANEOUS DAILY
Qty: 6 ML | Refills: 0 | Status: SHIPPED | OUTPATIENT
Start: 2024-02-15 | End: 2024-04-01

## 2024-02-19 DIAGNOSIS — E11.9 TYPE 2 DIABETES MELLITUS WITHOUT COMPLICATION, WITHOUT LONG-TERM CURRENT USE OF INSULIN (H): ICD-10-CM

## 2024-02-22 RX ORDER — BLOOD SUGAR DIAGNOSTIC
STRIP MISCELLANEOUS
Qty: 100 STRIP | Refills: 0 | Status: SHIPPED | OUTPATIENT
Start: 2024-02-22 | End: 2024-04-01

## 2024-04-01 ENCOUNTER — OFFICE VISIT (OUTPATIENT)
Dept: FAMILY MEDICINE | Facility: CLINIC | Age: 44
End: 2024-04-01
Payer: COMMERCIAL

## 2024-04-01 VITALS
HEART RATE: 80 BPM | TEMPERATURE: 96.8 F | OXYGEN SATURATION: 99 % | SYSTOLIC BLOOD PRESSURE: 132 MMHG | HEIGHT: 58 IN | WEIGHT: 136 LBS | RESPIRATION RATE: 18 BRPM | DIASTOLIC BLOOD PRESSURE: 84 MMHG | BODY MASS INDEX: 28.55 KG/M2

## 2024-04-01 DIAGNOSIS — E11.9 TYPE 2 DIABETES MELLITUS WITHOUT COMPLICATION, WITHOUT LONG-TERM CURRENT USE OF INSULIN (H): ICD-10-CM

## 2024-04-01 DIAGNOSIS — Z28.21 REFUSED INFLUENZA VACCINE: ICD-10-CM

## 2024-04-01 DIAGNOSIS — Z13.6 CARDIOVASCULAR SCREENING; LDL GOAL LESS THAN 160: ICD-10-CM

## 2024-04-01 DIAGNOSIS — Z11.59 NEED FOR HEPATITIS C SCREENING TEST: ICD-10-CM

## 2024-04-01 DIAGNOSIS — Z12.31 VISIT FOR SCREENING MAMMOGRAM: ICD-10-CM

## 2024-04-01 DIAGNOSIS — R03.0 ELEVATED BLOOD PRESSURE READING WITHOUT DIAGNOSIS OF HYPERTENSION: ICD-10-CM

## 2024-04-01 DIAGNOSIS — B18.1 CHRONIC VIRAL HEPATITIS B WITHOUT DELTA AGENT AND WITHOUT COMA (H): ICD-10-CM

## 2024-04-01 DIAGNOSIS — Z00.00 ROUTINE HISTORY AND PHYSICAL EXAMINATION OF ADULT: Primary | ICD-10-CM

## 2024-04-01 LAB — HBA1C MFR BLD: 6.9 % (ref 0–5.6)

## 2024-04-01 PROCEDURE — 80053 COMPREHEN METABOLIC PANEL: CPT | Performed by: NURSE PRACTITIONER

## 2024-04-01 PROCEDURE — 90480 ADMN SARSCOV2 VAC 1/ONLY CMP: CPT | Performed by: NURSE PRACTITIONER

## 2024-04-01 PROCEDURE — 83036 HEMOGLOBIN GLYCOSYLATED A1C: CPT | Performed by: NURSE PRACTITIONER

## 2024-04-01 PROCEDURE — 82570 ASSAY OF URINE CREATININE: CPT | Performed by: NURSE PRACTITIONER

## 2024-04-01 PROCEDURE — 86803 HEPATITIS C AB TEST: CPT | Performed by: NURSE PRACTITIONER

## 2024-04-01 PROCEDURE — 80061 LIPID PANEL: CPT | Performed by: NURSE PRACTITIONER

## 2024-04-01 PROCEDURE — 99214 OFFICE O/P EST MOD 30 MIN: CPT | Mod: 25 | Performed by: NURSE PRACTITIONER

## 2024-04-01 PROCEDURE — 99207 PR FOOT EXAM NO CHARGE: CPT | Performed by: NURSE PRACTITIONER

## 2024-04-01 PROCEDURE — 82043 UR ALBUMIN QUANTITATIVE: CPT | Performed by: NURSE PRACTITIONER

## 2024-04-01 PROCEDURE — 99396 PREV VISIT EST AGE 40-64: CPT | Mod: 25 | Performed by: NURSE PRACTITIONER

## 2024-04-01 PROCEDURE — 91320 SARSCV2 VAC 30MCG TRS-SUC IM: CPT | Performed by: NURSE PRACTITIONER

## 2024-04-01 PROCEDURE — 36415 COLL VENOUS BLD VENIPUNCTURE: CPT | Performed by: NURSE PRACTITIONER

## 2024-04-01 RX ORDER — BLOOD SUGAR DIAGNOSTIC
STRIP MISCELLANEOUS
Qty: 100 STRIP | Refills: 3 | Status: SHIPPED | OUTPATIENT
Start: 2024-04-01

## 2024-04-01 RX ORDER — GLUCOSAMINE HCL/CHONDROITIN SU 500-400 MG
CAPSULE ORAL
Qty: 100 EACH | Refills: 3 | Status: SHIPPED | OUTPATIENT
Start: 2024-04-01

## 2024-04-01 RX ORDER — LIRAGLUTIDE 6 MG/ML
1.2 INJECTION SUBCUTANEOUS DAILY
Qty: 6 ML | Refills: 2 | Status: SHIPPED | OUTPATIENT
Start: 2024-04-01

## 2024-04-01 ASSESSMENT — PAIN SCALES - GENERAL: PAINLEVEL: NO PAIN (0)

## 2024-04-01 NOTE — PATIENT INSTRUCTIONS
At [facility], we strive to deliver an exceptional experience to you, every time we see you. If you receive a survey, please complete it as we do value your feedback.  If you have MyChart, you can expect to receive results automatically within 24 hours of their completion.  Your provider will send a note interpreting your results as well.   If you do not have MyChart, you should receive your results in about a week by mail.    Your care team:                            Family Medicine Internal Medicine   MD Don Olivia, MD Fadia Waters, MD Katie Suresh, PA-C    Tl Akers, MD Pediatrics   Deon Arambula, PA-C  Ana Aj, MD Tuyet Turk APRARVIND Muñoz CNP, CNP, MD Lula Gomes, MD Tamera Sarmiento, CNP  Same-Day (No follow up visit)   Paolo Gomez, LATONYA Vance, PA-C    Grazyna Grace PA-C     Clinic hours: Monday - Thursday 7 am-6 pm; Fridays 7 am-5 pm.   Urgent care: Monday - Friday 10 am- 8 pm; Saturday and Sunday 9 am-5 pm.    Clinic: [phone]       [facility]: Monday - Thursday 8 am - 7 pm; Friday 8 am - 6 pm  [facility]: [phone]

## 2024-04-01 NOTE — LETTER
April 2, 2024      Reji Lockwood  [address] [place] [address]      Hi Reji,     Your A1c is 6.9 and our goal is for it to be less than 7. Please continue to work on portion control,consuming a heart healthy calorie controlled, low carb diet, and get regular exercise.  We'll keep you medications the same for now and repeat the A1c again in 3 months.     Your urine protein is normal for you as is your cholesterol panel.      The Hepatitis C screen was also negative/normal.  Feel free to contact me with any questions or concerns.  Thank you for allowing me to participate in your care.         Shasta Dixon APRN, CNP     Resulted Orders   Comprehensive metabolic panel (BMP + Alb, Alk Phos, ALT, AST, Total. Bili, TP)   Result Value Ref Range    Sodium 138 135 - 145 mmol/L      Comment:      Reference intervals for this test were updated on 09/26/2023 to more accurately reflect our healthy population. There may be differences in the flagging of prior results with similar values performed with this method. Interpretation of those prior results can be made in the context of the updated reference intervals.     Potassium 3.7 3.4 - 5.3 mmol/L    Carbon Dioxide (CO2) 22 22 - 29 mmol/L    Anion Gap 13 7 - 15 mmol/L    Urea Nitrogen 11.8 6.0 - 20.0 mg/dL    Creatinine 0.63 0.51 - 0.95 mg/dL    GFR Estimate >90 >60 mL/min/1.73m2    Calcium 8.9 8.6 - 10.0 mg/dL    Chloride 103 98 - 107 mmol/L    Glucose 119 (H) 70 - 99 mg/dL    Alkaline Phosphatase 75 40 - 150 U/L      Comment:      Reference intervals for this test were updated on 11/14/2023 to more accurately reflect our healthy population. There may be differences in the flagging of prior results with similar values performed with this method. Interpretation of those prior results can be made in the context of the updated reference intervals.    AST 20 0 - 45 U/L      Comment:      Reference intervals for this test were updated on 6/12/2023 to more accurately  reflect our healthy population. There may be differences in the flagging of prior results with similar values performed with this method. Interpretation of those prior results can be made in the context of the updated reference intervals.    ALT 24 0 - 50 U/L      Comment:      Reference intervals for this test were updated on 6/12/2023 to more accurately reflect our healthy population. There may be differences in the flagging of prior results with similar values performed with this method. Interpretation of those prior results can be made in the context of the updated reference intervals.      Protein Total 7.2 6.4 - 8.3 g/dL    Albumin 4.2 3.5 - 5.2 g/dL    Bilirubin Total 0.6 <=1.2 mg/dL   Hemoglobin A1c   Result Value Ref Range    Hemoglobin A1C 6.9 (H) 0.0 - 5.6 %      Comment:      Normal <5.7%   Prediabetes 5.7-6.4%    Diabetes 6.5% or higher     Note: Adopted from ADA consensus guidelines.   Albumin Random Urine Quantitative with Creat Ratio   Result Value Ref Range    Creatinine Urine mg/dL 174.0 mg/dL      Comment:      The reference ranges have not been established in urine creatinine. The results should be integrated into the clinical context for interpretation.    Albumin Urine mg/L 13.2 mg/L      Comment:      The reference ranges have not been established in urine albumin. The results should be integrated into the clinical context for interpretation.    Albumin Urine mg/g Cr 7.59 0.00 - 25.00 mg/g Cr      Comment:      Microalbuminuria is defined as an albumin:creatinine ratio of 17 to 299 for males and 25 to 299 for females. A ratio of albumin:creatinine of 300 or higher is indicative of overt proteinuria.  Due to biologic variability, positive results should be confirmed by a second, first-morning random or 24-hour timed urine specimen. If there is discrepancy, a third specimen is recommended. When 2 out of 3 results are in the microalbuminuria range, this is evidence for incipient nephropathy and  warrants increased efforts at glucose control, blood pressure control, and institution of therapy with an angiotensin-converting-enzyme (ACE) inhibitor (if the patient can tolerate it).     Lipid panel reflex to direct LDL Fasting   Result Value Ref Range    Cholesterol 180 <200 mg/dL    Triglycerides 139 <150 mg/dL    Direct Measure HDL 62 >=50 mg/dL    LDL Cholesterol Calculated 90 <=100 mg/dL    Non HDL Cholesterol 118 <130 mg/dL    Patient Fasting > 8hrs? No     Narrative    Cholesterol  Desirable:  <200 mg/dL    Triglycerides  Normal:  Less than 150 mg/dL  Borderline High:  150-199 mg/dL  High:  200-499 mg/dL  Very High:  Greater than or equal to 500 mg/dL    Direct Measure HDL  Female:  Greater than or equal to 50 mg/dL   Male:  Greater than or equal to 40 mg/dL    LDL Cholesterol  Desirable:  <100mg/dL  Above Desirable:  100-129 mg/dL   Borderline High:  130-159 mg/dL   High:  160-189 mg/dL   Very High:  >= 190 mg/dL    Non HDL Cholesterol  Desirable:  130 mg/dL  Above Desirable:  130-159 mg/dL  Borderline High:  160-189 mg/dL  High:  190-219 mg/dL  Very High:  Greater than or equal to 220 mg/dL   Hepatitis C Screen Reflex to HCV RNA Quant and Genotype   Result Value Ref Range    Hepatitis C Antibody Nonreactive Nonreactive      Comment:      A nonreactive screening test result does not exclude the possibility of exposure to or infection with HCV. Nonreactive screening test results in individuals with prior exposure to HCV may be due to antibody levels below the limit of detection of this assay or lack of reactivity to the HCV antigens used in this assay. Patients with recent HCV infections (<3 months from time of exposure) may have false-negative HCV antibody results due to the time needed for seroconversion (average of 8 to 9 weeks).

## 2024-04-01 NOTE — PROGRESS NOTES
Assessment & Plan     Routine history and physical examination of adult  Age appropriate preventive screenings,health maintenance issues discussed.      Type 2 diabetes mellitus without complication, without long-term current use of insulin (H)  A1c 6.9 so controlled but advised increased exercise- she has stopped exercising due to stress and busy work schedule. Also work on portion control, heart healthy diabetic diet.  She is not interested in referral to CDE.  - Comprehensive metabolic panel (BMP + Alb, Alk Phos, ALT, AST, Total. Bili, TP)  - Hemoglobin A1c  - Albumin Random Urine Quantitative with Creat Ratio  - FOOT EXAM  - Comprehensive metabolic panel (BMP + Alb, Alk Phos, ALT, AST, Total. Bili, TP)  - Hemoglobin A1c  - Albumin Random Urine Quantitative with Creat Ratio  - Adult Eye  Referral  - liraglutide (VICTOZA) 18 MG/3ML solution  Dispense: 6 mL; Refill: 2  - insulin pen needle (31G X 6 MM) 31G X 6 MM miscellaneous  Dispense: 100 each; Refill: 4  - blood glucose (ACCU-CHEK GUIDE) test strip  Dispense: 100 strip; Refill: 3  - alcohol swab prep pads  Dispense: 100 each; Refill: 3    Elevated blood pressure reading without diagnosis of hypertension  Controlled.  - Comprehensive metabolic panel (BMP + Alb, Alk Phos, ALT, AST, Total. Bili, TP)  - Comprehensive metabolic panel (BMP + Alb, Alk Phos, ALT, AST, Total. Bili, TP)    Chronic viral hepatitis B without delta agent and without coma (H)  Followed by GI.    CARDIOVASCULAR SCREENING; LDL GOAL LESS THAN 160    - Lipid panel reflex to direct LDL Fasting  - Lipid panel reflex to direct LDL Fasting    Visit for screening mammogram    - *MA Screening Digital Bilateral    Need for hepatitis C screening test    - Hepatitis C Screen Reflex to HCV RNA Quant and Genotype  - Hepatitis C Screen Reflex to HCV RNA Quant and Genotype    Refused influenza vaccine  Will get at  a later date.      Ordering of each unique test  Prescription drug  "management        BMI  Estimated body mass index is 28.42 kg/m  as calculated from the following:    Height as of this encounter: 1.473 m (4' 10\").    Weight as of this encounter: 61.7 kg (136 lb).   Weight management plan: Discussed healthy diet and exercise guidelines      Work on weight loss  Regular exercise  See Patient Instructions    Noah Mendoza is a 43 year old, presenting for the following health issues:  Diabetes        4/1/2024     2:00 PM   Additional Questions   Roomed by timothy   Accompanied by self     History of Present Illness       Diabetes:   She presents for follow up of diabetes.  She is checking home blood glucose one time daily.   She checks blood glucose before meals.  Blood glucose is never over 200 and never under 70.     She has no concerns regarding her diabetes at this time.   She is not experiencing numbness or burning in feet, excessive thirst, blurry vision, weight changes or redness, sores or blisters on feet. The patient has not had a diabetic eye exam in the last 12 months.          She eats 2-3 servings of fruits and vegetables daily.She consumes 1 sweetened beverage(s) daily.She exercises with enough effort to increase her heart rate 30 to 60 minutes per day.  She exercises with enough effort to increase her heart rate 5 days per week. She is missing 1 dose(s) of medications per week.      Chronic Hepatitis B- followed by GI.    She endorses increased stress- her Father is ill and he cannot care for  himself, her son also has issues which she did not elaborate on but which stress her.  Her car also broke down, has financial concerns. PHQ-2=0    How many servings of fruits and vegetables do you eat daily?  2-3  On average, how many sweetened beverages do you drink each day (Examples: soda, juice, sweet tea, etc.  Do NOT count diet or artificially sweetened beverages)?   1  How many days per week do you exercise enough to make your heart beat faster? 3 or less  How many " "minutes a day do you exercise enough to make your heart beat faster? 30 - 60  How many days per week do you miss taking your medication? 0        Review of Systems  Constitutional, HEENT, cardiovascular, pulmonary, gi and gu systems are negative, except as otherwise noted.      Objective    /84   Pulse 80   Temp 96.8  F (36  C) (Tympanic)   Resp 18   Ht 1.473 m (4' 10\")   Wt 61.7 kg (136 lb)   SpO2 99%   BMI 28.42 kg/m    Body mass index is 28.42 kg/m .  Physical Exam   GENERAL: alert and no distress  EYES: Eyes grossly normal to inspection, PERRL and conjunctivae and sclerae normal  HENT: ear canals and TM's normal, nose and mouth without ulcers or lesions  NECK: no adenopathy, no asymmetry, masses, or scars  RESP: lungs clear to auscultation - no rales, rhonchi or wheezes  CV: regular rate and rhythm, normal S1 S2, no S3 or S4, no murmur, click or rub, no peripheral edema  ABDOMEN: soft, nontender, no hepatosplenomegaly, no masses and bowel sounds normal  MS: no gross musculoskeletal defects noted, no edema  SKIN: no suspicious lesions or rashes  NEURO: Normal strength and tone, mentation intact and speech normal  PSYCH: mentation appears normal, affect normal/bright  LYMPH: normal ant/post cervical, supraclavicular nodes  inguinal: no adenopathy  Diabetic foot exam: normal DP and PT pulses, no trophic changes or ulcerative lesions and normal sensory exam   Results for orders placed or performed in visit on 04/01/24 (from the past 24 hour(s))   Hemoglobin A1c   Result Value Ref Range    Hemoglobin A1C 6.9 (H) 0.0 - 5.6 %         Component      Latest Ref Rng 4/1/2024  1:40 PM   Sodium      135 - 145 mmol/L 138    Potassium      3.4 - 5.3 mmol/L 3.7    Carbon Dioxide (CO2)      22 - 29 mmol/L 22    Anion Gap      7 - 15 mmol/L 13    Urea Nitrogen      6.0 - 20.0 mg/dL 11.8    Creatinine      0.51 - 0.95 mg/dL 0.63    GFR Estimate      >60 mL/min/1.73m2 >90    Calcium      8.6 - 10.0 mg/dL 8.9  "   Chloride      98 - 107 mmol/L 103    Glucose      70 - 99 mg/dL 119 (H)    Alkaline Phosphatase      40 - 150 U/L 75    AST      0 - 45 U/L 20    ALT      0 - 50 U/L 24    Protein Total      6.4 - 8.3 g/dL 7.2    Albumin      3.5 - 5.2 g/dL 4.2    Bilirubin Total      <=1.2 mg/dL 0.6    Cholesterol      <200 mg/dL 180    Triglycerides      <150 mg/dL 139    HDL Cholesterol      >=50 mg/dL 62    LDL Cholesterol Calculated      <=100 mg/dL 90    Non HDL Cholesterol      <130 mg/dL 118    Patient Fasting? No    Creatinine Urine      mg/dL 174.0    Albumin Urine mg/L      mg/L 13.2    Albumin Urine mg/g Cr      0.00 - 25.00 mg/g Cr 7.59    Hemoglobin A1C      0.0 - 5.6 % 6.9 (H)    Hepatitis C Antibody      Nonreactive  Nonreactive       Legend:  (H) High    Signed Electronically by: ARVIND Godinez CNP

## 2024-04-02 LAB
ALBUMIN SERPL BCG-MCNC: 4.2 G/DL (ref 3.5–5.2)
ALP SERPL-CCNC: 75 U/L (ref 40–150)
ALT SERPL W P-5'-P-CCNC: 24 U/L (ref 0–50)
ANION GAP SERPL CALCULATED.3IONS-SCNC: 13 MMOL/L (ref 7–15)
AST SERPL W P-5'-P-CCNC: 20 U/L (ref 0–45)
BILIRUB SERPL-MCNC: 0.6 MG/DL
BUN SERPL-MCNC: 11.8 MG/DL (ref 6–20)
CALCIUM SERPL-MCNC: 8.9 MG/DL (ref 8.6–10)
CHLORIDE SERPL-SCNC: 103 MMOL/L (ref 98–107)
CHOLEST SERPL-MCNC: 180 MG/DL
CREAT SERPL-MCNC: 0.63 MG/DL (ref 0.51–0.95)
CREAT UR-MCNC: 174 MG/DL
DEPRECATED HCO3 PLAS-SCNC: 22 MMOL/L (ref 22–29)
EGFRCR SERPLBLD CKD-EPI 2021: >90 ML/MIN/1.73M2
FASTING STATUS PATIENT QL REPORTED: NO
GLUCOSE SERPL-MCNC: 119 MG/DL (ref 70–99)
HCV AB SERPL QL IA: NONREACTIVE
HDLC SERPL-MCNC: 62 MG/DL
LDLC SERPL CALC-MCNC: 90 MG/DL
MICROALBUMIN UR-MCNC: 13.2 MG/L
MICROALBUMIN/CREAT UR: 7.59 MG/G CR (ref 0–25)
NONHDLC SERPL-MCNC: 118 MG/DL
POTASSIUM SERPL-SCNC: 3.7 MMOL/L (ref 3.4–5.3)
PROT SERPL-MCNC: 7.2 G/DL (ref 6.4–8.3)
SODIUM SERPL-SCNC: 138 MMOL/L (ref 135–145)
TRIGL SERPL-MCNC: 139 MG/DL

## 2024-04-02 NOTE — RESULT ENCOUNTER NOTE
Hi Reji,    Your A1c is 6.9 and our goal is for it to be less than 7. Please continue to work on portion control,consuming a heart healthy calorie controlled, low carb diet, and get regular exercise.  We'll keep you medications the same for now and repeat the A1c again in 3 months.    Your urine protein is normal for you as is your cholesterol panel.      The Hepatitis C screen was also negative/normal.  Feel free to contact me with any questions or concerns.  Thank you for allowing me to participate in your care.        Shsata SAMUELS, CNP

## 2024-04-08 ENCOUNTER — TELEPHONE (OUTPATIENT)
Dept: GASTROENTEROLOGY | Facility: CLINIC | Age: 44
End: 2024-04-08
Payer: COMMERCIAL

## 2024-04-08 DIAGNOSIS — B18.1 CHRONIC VIRAL HEPATITIS B WITHOUT DELTA AGENT AND WITHOUT COMA (H): ICD-10-CM

## 2024-04-08 RX ORDER — TENOFOVIR DISOPROXIL FUMARATE 300 MG/1
300 TABLET, FILM COATED ORAL DAILY
Qty: 90 TABLET | Refills: 3 | Status: SHIPPED | OUTPATIENT
Start: 2024-04-08

## 2024-04-08 NOTE — TELEPHONE ENCOUNTER
Health Call Center    Phone Message    May a detailed message be left on voicemail: yes     Reason for Call: Medication Refill Request    Has the patient contacted the pharmacy for the refill? Yes   Name of medication being requested: tenofovir (VIREAD) 300 MG tablet   Provider who prescribed the medication: Dr. Harris  Pharmacy:   [id number], [place] - [address]  & [facility]   Date medication is needed: ASAP       Action Taken: Message routed to:  [facility] ([facility]): Hepatology    Travel Screening: Not Applicable

## 2024-04-15 ENCOUNTER — OFFICE VISIT (OUTPATIENT)
Dept: OPTOMETRY | Facility: CLINIC | Age: 44
End: 2024-04-15
Attending: NURSE PRACTITIONER
Payer: COMMERCIAL

## 2024-04-15 DIAGNOSIS — H52.13 MYOPIA, BILATERAL: ICD-10-CM

## 2024-04-15 DIAGNOSIS — H52.4 PRESBYOPIA: ICD-10-CM

## 2024-04-15 DIAGNOSIS — E11.9 TYPE 2 DIABETES MELLITUS WITHOUT COMPLICATION, WITHOUT LONG-TERM CURRENT USE OF INSULIN (H): Primary | ICD-10-CM

## 2024-04-15 DIAGNOSIS — H52.223 REGULAR ASTIGMATISM OF BOTH EYES: ICD-10-CM

## 2024-04-15 PROCEDURE — 92015 DETERMINE REFRACTIVE STATE: CPT | Performed by: OPTOMETRIST

## 2024-04-15 PROCEDURE — 99214 OFFICE O/P EST MOD 30 MIN: CPT | Performed by: OPTOMETRIST

## 2024-04-15 ASSESSMENT — KERATOMETRY
OS_AXISANGLE_DEGREES: 110
OD_AXISANGLE2_DEGREES: 99
OD_K2POWER_DIOPTERS: 46.25
OS_K1POWER_DIOPTERS: 45.50
OS_K2POWER_DIOPTERS: 45.75
OD_AXISANGLE_DEGREES: 9
OS_AXISANGLE2_DEGREES: 20
OD_K1POWER_DIOPTERS: 45.75

## 2024-04-15 ASSESSMENT — CONF VISUAL FIELD
OD_INFERIOR_TEMPORAL_RESTRICTION: 0
OS_INFERIOR_TEMPORAL_RESTRICTION: 0
OS_SUPERIOR_TEMPORAL_RESTRICTION: 0
OS_NORMAL: 1
OD_SUPERIOR_TEMPORAL_RESTRICTION: 0
OS_SUPERIOR_NASAL_RESTRICTION: 0
OS_INFERIOR_NASAL_RESTRICTION: 0
OD_SUPERIOR_NASAL_RESTRICTION: 0
OD_INFERIOR_NASAL_RESTRICTION: 0
OD_NORMAL: 1

## 2024-04-15 ASSESSMENT — TONOMETRY
OS_IOP_MMHG: 16
OD_IOP_MMHG: 15
IOP_METHOD: APPLANATION

## 2024-04-15 ASSESSMENT — REFRACTION_MANIFEST
OD_SPHERE: -1.50
METHOD_AUTOREFRACTION: 1
OS_SPHERE: -1.75
OS_CYLINDER: +0.75
OS_CYLINDER: +0.75
OS_AXIS: 016
OD_CYLINDER: +1.00
OS_ADD: +1.25
OS_SPHERE: -1.50
OS_AXIS: 015
OD_CYLINDER: +1.25
OD_ADD: +1.25
OD_AXIS: 005
OD_SPHERE: -2.00
OD_AXIS: 176

## 2024-04-15 ASSESSMENT — CUP TO DISC RATIO
OD_RATIO: 0.2
OS_RATIO: 0.2

## 2024-04-15 ASSESSMENT — EXTERNAL EXAM - LEFT EYE: OS_EXAM: NORMAL

## 2024-04-15 ASSESSMENT — VISUAL ACUITY
OD_SC: 20/25
CORRECTION_TYPE: GLASSES
OD_CC: 20/25
OS_CC+: +3
METHOD: SNELLEN - LINEAR
OS_CC: 20/30
OS_SC: 20/20

## 2024-04-15 ASSESSMENT — SLIT LAMP EXAM - LIDS
COMMENTS: NORMAL
COMMENTS: NORMAL

## 2024-04-15 ASSESSMENT — REFRACTION_WEARINGRX
OS_SPHERE: -1.75
OS_AXIS: 015
OS_CYLINDER: +0.75
SPECS_TYPE: SVL
OD_AXIS: 003
OD_SPHERE: -1.50
OD_CYLINDER: +0.75

## 2024-04-15 ASSESSMENT — EXTERNAL EXAM - RIGHT EYE: OD_EXAM: NORMAL

## 2024-04-15 NOTE — LETTER
4/15/2024         RE: Reji Lockwood  [address] [place] [address]        Dear Colleague,    Thank you for referring your patient, Reji Lockwood, to the [facility]. Please see a copy of my visit note below.    Chief Complaint   Patient presents with     Diabetic Eye Exam        Chief Complaint(s) and History of Present Illness(es)       Diabetic Eye Exam                    Lab Results   Component Value Date    A1C 6.9 04/01/2024    A1C 6.5 07/20/2023    A1C 6.9 02/27/2023    A1C 9.7 11/15/2022    A1C 5.7 01/21/2019    A1C 5.6 10/18/2017    A1C 5.7 06/19/2017    A1C 6.0 09/02/2015            Last Eye Exam: 2022  Dilated Previously: Yes    What are you currently using to see?  glasses    Distance Vision Acuity: Satisfied with vision    Near Vision Acuity: Satisfied with vision while reading and using computer with glasses    Eye Comfort: good  Do you use eye drops? 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       OBJECTIVE: See Ophthalmology exam    ASSESSMENT:    ICD-10-CM    1. Type 2 diabetes mellitus without complication, without long-term current use of insulin (H)  E11.9 Adult Eye  Referral          PLAN:    Reji Lockwood aware  eye exam results will be sent to Clinic - [facility].  There are no Patient Instructions on file for this visit.           Again, thank you for allowing me to participate in the care of your patient.        Sincerely,        Janiya Olvera OD

## 2024-04-15 NOTE — PROGRESS NOTES
Chief Complaint   Patient presents with    Diabetic Eye Exam        Chief Complaint(s) and History of Present Illness(es)       Diabetic Eye Exam                    Lab Results   Component Value Date    A1C 6.9 04/01/2024    A1C 6.5 07/20/2023    A1C 6.9 02/27/2023    A1C 9.7 11/15/2022    A1C 5.7 01/21/2019    A1C 5.6 10/18/2017    A1C 5.7 06/19/2017    A1C 6.0 09/02/2015            Last Eye Exam: 2022  Dilated Previously: Yes    What are you currently using to see?  glasses    Distance Vision Acuity: Satisfied with vision    Near Vision Acuity: Satisfied with vision while reading and using computer with glasses    Eye Comfort: good  Do you use eye drops? 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       OBJECTIVE: See Ophthalmology exam    ASSESSMENT:    ICD-10-CM    1. Type 2 diabetes mellitus without complication, without long-term current use of insulin (H)  E11.9 Adult Eye  Referral          PLAN:    Reji Lockwood aware  eye exam results will be sent to Clinic - [facility].  There are no Patient Instructions on file for this visit.

## 2024-04-15 NOTE — PATIENT INSTRUCTIONS
Diabetes weakens the blood vessels all over the body, including the eyes. Damage to the blood vessels in the eyes can cause swelling or bleeding into part of the eye (called the retina). This is called diabetic retinopathy (DAR-tin-AH-pu-thee). If not treated, this disease can cause vision loss or blindness.   Symptoms may include blurred or distorted vision, but many people have no symptoms. It's important to see your eye doctor regularly to check for problems.   Early treatment and good control can help protect your vision. Here are the things you can do to help prevent vision loss:      1. Keep your blood sugar levels under tight control.      2. Bring high blood pressure under control.      3. No smoking.      4. Have yearly dilated eye exams.        Myopia is a result of long eyes. It is commonly referred to as near-sightedness. Seeing clearly in the distance is the main challenge.     Astigmatism results from curvature differential in the cornea and crystalline lens which can cause a distorted image, as light rays are prevented from meeting at a common focus.     Eyeglass prescription given.     The affects of the dilating drops last for 4- 6 hours.  You will be more sensitive to light and vision will be blurry up close.  Do not drive if you do not feel comfortable.  Mydriatic sunglasses were given if needed.     Recommend annual eye exams.     Janiya Olvera O.D.  [address] [id number] [phone]

## 2024-04-29 ENCOUNTER — ANCILLARY PROCEDURE (OUTPATIENT)
Dept: MAMMOGRAPHY | Facility: CLINIC | Age: 44
End: 2024-04-29
Attending: NURSE PRACTITIONER
Payer: COMMERCIAL

## 2024-04-29 DIAGNOSIS — Z12.31 VISIT FOR SCREENING MAMMOGRAM: ICD-10-CM

## 2024-04-29 PROCEDURE — 77067 SCR MAMMO BI INCL CAD: CPT | Performed by: RADIOLOGY

## 2024-04-29 PROCEDURE — 77063 BREAST TOMOSYNTHESIS BI: CPT | Performed by: RADIOLOGY

## 2024-05-02 DIAGNOSIS — E11.9 TYPE 2 DIABETES MELLITUS WITHOUT COMPLICATION, WITHOUT LONG-TERM CURRENT USE OF INSULIN (H): ICD-10-CM

## 2024-05-03 RX ORDER — LIRAGLUTIDE 6 MG/ML
INJECTION SUBCUTANEOUS
Qty: 18 ML | OUTPATIENT
Start: 2024-05-03

## 2024-05-04 DIAGNOSIS — E11.9 TYPE 2 DIABETES MELLITUS WITHOUT COMPLICATION, WITHOUT LONG-TERM CURRENT USE OF INSULIN (H): ICD-10-CM

## 2024-05-06 RX ORDER — LANCETS
EACH MISCELLANEOUS
Qty: 100 EACH | Refills: 6 | Status: SHIPPED | OUTPATIENT
Start: 2024-05-06

## 2024-05-29 ENCOUNTER — TELEPHONE (OUTPATIENT)
Dept: FAMILY MEDICINE | Facility: CLINIC | Age: 44
End: 2024-05-29
Payer: COMMERCIAL

## 2024-05-29 DIAGNOSIS — E11.9 TYPE 2 DIABETES MELLITUS WITHOUT COMPLICATION, WITHOUT LONG-TERM CURRENT USE OF INSULIN (H): Primary | ICD-10-CM

## 2024-05-29 NOTE — TELEPHONE ENCOUNTER
Pharmacy fax:    liraglutide (VICTOZA) 18 MG/3ML solution  is on backorder.     Please fax over alternative.

## 2024-05-29 NOTE — TELEPHONE ENCOUNTER
To Ozempic 1 mg weekly as she has been on 1.8 mg Victoza daily but it is on back order.    Shasta SAMUELS, CNP

## 2024-05-30 ENCOUNTER — TELEPHONE (OUTPATIENT)
Dept: FAMILY MEDICINE | Facility: CLINIC | Age: 44
End: 2024-05-30
Payer: COMMERCIAL

## 2024-05-30 NOTE — TELEPHONE ENCOUNTER
Plan does not cover Semaglutide, 1 MG/DOSE, (OZEMPIC) 4 MG/3ML pen .      Please go to [email or web address] to initiate Prior Auth or switch to alternative medication.    Insurance type and ID number: Key:  [id number]      Additional Information:     Charlotte Caballero

## 2024-06-10 NOTE — TELEPHONE ENCOUNTER
Prior Authorization Approval    Authorization Effective Date: 6/10/2024  Authorization Expiration Date: 6/10/2025  Medication: Semaglutide, 1 MG/DOSE, (OZEMPIC) 4 MG/3ML pen-APPROVED  Reference #:     Insurance Company: [employer] - Phone [phone] Fax [phone]  Which Pharmacy is filling the prescription (Not needed for infusion/clinic administered): [id number] - [facility] [address] AT [address] [place] [address]  Pharmacy Notified: Yes  Patient Notified: Instructed pharmacy to notify patient when script is ready to /ship.

## 2024-06-10 NOTE — TELEPHONE ENCOUNTER
Retail Pharmacy Prior Authorization Team   Phone: [phone]    PA Initiation    Medication: OZEMPIC (1 MG/DOSE) 4 MG/3ML SC SOPN  Insurance Company: [employer] - Phone [phone] Fax [phone]  Pharmacy Filling the Rx: [facility] #[id number] [place] [address] [address] [facility] AT [address] [place]  Filling Pharmacy Phone: [phone]  Filling Pharmacy Fax:    Start Date: 6/10/2024

## 2024-09-04 DIAGNOSIS — B18.1 CHRONIC VIRAL HEPATITIS B WITHOUT DELTA AGENT AND WITHOUT COMA (H): Primary | ICD-10-CM

## 2024-09-23 ENCOUNTER — LAB (OUTPATIENT)
Dept: LAB | Facility: CLINIC | Age: 44
End: 2024-09-23
Payer: COMMERCIAL

## 2024-09-23 ENCOUNTER — OFFICE VISIT (OUTPATIENT)
Dept: GASTROENTEROLOGY | Facility: CLINIC | Age: 44
End: 2024-09-23
Attending: STUDENT IN AN ORGANIZED HEALTH CARE EDUCATION/TRAINING PROGRAM
Payer: COMMERCIAL

## 2024-09-23 VITALS
OXYGEN SATURATION: 99 % | SYSTOLIC BLOOD PRESSURE: 126 MMHG | HEART RATE: 81 BPM | WEIGHT: 125.1 LBS | BODY MASS INDEX: 26.26 KG/M2 | HEIGHT: 58 IN | DIASTOLIC BLOOD PRESSURE: 87 MMHG

## 2024-09-23 DIAGNOSIS — B18.1 CHRONIC VIRAL HEPATITIS B WITHOUT DELTA AGENT AND WITHOUT COMA (H): ICD-10-CM

## 2024-09-23 DIAGNOSIS — B18.1 CHRONIC VIRAL HEPATITIS B WITHOUT DELTA AGENT AND WITHOUT COMA (H): Primary | ICD-10-CM

## 2024-09-23 DIAGNOSIS — R79.89 ELEVATED LFTS: ICD-10-CM

## 2024-09-23 LAB
ALBUMIN SERPL BCG-MCNC: 4.3 G/DL (ref 3.5–5.2)
ALP SERPL-CCNC: 71 U/L (ref 40–150)
ALT SERPL W P-5'-P-CCNC: 8 U/L (ref 0–50)
ANION GAP SERPL CALCULATED.3IONS-SCNC: 13 MMOL/L (ref 7–15)
AST SERPL W P-5'-P-CCNC: 17 U/L (ref 0–45)
BILIRUB DIRECT SERPL-MCNC: <0.2 MG/DL (ref 0–0.3)
BILIRUB SERPL-MCNC: 0.9 MG/DL
BUN SERPL-MCNC: 15.3 MG/DL (ref 6–20)
CALCIUM SERPL-MCNC: 9.3 MG/DL (ref 8.8–10.4)
CHLORIDE SERPL-SCNC: 104 MMOL/L (ref 98–107)
CREAT SERPL-MCNC: 0.69 MG/DL (ref 0.51–0.95)
EGFRCR SERPLBLD CKD-EPI 2021: >90 ML/MIN/1.73M2
ERYTHROCYTE [DISTWIDTH] IN BLOOD BY AUTOMATED COUNT: 11.9 % (ref 10–15)
GLUCOSE SERPL-MCNC: 99 MG/DL (ref 70–99)
HCO3 SERPL-SCNC: 21 MMOL/L (ref 22–29)
HCT VFR BLD AUTO: 43.1 % (ref 35–47)
HGB BLD-MCNC: 14.3 G/DL (ref 11.7–15.7)
INR PPP: 1.03 (ref 0.85–1.15)
MCH RBC QN AUTO: 28.8 PG (ref 26.5–33)
MCHC RBC AUTO-ENTMCNC: 33.2 G/DL (ref 31.5–36.5)
MCV RBC AUTO: 87 FL (ref 78–100)
PLATELET # BLD AUTO: 254 10E3/UL (ref 150–450)
POTASSIUM SERPL-SCNC: 4.3 MMOL/L (ref 3.4–5.3)
PROT SERPL-MCNC: 7.8 G/DL (ref 6.4–8.3)
RBC # BLD AUTO: 4.97 10E6/UL (ref 3.8–5.2)
SODIUM SERPL-SCNC: 138 MMOL/L (ref 135–145)
WBC # BLD AUTO: 8.8 10E3/UL (ref 4–11)

## 2024-09-23 PROCEDURE — 85027 COMPLETE CBC AUTOMATED: CPT

## 2024-09-23 PROCEDURE — 80053 COMPREHEN METABOLIC PANEL: CPT

## 2024-09-23 PROCEDURE — 99214 OFFICE O/P EST MOD 30 MIN: CPT | Performed by: STUDENT IN AN ORGANIZED HEALTH CARE EDUCATION/TRAINING PROGRAM

## 2024-09-23 PROCEDURE — T1013 SIGN LANG/ORAL INTERPRETER: HCPCS | Mod: U4

## 2024-09-23 PROCEDURE — 87517 HEPATITIS B DNA QUANT: CPT

## 2024-09-23 PROCEDURE — 82248 BILIRUBIN DIRECT: CPT

## 2024-09-23 PROCEDURE — 36415 COLL VENOUS BLD VENIPUNCTURE: CPT

## 2024-09-23 PROCEDURE — 85610 PROTHROMBIN TIME: CPT

## 2024-09-23 ASSESSMENT — PAIN SCALES - GENERAL: PAINLEVEL: NO PAIN (0)

## 2024-09-23 NOTE — LETTER
2024      Reji Lockwood  [address] [place] [address]      Dear Colleague,    Thank you for referring your patient, Reji Lockwood, to the [facility]. Please see a copy of my visit note below.    [facility] Return Patient Visit    Date of Visit: 2024    Reason for referral: hepatitis B    Subjective: Ms. Lockwood is a 44 year old woman with a history of chronic hepatitis B, who presents for follow up of her hepatitis B.     Was first found to have hepatitis B in  when she was treated during pregnancy. Was seen in our hepatology clinic. Took tenofovir, stopped after she ran out after pregnancy. Last seen in this clinic in 2018.      2022  Hepatitis B dna > 170,000,000, has been this high for years  2022 , AST 63, previously normal    She has two children, one received ppx for hepatitis B after birth. Lives with partner and two kids.     No known history of liver decompensation or issues related to her hepatitis B.     Very rare alcohol use with family functions.     No family history of hepatitis b, liver disease or liver cancer.     On GLP-1 agonist for DM    Started on tenofovir .    Interval Events:  -She is doing well with her tenofovir. No issues taking it. She does not have any concerns  -Lost about 5 pounds since her last visit    ROS: 14 point ROS negative except for positives noted in HPI.    PMHx:  Past Medical History:   Diagnosis Date     Chronic viral hepatitis B without delta agent and without coma (H)      History of transfusion of packed red blood cells 2019     Type 2 diabetes mellitus (H) 3/10/2018       PSHx:  Past Surgical History:   Procedure Laterality Date     CERCLAGE CERVICAL N/A 2017    Procedure: CERCLAGE CERVICAL;  Cervical Cerclage ;  Surgeon: Ez Erazo MD;  Location: [facility]     HC TREATMENT MISSED  SURG, 1ST TRIMESTER  2019       FamHx:  Family History   Problem Relation Age  of Onset     Kidney Disease Mother      Unknown/Adopted Father      No family history of liver disease, liver cancer    SocHx:  Social History     Socioeconomic History     Marital status:      Spouse name: gerardo Pozo     Number of children: 2     Years of education: Not on file     Highest education level: Not on file   Occupational History     Occupation:    Tobacco Use     Smoking status: Never     Passive exposure: Never     Smokeless tobacco: Never   Vaping Use     Vaping status: Never Used   Substance and Sexual Activity     Alcohol use: No     Alcohol/week: 0.0 standard drinks of alcohol     Drug use: No     Sexual activity: Yes     Partners: Male     Birth control/protection: None   Other Topics Concern     Parent/sibling w/ CABG, MI or angioplasty before 65F 55M? Not Asked   Social History Narrative    From [place] to [place] to [place] in [place].     [place].      Social Determinants of Health     Financial Resource Strain: Not on file   Food Insecurity: Not on file   Transportation Needs: Not on file   Physical Activity: Not on file   Stress: Not on file   Social Connections: Not on file   Interpersonal Safety: Not on file   Housing Stability: Not on file       Medications:  Current Outpatient Medications   Medication Sig Dispense Refill     alcohol swab prep pads Use to swab area of injection/rfeddy as directed. *PLEASE SCHEDULE APPT FOR FURTHER REFILLS* 100 each 3     blood glucose (ACCU-CHEK GUIDE) test strip Use to test blood sugar once daily or as needed.  Visit due for more. 100 strip 3     blood glucose calibration (NO BRAND SPECIFIED) solution To accompany: Blood Glucose Monitor Brands: per insurance. 1 each 11     blood glucose monitoring (NO BRAND SPECIFIED) meter device kit Use to test blood sugar 1 time daily or as directed. Preferred blood glucose meter OR supplies to accompany: Blood Glucose Monitor Brands: per insurance. 1 kit 0     insulin pen needle (31G X 6 MM) 31G X 6 MM  "miscellaneous Use 1 pen needles daily or as directed. 100 each 4     liraglutide (VICTOZA) 18 MG/3ML solution Inject 1.2 mg Subcutaneous daily Visit due for more. 6 mL 2     Semaglutide, 1 MG/DOSE, (OZEMPIC) 4 MG/3ML pen Inject 1 mg Subcutaneous every 7 days 9 mL 1     tenofovir (VIREAD) 300 MG tablet Take 1 tablet (300 mg) by mouth daily 90 tablet 3     thin (NO BRAND SPECIFIED) lancets Use with lanceting device. To accompany: Blood Glucose Monitor Brands: per insurance. 100 each 6     tenofovir (VIREAD) 300 MG tablet Take 1 tablet (300 mg) by mouth daily 90 tablet 3     No current facility-administered medications for this visit.     No OTCs, herbals    Allergies:  Allergies   Allergen Reactions     Blood Transfusion Related (Informational Only) Other (See Comments)     Patient has a history of a clinically significant antibody against RBC antigens.  A delay in compatible RBCs may occur.     Fish Allergy Hives       Objective:  /87 (BP Location: Right arm, Patient Position: Sitting, Cuff Size: Adult Regular)   Pulse 81   Ht 1.473 m (4' 10\")   Wt 56.7 kg (125 lb 1.6 oz)   SpO2 99%   BMI 26.15 kg/m     /87 (BP Location: Right arm, Patient Position: Sitting, Cuff Size: Adult Regular)   Pulse 81   Ht 1.473 m (4' 10\")   Wt 56.7 kg (125 lb 1.6 oz)   SpO2 99%   BMI 26.15 kg/m    Constitutional: pleasant woman in NAD  Eyes: non icteric  Respiratory: Normal respiratory excursion   MSK: normal range of motion of visualized extremities  Abd: Non distended  Skin: No jaundice  Psychiatric: normal mood and orientation    Labs:  Last Comprehensive Metabolic Panel:  Sodium   Date Value Ref Range Status   09/23/2024 138 135 - 145 mmol/L Final   03/08/2018 139 133 - 144 mmol/L Final     Potassium   Date Value Ref Range Status   09/23/2024 4.3 3.4 - 5.3 mmol/L Final   02/27/2023 3.7 3.4 - 5.3 mmol/L Final   03/08/2018 4.0 3.4 - 5.3 mmol/L Final     Chloride   Date Value Ref Range Status   09/23/2024 104 98 - " 107 mmol/L Final   02/27/2023 106 94 - 109 mmol/L Final   03/08/2018 103 94 - 109 mmol/L Final     Carbon Dioxide   Date Value Ref Range Status   03/08/2018 30 20 - 32 mmol/L Final     Carbon Dioxide (CO2)   Date Value Ref Range Status   09/23/2024 21 (L) 22 - 29 mmol/L Final   02/27/2023 26 20 - 32 mmol/L Final     Anion Gap   Date Value Ref Range Status   09/23/2024 13 7 - 15 mmol/L Final   02/27/2023 4 3 - 14 mmol/L Final   03/08/2018 6 3 - 14 mmol/L Final     Glucose   Date Value Ref Range Status   09/23/2024 99 70 - 99 mg/dL Final   02/27/2023 119 (H) 70 - 99 mg/dL Final   01/21/2019 124 (H) 70 - 99 mg/dL Final     Urea Nitrogen   Date Value Ref Range Status   09/23/2024 15.3 6.0 - 20.0 mg/dL Final   02/27/2023 9 7 - 30 mg/dL Final   03/08/2018 17 7 - 30 mg/dL Final     Creatinine   Date Value Ref Range Status   09/23/2024 0.69 0.51 - 0.95 mg/dL Final   03/08/2018 0.68 0.52 - 1.04 mg/dL Final     GFR Estimate   Date Value Ref Range Status   09/23/2024 >90 >60 mL/min/1.73m2 Final     Comment:     eGFR calculated using 2021 CKD-EPI equation.   03/08/2018 >90 >60 mL/min/1.7m2 Final     Comment:     Non  GFR Calc     Calcium   Date Value Ref Range Status   09/23/2024 9.3 8.8 - 10.4 mg/dL Final     Comment:     Reference intervals for this test were updated on 7/16/2024 to reflect our healthy population more accurately. There may be differences in the flagging of prior results with similar values performed with this method. Those prior results can be interpreted in the context of the updated reference intervals.   03/08/2018 9.1 8.5 - 10.1 mg/dL Final     Bilirubin Total   Date Value Ref Range Status   09/23/2024 0.9 <=1.2 mg/dL Final   05/03/2018 0.5 0.2 - 1.3 mg/dL Final     Alkaline Phosphatase   Date Value Ref Range Status   09/23/2024 71 40 - 150 U/L Final   05/03/2018 74 40 - 150 U/L Final     ALT   Date Value Ref Range Status   09/23/2024 8 0 - 50 U/L Final   05/03/2018 29 0 - 50 U/L Final      AST   Date Value Ref Range Status   09/23/2024 17 0 - 45 U/L Final   05/03/2018 18 0 - 45 U/L Final       Lab Results   Component Value Date    WBC 6.1 03/08/2018     Lab Results   Component Value Date    RBC 5.09 03/08/2018     Lab Results   Component Value Date    HGB 14.5 03/08/2018     Lab Results   Component Value Date    HCT 44.8 03/08/2018     Lab Results   Component Value Date    MCV 88 03/08/2018     Lab Results   Component Value Date    MCH 28.5 03/08/2018     Lab Results   Component Value Date    MCHC 32.4 03/08/2018     Lab Results   Component Value Date    RDW 12.2 03/08/2018     Lab Results   Component Value Date     03/08/2018       INR   Date Value Ref Range Status   09/23/2024 1.03 0.85 - 1.15 Final   03/08/2018 0.95 0.86 - 1.14 Final            Imaging:    [facility] US 10/2017    Liver: There is mild nodularity to the liver capsule. No discrete  mass. It measures 12.7 cm in the sagittal dimension.      Extrahepatic portal vein flow is antegrade, measuring 25 cm/sec.  Left portal vein flow is antegrade, measuring 29 cm/sec.  Right portal vein flow is antegrade, measuring 25 cm/sec.     Flow in the hepatic artery is towards the liver and:  60 cm/sec peak systolic  0.7 resistive index.      The splenic vein is patent and flow is towards the liver.  The left,  middle, and right hepatic veins are patent with flow towards the IVC.  The IVC is patent with flow towards the heart.   The aorta is not  dilated.     Gallbladder: The gallbladder is well distended and of normal  morphology. No stones are detected. Mild prominence of the gallbladder  wall, likely related to the presence of ascites and underlying liver  disease.     Bile Ducts: Both the intra- and extrahepatic biliary system are of  normal caliber.  The common bile duct measures 2 mm in diameter.     Pancreas: Visualized portions of the head and body of the pancreas are  unremarkable.      Kidneys: Both kidneys are of normal echotexture,  without mass nor  hydronephrosis.   The craniocaudal dimensions are: right- 10.4 cm,  left- 11 cm.     Spleen: The spleen measures 9.7 cm in sagittal dimension.     Fluid: No evidence of ascites or pleural effusions.                                                                      Impression:   1.  Patent Doppler assessment.  2.  Mild nodularity to the liver capsule without discrete mass.    [facility] US 3/2023  FINDINGS:    The liver measures a craniocaudal dimension of 10.4 cm.  No focal liver lesion. Liver echogenicity is normal. The gallbladder  is normal. There is no gallbladder wall thickening or pericholecystic  fluid. No sonographic Lipscomb's sign was elicited.  The diameter of the  common bile duct measures 5mm. The pancreas is partially obscured but  otherwise appears unremarkable. The aorta and IVC are visualized. The  right kidney measures 10.3cm. No solid renal mass, stone or  hydronephrosis.                                                                      IMPRESSION:    Normal right upper quadrant abdominal ultrasound.    Independently reviewed labs and imaging.     Assessment/Plan: Ms. Lockwood is a 44 year old woman with a history of chronic hepatitis B, who presents for follow up of her hepatitis B.     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  In [id number], her liver enzymes became elevated, while she may have some contribution from nonalcoholic fatty liver disease, given this elected to start her on treatment with tenofovir 2/2023.  She is responded well to treatment and her liver enzymes are now normal.  Discussed treatment of hepatitis B is generally lifelong, stopping treatment early can be associate with flares.  Of note her ultrasound from 2017 showed a mildly nodular appearing liver, no other signs of portal hypertension. Repeat US was normal. She is doing well with tenofovir.     Interpretor was used today.     -Tenofovir 300  mg daily  -MELD labs and hepatitis B labs every 6 months  -Will recheck an ultrasound now, then will plan to screen for liver cancer every 6 months with ultrasound and AFP starting at age 50  -Recommend family numbers be tested for hepatitis B, recommend discussion of hepatitis B with her children's pediatricians - they are negative per the patient      Orders Placed This Encounter   Procedures     US Abdomen Limited     Hepatic function panel     Basic metabolic panel     CBC with platelets     INR     Hep B Virus DNA Quant Real Time PCR       RTC 6 months with labs and RUQ US     Ca Harris MD MS  Hepatology/Liver Transplant  [facility]      Again, thank you for allowing me to participate in the care of your patient.        Sincerely,        Ca Harris MD

## 2024-09-23 NOTE — NURSING NOTE
"Chief Complaint   Patient presents with    Follow Up     Yearly follow up for chronic viral hepatitis B.     Vitals:    09/23/24 1223   BP: 126/87   BP Location: Right arm   Patient Position: Sitting   Cuff Size: Adult Regular   Pulse: 81   SpO2: 99%   Weight: 56.7 kg (125 lb 1.6 oz)   Height: 1.473 m (4' 10\")     Body mass index is 26.15 kg/m .    Medications were reconciled.    Does patient need any medication refills at today's visit? yes        Jennifer Arredondo CMA    "

## 2024-09-23 NOTE — PROGRESS NOTES
[facility] Return Patient Visit    Date of Visit: 2024    Reason for referral: hepatitis B    Subjective: Ms. Lockwood is a 44 year old woman with a history of chronic hepatitis B, who presents for follow up of her hepatitis B.     Was first found to have hepatitis B in  when she was treated during pregnancy. Was seen in our hepatology clinic. Took tenofovir, stopped after she ran out after pregnancy. Last seen in this clinic in 2018.      2022  Hepatitis B dna > 170,000,000, has been this high for years  2022 , AST 63, previously normal    She has two children, one received ppx for hepatitis B after birth. Lives with partner and two kids.     No known history of liver decompensation or issues related to her hepatitis B.     Very rare alcohol use with family functions.     No family history of hepatitis b, liver disease or liver cancer.     On GLP-1 agonist for DM    Started on tenofovir .    Interval Events:  -She is doing well with her tenofovir. No issues taking it. She does not have any concerns  -Lost about 5 pounds since her last visit    ROS: 14 point ROS negative except for positives noted in HPI.    PMHx:  Past Medical History:   Diagnosis Date    Chronic viral hepatitis B without delta agent and without coma (H)     History of transfusion of packed red blood cells     Type 2 diabetes mellitus (H) 3/10/2018       PSHx:  Past Surgical History:   Procedure Laterality Date    CERCLAGE CERVICAL N/A 2017    Procedure: CERCLAGE CERVICAL;  Cervical Cerclage ;  Surgeon: Ez Erazo MD;  Location: [facility]    HC TREATMENT MISSED  SURG, 1ST TRIMESTER         FamHx:  Family History   Problem Relation Age of Onset    Kidney Disease Mother     Unknown/Adopted Father      No family history of liver disease, liver cancer    SocHx:  Social History     Socioeconomic History    Marital status:      Spouse name: [facility]    Number of  children: 2    Years of education: Not on file    Highest education level: Not on file   Occupational History    Occupation:    Tobacco Use    Smoking status: Never     Passive exposure: Never    Smokeless tobacco: Never   Vaping Use    Vaping status: Never Used   Substance and Sexual Activity    Alcohol use: No     Alcohol/week: 0.0 standard drinks of alcohol    Drug use: No    Sexual activity: Yes     Partners: Male     Birth control/protection: None   Other Topics Concern    Parent/sibling w/ CABG, MI or angioplasty before 65F 55M? Not Asked   Social History Narrative    From [place] to [place] to [place] in [place].     [place].      Social Determinants of Health     Financial Resource Strain: Not on file   Food Insecurity: Not on file   Transportation Needs: Not on file   Physical Activity: Not on file   Stress: Not on file   Social Connections: Not on file   Interpersonal Safety: Not on file   Housing Stability: Not on file       Medications:  Current Outpatient Medications   Medication Sig Dispense Refill    alcohol swab prep pads Use to swab area of injection/freddy as directed. *PLEASE SCHEDULE APPT FOR FURTHER REFILLS* 100 each 3    blood glucose (ACCU-CHEK GUIDE) test strip Use to test blood sugar once daily or as needed.  Visit due for more. 100 strip 3    blood glucose calibration (NO BRAND SPECIFIED) solution To accompany: Blood Glucose Monitor Brands: per insurance. 1 each 11    blood glucose monitoring (NO BRAND SPECIFIED) meter device kit Use to test blood sugar 1 time daily or as directed. Preferred blood glucose meter OR supplies to accompany: Blood Glucose Monitor Brands: per insurance. 1 kit 0    insulin pen needle (31G X 6 MM) 31G X 6 MM miscellaneous Use 1 pen needles daily or as directed. 100 each 4    liraglutide (VICTOZA) 18 MG/3ML solution Inject 1.2 mg Subcutaneous daily Visit due for more. 6 mL 2    Semaglutide, 1 MG/DOSE, (OZEMPIC) 4 MG/3ML pen Inject 1 mg Subcutaneous every 7 days  "9 mL 1    tenofovir (VIREAD) 300 MG tablet Take 1 tablet (300 mg) by mouth daily 90 tablet 3    thin (NO BRAND SPECIFIED) lancets Use with lanceting device. To accompany: Blood Glucose Monitor Brands: per insurance. 100 each 6    tenofovir (VIREAD) 300 MG tablet Take 1 tablet (300 mg) by mouth daily 90 tablet 3     No current facility-administered medications for this visit.     No OTCs, herbals    Allergies:  Allergies   Allergen Reactions    Blood Transfusion Related (Informational Only) Other (See Comments)     Patient has a history of a clinically significant antibody against RBC antigens.  A delay in compatible RBCs may occur.    Fish Allergy Hives       Objective:  /87 (BP Location: Right arm, Patient Position: Sitting, Cuff Size: Adult Regular)   Pulse 81   Ht 1.473 m (4' 10\")   Wt 56.7 kg (125 lb 1.6 oz)   SpO2 99%   BMI 26.15 kg/m     /87 (BP Location: Right arm, Patient Position: Sitting, Cuff Size: Adult Regular)   Pulse 81   Ht 1.473 m (4' 10\")   Wt 56.7 kg (125 lb 1.6 oz)   SpO2 99%   BMI 26.15 kg/m    Constitutional: pleasant woman in NAD  Eyes: non icteric  Respiratory: Normal respiratory excursion   MSK: normal range of motion of visualized extremities  Abd: Non distended  Skin: No jaundice  Psychiatric: normal mood and orientation    Labs:  Last Comprehensive Metabolic Panel:  Sodium   Date Value Ref Range Status   09/23/2024 138 135 - 145 mmol/L Final   03/08/2018 139 133 - 144 mmol/L Final     Potassium   Date Value Ref Range Status   09/23/2024 4.3 3.4 - 5.3 mmol/L Final   02/27/2023 3.7 3.4 - 5.3 mmol/L Final   03/08/2018 4.0 3.4 - 5.3 mmol/L Final     Chloride   Date Value Ref Range Status   09/23/2024 104 98 - 107 mmol/L Final   02/27/2023 106 94 - 109 mmol/L Final   03/08/2018 103 94 - 109 mmol/L Final     Carbon Dioxide   Date Value Ref Range Status   03/08/2018 30 20 - 32 mmol/L Final     Carbon Dioxide (CO2)   Date Value Ref Range Status   09/23/2024 21 (L) 22 - 29 " mmol/L Final   02/27/2023 26 20 - 32 mmol/L Final     Anion Gap   Date Value Ref Range Status   09/23/2024 13 7 - 15 mmol/L Final   02/27/2023 4 3 - 14 mmol/L Final   03/08/2018 6 3 - 14 mmol/L Final     Glucose   Date Value Ref Range Status   09/23/2024 99 70 - 99 mg/dL Final   02/27/2023 119 (H) 70 - 99 mg/dL Final   01/21/2019 124 (H) 70 - 99 mg/dL Final     Urea Nitrogen   Date Value Ref Range Status   09/23/2024 15.3 6.0 - 20.0 mg/dL Final   02/27/2023 9 7 - 30 mg/dL Final   03/08/2018 17 7 - 30 mg/dL Final     Creatinine   Date Value Ref Range Status   09/23/2024 0.69 0.51 - 0.95 mg/dL Final   03/08/2018 0.68 0.52 - 1.04 mg/dL Final     GFR Estimate   Date Value Ref Range Status   09/23/2024 >90 >60 mL/min/1.73m2 Final     Comment:     eGFR calculated using 2021 CKD-EPI equation.   03/08/2018 >90 >60 mL/min/1.7m2 Final     Comment:     Non  GFR Calc     Calcium   Date Value Ref Range Status   09/23/2024 9.3 8.8 - 10.4 mg/dL Final     Comment:     Reference intervals for this test were updated on 7/16/2024 to reflect our healthy population more accurately. There may be differences in the flagging of prior results with similar values performed with this method. Those prior results can be interpreted in the context of the updated reference intervals.   03/08/2018 9.1 8.5 - 10.1 mg/dL Final     Bilirubin Total   Date Value Ref Range Status   09/23/2024 0.9 <=1.2 mg/dL Final   05/03/2018 0.5 0.2 - 1.3 mg/dL Final     Alkaline Phosphatase   Date Value Ref Range Status   09/23/2024 71 40 - 150 U/L Final   05/03/2018 74 40 - 150 U/L Final     ALT   Date Value Ref Range Status   09/23/2024 8 0 - 50 U/L Final   05/03/2018 29 0 - 50 U/L Final     AST   Date Value Ref Range Status   09/23/2024 17 0 - 45 U/L Final   05/03/2018 18 0 - 45 U/L Final       Lab Results   Component Value Date    WBC 6.1 03/08/2018     Lab Results   Component Value Date    RBC 5.09 03/08/2018     Lab Results   Component Value Date     HGB 14.5 03/08/2018     Lab Results   Component Value Date    HCT 44.8 03/08/2018     Lab Results   Component Value Date    MCV 88 03/08/2018     Lab Results   Component Value Date    MCH 28.5 03/08/2018     Lab Results   Component Value Date    MCHC 32.4 03/08/2018     Lab Results   Component Value Date    RDW 12.2 03/08/2018     Lab Results   Component Value Date     03/08/2018       INR   Date Value Ref Range Status   09/23/2024 1.03 0.85 - 1.15 Final   03/08/2018 0.95 0.86 - 1.14 Final            Imaging:    RUQ US 10/2017    Liver: There is mild nodularity to the liver capsule. No discrete  mass. It measures 12.7 cm in the sagittal dimension.      Extrahepatic portal vein flow is antegrade, measuring 25 cm/sec.  Left portal vein flow is antegrade, measuring 29 cm/sec.  Right portal vein flow is antegrade, measuring 25 cm/sec.     Flow in the hepatic artery is towards the liver and:  60 cm/sec peak systolic  0.7 resistive index.      The splenic vein is patent and flow is towards the liver.  The left,  middle, and right hepatic veins are patent with flow towards the IVC.  The IVC is patent with flow towards the heart.   The aorta is not  dilated.     Gallbladder: The gallbladder is well distended and of normal  morphology. No stones are detected. Mild prominence of the gallbladder  wall, likely related to the presence of ascites and underlying liver  disease.     Bile Ducts: Both the intra- and extrahepatic biliary system are of  normal caliber.  The common bile duct measures 2 mm in diameter.     Pancreas: Visualized portions of the head and body of the pancreas are  unremarkable.      Kidneys: Both kidneys are of normal echotexture, without mass nor  hydronephrosis.   The craniocaudal dimensions are: right- 10.4 cm,  left- 11 cm.     Spleen: The spleen measures 9.7 cm in sagittal dimension.     Fluid: No evidence of ascites or pleural effusions.                                                                       Impression:   1.  Patent Doppler assessment.  2.  Mild nodularity to the liver capsule without discrete mass.    RUQ US 3/2023  FINDINGS:    The liver measures a craniocaudal dimension of 10.4 cm.  No focal liver lesion. Liver echogenicity is normal. The gallbladder  is normal. There is no gallbladder wall thickening or pericholecystic  fluid. No sonographic Lipscomb's sign was elicited.  The diameter of the  common bile duct measures 5mm. The pancreas is partially obscured but  otherwise appears unremarkable. The aorta and IVC are visualized. The  right kidney measures 10.3cm. No solid renal mass, stone or  hydronephrosis.                                                                      IMPRESSION:    Normal right upper quadrant abdominal ultrasound.    Independently reviewed labs and imaging.     Assessment/Plan: Ms. Lockwood is a 44 year old woman with a history of chronic hepatitis B, who presents for follow up of her hepatitis B.     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  In 6468-5646, her liver enzymes became elevated, while she may have some contribution from nonalcoholic fatty liver disease, given this elected to start her on treatment with tenofovir 2/2023.  She is responded well to treatment and her liver enzymes are now normal.  Discussed treatment of hepatitis B is generally lifelong, stopping treatment early can be associate with flares.  Of note her ultrasound from 2017 showed a mildly nodular appearing liver, no other signs of portal hypertension. Repeat US was normal. She is doing well with tenofovir.     Interpretor was used today.     -Tenofovir 300 mg daily  -MELD labs and hepatitis B labs every 6 months  -Will recheck an ultrasound now, then will plan to screen for liver cancer every 6 months with ultrasound and AFP starting at age 50  -Recommend family numbers be tested for hepatitis B, recommend discussion of  hepatitis B with her children's pediatricians - they are negative per the patient      Orders Placed This Encounter   Procedures    US Abdomen Limited    Hepatic function panel    Basic metabolic panel    CBC with platelets    INR    Hep B Virus DNA Quant Real Time PCR       RTC 6 months with labs and RUQ US     Ca Harris MD MS  Hepatology/Liver Transplant  [facility]

## 2024-09-25 LAB
HBV DNA SERPL NAA+PROBE-ACNC: <10 IU/ML
HBV DNA SERPL NAA+PROBE-LOG IU: <1 {LOG_IU}/ML

## 2024-10-30 ENCOUNTER — ANCILLARY PROCEDURE (OUTPATIENT)
Dept: ULTRASOUND IMAGING | Facility: CLINIC | Age: 44
End: 2024-10-30
Attending: STUDENT IN AN ORGANIZED HEALTH CARE EDUCATION/TRAINING PROGRAM
Payer: COMMERCIAL

## 2024-10-30 DIAGNOSIS — B18.1 CHRONIC VIRAL HEPATITIS B WITHOUT DELTA AGENT AND WITHOUT COMA (H): ICD-10-CM

## 2024-10-30 DIAGNOSIS — R79.89 ELEVATED LFTS: ICD-10-CM

## 2024-10-30 PROCEDURE — 76705 ECHO EXAM OF ABDOMEN: CPT

## 2025-01-11 DIAGNOSIS — E11.9 TYPE 2 DIABETES MELLITUS WITHOUT COMPLICATION, WITHOUT LONG-TERM CURRENT USE OF INSULIN (H): ICD-10-CM

## 2025-01-13 RX ORDER — SEMAGLUTIDE 1.34 MG/ML
INJECTION, SOLUTION SUBCUTANEOUS
Qty: 3 ML | Refills: 0 | Status: SHIPPED | OUTPATIENT
Start: 2025-01-13

## 2025-02-09 DIAGNOSIS — E11.9 TYPE 2 DIABETES MELLITUS WITHOUT COMPLICATION, WITHOUT LONG-TERM CURRENT USE OF INSULIN (H): ICD-10-CM

## 2025-02-10 RX ORDER — SEMAGLUTIDE 1.34 MG/ML
INJECTION, SOLUTION SUBCUTANEOUS
Qty: 3 ML | Refills: 0 | OUTPATIENT
Start: 2025-02-10

## 2025-02-10 NOTE — TELEPHONE ENCOUNTER
Shasta Dixon, ARVIND CNP to [facility] Pool Regarding result: OZEMPIC, 1 MG/DOSE, 4 MG/3ML pen [Pharmacy Med Name: OZEMPIC 1MG PER DOSE (4MG/3ML) PFP]   KT      2/10/25 10:09 AM  No further refills until she is seen in clinic and  completed labs. Pls schedule appt      Called pt and lvm to call back and schedule an appt for OV and labs for refills to be given.

## 2025-02-18 ENCOUNTER — VIRTUAL VISIT (OUTPATIENT)
Dept: FAMILY MEDICINE | Facility: CLINIC | Age: 45
End: 2025-02-18
Payer: COMMERCIAL

## 2025-02-18 DIAGNOSIS — E11.9 TYPE 2 DIABETES MELLITUS WITHOUT COMPLICATION, WITH LONG-TERM CURRENT USE OF INSULIN (H): ICD-10-CM

## 2025-02-18 DIAGNOSIS — Z79.4 TYPE 2 DIABETES MELLITUS WITHOUT COMPLICATION, WITH LONG-TERM CURRENT USE OF INSULIN (H): ICD-10-CM

## 2025-02-18 DIAGNOSIS — E11.9 TYPE 2 DIABETES MELLITUS WITHOUT COMPLICATION, WITHOUT LONG-TERM CURRENT USE OF INSULIN (H): ICD-10-CM

## 2025-02-18 DIAGNOSIS — B18.1 HEPATITIS B, CHRONIC (H): ICD-10-CM

## 2025-02-18 PROCEDURE — 98014 SYNCH AUDIO-ONLY EST MOD 30: CPT | Performed by: NURSE PRACTITIONER

## 2025-02-18 RX ORDER — SEMAGLUTIDE 1.34 MG/ML
1 INJECTION, SOLUTION SUBCUTANEOUS
Qty: 3 ML | Refills: 0 | Status: SHIPPED | OUTPATIENT
Start: 2025-02-18 | End: 2025-02-18

## 2025-02-18 RX ORDER — SEMAGLUTIDE 1.34 MG/ML
1 INJECTION, SOLUTION SUBCUTANEOUS
Qty: 3 ML | Refills: 3 | Status: SHIPPED | OUTPATIENT
Start: 2025-02-18

## 2025-02-18 RX ORDER — GLUCOSAMINE HCL/CHONDROITIN SU 500-400 MG
CAPSULE ORAL
Qty: 100 EACH | Refills: 3 | Status: SHIPPED | OUTPATIENT
Start: 2025-02-18

## 2025-02-18 NOTE — PROGRESS NOTES
"Reji is a 44 year old who is being evaluated via a billable telephone visit.    What phone number would you like to be contacted at? [phone]  How would you like to obtain your AVS? Mail a copy  Originating Location (pt. Location): Home    Distant Location (provider location):  On-site  Telephone visit completed due to the patient did not consent to a video visit.    If patient has telephone visit, have they been educated on video visit as preferred visit method and offered to change to video visit? NO - PATIENT DECLINED    Instructions Relayed to Patient by Virtual Roomer:     If patient is not active on [employer]:  Relayed the following to patient: \"Would you like us to text or email you a link to join your appointment now or when your provider is ready to initiate the virtual visit?\"      Patient Confirmed they will join visit via: Provider to call patient for telephone visit   Reminded patient to ensure they were logged on to virtual visit by arrival time listed.   Asked if patient has flexibility to initiate visit sooner than arrival time: patient stated yes, documented in appointment notes availability to initiate visit earlier than arrival time     If pediatric virtual visit, ensured pediatric patient along with parent/guardian will be present for video visit.     Patient offered the website [email or web address] and/or phone number to [employer] Help line: [phone]   Assessment & Plan     Type 2 diabetes mellitus without complication, with long-term current use of insulin (H)  Phone  present during visit.  Continue current medications. A1C pending, diabetes has been controlled.  Continue dietary efforts and physical activity.   Follow up in clinic in person for follow up appointment in 3 months  - Hemoglobin A1c; Future  - PRIMARY CARE FOLLOW-UP SCHEDULING; Future  - Semaglutide, 1 MG/DOSE, (OZEMPIC, 1 MG/DOSE,) 4 MG/3ML pen; Inject 1 mg subcutaneously every 7 days.  - alcohol " "swab prep pads; Use to swab area of injection/freddy as directed. *PLEASE SCHEDULE APPT FOR FURTHER REFILLS*  - blood glucose (ACCU-CHEK GUIDE) test strip; Use to test blood sugar once daily or as needed.  Visit due for more.  - insulin pen needle (31G X 6 MM) 31G X 6 MM miscellaneous; Use 1 pen needles daily or as directed.  - blood glucose calibration (NO BRAND SPECIFIED) solution; To accompany: Blood Glucose Monitor Brands: per insurance.    Hepatitis B, chronic (H)  She follows with GI. She is on Tenofovir. Stable    BMI  Estimated body mass index is 26.15 kg/m  as calculated from the following:    Height as of 9/23/24: 1.473 m (4' 10\").    Weight as of 9/23/24: 56.7 kg (125 lb 1.6 oz).           Noah Mendoza is a 44 year old, presenting for the following health issues:  Refill Request      2/18/2025     9:28 AM   Additional Questions   Roomed by Mckeon   Accompanied by self         2/18/2025     9:28 AM   Patient Reported Additional Medications   Patient reports taking the following new medications no     History of Present Illness       Reason for visit:  Medication refill   She is taking medications regularly.                 Objective       Vitals:  No vitals were obtained today due to virtual visit.    Physical Exam   General: Alert and no distress //Respiratory: No audible wheeze, cough, or shortness of breath // Psychiatric:  Appropriate affect, tone, and pace of words          Phone call duration: 14 minutes  Signed Electronically by: ARVIND Vazquez CNP    "

## 2025-02-19 ENCOUNTER — LAB (OUTPATIENT)
Dept: LAB | Facility: CLINIC | Age: 45
End: 2025-02-19
Payer: COMMERCIAL

## 2025-02-19 DIAGNOSIS — E11.9 TYPE 2 DIABETES MELLITUS WITHOUT COMPLICATION, WITH LONG-TERM CURRENT USE OF INSULIN (H): ICD-10-CM

## 2025-02-19 DIAGNOSIS — Z79.4 TYPE 2 DIABETES MELLITUS WITHOUT COMPLICATION, WITH LONG-TERM CURRENT USE OF INSULIN (H): ICD-10-CM

## 2025-02-19 LAB
EST. AVERAGE GLUCOSE BLD GHB EST-MCNC: 131 MG/DL
HBA1C MFR BLD: 6.2 % (ref 0–5.6)

## 2025-02-19 PROCEDURE — 83036 HEMOGLOBIN GLYCOSYLATED A1C: CPT

## 2025-02-19 PROCEDURE — 36415 COLL VENOUS BLD VENIPUNCTURE: CPT

## 2025-03-03 ENCOUNTER — PATIENT OUTREACH (OUTPATIENT)
Dept: CARE COORDINATION | Facility: CLINIC | Age: 45
End: 2025-03-03
Payer: COMMERCIAL

## 2025-03-17 ENCOUNTER — PATIENT OUTREACH (OUTPATIENT)
Dept: CARE COORDINATION | Facility: CLINIC | Age: 45
End: 2025-03-17
Payer: COMMERCIAL

## 2025-04-08 ENCOUNTER — TELEPHONE (OUTPATIENT)
Dept: FAMILY MEDICINE | Facility: CLINIC | Age: 45
End: 2025-04-08

## 2025-04-08 ENCOUNTER — OFFICE VISIT (OUTPATIENT)
Dept: FAMILY MEDICINE | Facility: CLINIC | Age: 45
End: 2025-04-08
Payer: COMMERCIAL

## 2025-04-08 VITALS
SYSTOLIC BLOOD PRESSURE: 125 MMHG | OXYGEN SATURATION: 100 % | HEIGHT: 58 IN | RESPIRATION RATE: 18 BRPM | BODY MASS INDEX: 26.16 KG/M2 | TEMPERATURE: 97.9 F | WEIGHT: 124.6 LBS | DIASTOLIC BLOOD PRESSURE: 79 MMHG | HEART RATE: 69 BPM

## 2025-04-08 DIAGNOSIS — E11.9 TYPE 2 DIABETES MELLITUS WITHOUT COMPLICATION, WITHOUT LONG-TERM CURRENT USE OF INSULIN (H): Primary | ICD-10-CM

## 2025-04-08 DIAGNOSIS — R03.0 ELEVATED BLOOD PRESSURE READING WITHOUT DIAGNOSIS OF HYPERTENSION: ICD-10-CM

## 2025-04-08 DIAGNOSIS — E11.9 TYPE 2 DIABETES MELLITUS WITHOUT COMPLICATION, WITHOUT LONG-TERM CURRENT USE OF INSULIN (H): ICD-10-CM

## 2025-04-08 DIAGNOSIS — R79.89 ELEVATED LFTS: ICD-10-CM

## 2025-04-08 DIAGNOSIS — B18.1 CHRONIC VIRAL HEPATITIS B WITHOUT DELTA AGENT AND WITHOUT COMA (H): ICD-10-CM

## 2025-04-08 LAB
ALBUMIN SERPL BCG-MCNC: 4.3 G/DL (ref 3.5–5.2)
ALP SERPL-CCNC: 74 U/L (ref 40–150)
ALT SERPL W P-5'-P-CCNC: 13 U/L (ref 0–50)
ANION GAP SERPL CALCULATED.3IONS-SCNC: 11 MMOL/L (ref 7–15)
AST SERPL W P-5'-P-CCNC: 18 U/L (ref 0–45)
BILIRUB DIRECT SERPL-MCNC: 0.25 MG/DL (ref 0–0.3)
BILIRUB SERPL-MCNC: 0.7 MG/DL
BUN SERPL-MCNC: 11.4 MG/DL (ref 6–20)
CALCIUM SERPL-MCNC: 9.1 MG/DL (ref 8.8–10.4)
CHLORIDE SERPL-SCNC: 103 MMOL/L (ref 98–107)
CHOLEST SERPL-MCNC: 203 MG/DL
CREAT SERPL-MCNC: 0.66 MG/DL (ref 0.51–0.95)
CREAT UR-MCNC: 142 MG/DL
EGFRCR SERPLBLD CKD-EPI 2021: >90 ML/MIN/1.73M2
ERYTHROCYTE [DISTWIDTH] IN BLOOD BY AUTOMATED COUNT: 12 % (ref 10–15)
EST. AVERAGE GLUCOSE BLD GHB EST-MCNC: 117 MG/DL
FASTING STATUS PATIENT QL REPORTED: YES
FASTING STATUS PATIENT QL REPORTED: YES
GLUCOSE SERPL-MCNC: 107 MG/DL (ref 70–99)
HBA1C MFR BLD: 5.7 % (ref 0–5.6)
HCO3 SERPL-SCNC: 22 MMOL/L (ref 22–29)
HCT VFR BLD AUTO: 43.6 % (ref 35–47)
HDLC SERPL-MCNC: 59 MG/DL
HGB BLD-MCNC: 14.8 G/DL (ref 11.7–15.7)
LDLC SERPL CALC-MCNC: 127 MG/DL
MCH RBC QN AUTO: 29 PG (ref 26.5–33)
MCHC RBC AUTO-ENTMCNC: 33.9 G/DL (ref 31.5–36.5)
MCV RBC AUTO: 85 FL (ref 78–100)
MICROALBUMIN UR-MCNC: 77.3 MG/L
MICROALBUMIN/CREAT UR: 54.44 MG/G CR (ref 0–25)
NONHDLC SERPL-MCNC: 144 MG/DL
PLATELET # BLD AUTO: 257 10E3/UL (ref 150–450)
POTASSIUM SERPL-SCNC: 3.9 MMOL/L (ref 3.4–5.3)
PROT SERPL-MCNC: 7.7 G/DL (ref 6.4–8.3)
RBC # BLD AUTO: 5.11 10E6/UL (ref 3.8–5.2)
SODIUM SERPL-SCNC: 136 MMOL/L (ref 135–145)
TRIGL SERPL-MCNC: 87 MG/DL
WBC # BLD AUTO: 8.6 10E3/UL (ref 4–11)

## 2025-04-08 PROCEDURE — 82043 UR ALBUMIN QUANTITATIVE: CPT | Performed by: PHYSICIAN ASSISTANT

## 2025-04-08 PROCEDURE — 3074F SYST BP LT 130 MM HG: CPT | Performed by: PHYSICIAN ASSISTANT

## 2025-04-08 PROCEDURE — 87517 HEPATITIS B DNA QUANT: CPT | Performed by: PHYSICIAN ASSISTANT

## 2025-04-08 PROCEDURE — 3078F DIAST BP <80 MM HG: CPT | Performed by: PHYSICIAN ASSISTANT

## 2025-04-08 PROCEDURE — 80061 LIPID PANEL: CPT | Performed by: PHYSICIAN ASSISTANT

## 2025-04-08 PROCEDURE — 85610 PROTHROMBIN TIME: CPT | Performed by: PHYSICIAN ASSISTANT

## 2025-04-08 PROCEDURE — 82248 BILIRUBIN DIRECT: CPT | Performed by: PHYSICIAN ASSISTANT

## 2025-04-08 PROCEDURE — 80053 COMPREHEN METABOLIC PANEL: CPT | Performed by: PHYSICIAN ASSISTANT

## 2025-04-08 PROCEDURE — 82570 ASSAY OF URINE CREATININE: CPT | Performed by: PHYSICIAN ASSISTANT

## 2025-04-08 PROCEDURE — 99214 OFFICE O/P EST MOD 30 MIN: CPT | Performed by: PHYSICIAN ASSISTANT

## 2025-04-08 PROCEDURE — 83036 HEMOGLOBIN GLYCOSYLATED A1C: CPT | Performed by: PHYSICIAN ASSISTANT

## 2025-04-08 PROCEDURE — 85027 COMPLETE CBC AUTOMATED: CPT | Performed by: PHYSICIAN ASSISTANT

## 2025-04-08 PROCEDURE — 36415 COLL VENOUS BLD VENIPUNCTURE: CPT | Performed by: PHYSICIAN ASSISTANT

## 2025-04-08 RX ORDER — SEMAGLUTIDE 1.34 MG/ML
1 INJECTION, SOLUTION SUBCUTANEOUS
Qty: 3 ML | Refills: 5 | Status: SHIPPED | OUTPATIENT
Start: 2025-04-08

## 2025-04-08 RX ORDER — LANCETS
EACH MISCELLANEOUS
Qty: 100 EACH | Refills: 6 | Status: SHIPPED | OUTPATIENT
Start: 2025-04-08 | End: 2025-04-08

## 2025-04-08 RX ORDER — LANCETS
EACH MISCELLANEOUS
Qty: 100 EACH | Refills: 6 | Status: SHIPPED | OUTPATIENT
Start: 2025-04-08

## 2025-04-08 NOTE — PATIENT INSTRUCTIONS
At [facility], we strive to deliver an exceptional experience to you, every time we see you. If you receive a survey, please let us know what we are doing well and/or what we could improve upon, as we do value your feedback.  If you have MyChart, you can expect to receive results automatically within 24 hours of their completion.  Your provider will send a note interpreting your results as well.   If you do not have MyChart, you should receive your results in about a week by mail.    Your care team:                            Family Medicine Internal Medicine   MD Don Olivia, MD Fadia Waters, MD Ant Montez, MD Katie Monaco, PA-C    Tl Akers, MD Pediatrics   Ana Aj, MD Huong Reyes, ARVIND Luna CNP Tuyet Gomes, MD Ghada Dawson, MD Tamera Sarmiento, CNP     Quynh Pickard, PA-C Same-Day Provider (No follow-up visits)   ARVIND Vazquez, CONNIE Vance, PA-C    Grazyna Grace PA-C     Clinic hours: Monday - Thursday 7 am-6 pm; Fridays 7 am-5 pm.   Urgent care: Monday - Friday 10 am- 8 pm; Saturday and Sunday 9 am-5 pm.    Clinic: [phone]       [facility]: Monday - Thursday 8 am - 7 pm; Friday 8 am - 6 pm  [facility]: [phone]

## 2025-04-08 NOTE — TELEPHONE ENCOUNTER
Pharmacy fax:    Is patient testing a max of once daily?  Plan requires specific directions to process prescription.  Please fax back with frequency of how patient will be using medication and days supply limitations.        blood glucose (ACCU-CHEK GUIDE) test strip 100 strip 3 4/8/2025 -- No   Sig: Use to test blood sugar once daily or as needed.  Visit due for more.       AND        thin (NO BRAND SPECIFIED) lancets 100 each 6 4/8/2025 -- No   Sig: Use with lanceting device. To accompany: Blood Glucose Monitor Brands: per insurance.

## 2025-04-08 NOTE — PROGRESS NOTES
"  Assessment & Plan   Problem List Items Addressed This Visit          Digestive    Chronic viral hepatitis B without delta agent and without coma (H)       Endocrine    Type 2 diabetes mellitus without complication, without long-term current use of insulin (H) - Primary    Relevant Medications    Semaglutide, 1 MG/DOSE, (OZEMPIC, 1 MG/DOSE,) 4 MG/3ML pen    blood glucose (ACCU-CHEK GUIDE) test strip    thin (NO BRAND SPECIFIED) lancets    Other Relevant Orders    Lipid panel reflex to direct LDL Fasting    Albumin Random Urine Quantitative with Creat Ratio    Adult Eye  Referral    Hemoglobin A1c (Completed)    Creatinine    TSH with free T4 reflex       Other    Elevated blood pressure reading without diagnosis of hypertension     Other Visit Diagnoses       Elevated LFTs               Patient wants to continue on Ozempic another 6 months and then switch to an oral medication. Will reassess in 6 months   Continue current medications   Increase protein in the diet and start weight training to avoid muscle atrophy and bone thinning       BMI  Estimated body mass index is 25.82 kg/m  as calculated from the following:    Height as of this encounter: 1.48 m (4' 10.25\").    Weight as of this encounter: 56.5 kg (124 lb 9.6 oz).   Weight management plan: Discussed healthy diet and exercise guidelines          Noah Mendoza is a 44 year old, presenting for the following health issues:  Diabetes        4/8/2025    11:10 AM   Additional Questions   Roomed by shawn   Accompanied by self         4/8/2025    11:10 AM   Patient Reported Additional Medications   Patient reports taking the following new medications no     History of Present Illness       Diabetes:   She presents for follow up of diabetes.  She is checking home blood glucose one time daily.   She checks blood glucose before meals.  Blood glucose is never over 200 and never under 70.  When her blood glucose is low, the patient is asymptomatic for " confusion, blurred vision, lethargy and reports not feeling dizzy, shaky, or weak.   She has no concerns regarding her diabetes at this time.   She is not experiencing numbness or burning in feet, excessive thirst, blurry vision, weight changes or redness, sores or blisters on feet. The patient has had a diabetic eye exam in the last 12 months. Eye exam performed on forgot the date. Location of last eye exam [facility].        She eats 2-3 servings of fruits and vegetables daily.She consumes 0 sweetened beverage(s) daily.She exercises with enough effort to increase her heart rate 20 to 29 minutes per day.  She exercises with enough effort to increase her heart rate 5 days per week.   She is taking medications regularly.          Diabetes Follow-up    Patient wants to get off Ozempic in the next 6 months since she lost weight and eats better now and feels that she does not need an injectable medication anymore.     How often are you checking your blood sugar? One time daily  What time of day are you checking your blood sugars (select all that apply)?  Before meals  Have you had any blood sugars above 200?  No  Have you had any blood sugars below 70?  No  What symptoms do you notice when your blood sugar is low?  None  What concerns do you have today about your diabetes? None   Do you have any of these symptoms? (Select all that apply)  No numbness or tingling in feet.  No redness, sores or blisters on feet.  No complaints of excessive thirst.  No reports of blurry vision.  No significant changes to weight.  Have you had a diabetic eye exam in the last 12 months? No        BP Readings from Last 2 Encounters:   04/08/25 125/79   09/23/24 126/87     Hemoglobin A1C (%)   Date Value   04/08/2025 5.7 (H)   02/19/2025 6.2 (H)   01/21/2019 5.7 (H)   10/18/2017 5.6     LDL Cholesterol Calculated (mg/dL)   Date Value   04/01/2024 90   11/15/2022 74         Hypertension Follow-up    Do you check your blood pressure regularly  "outside of the clinic? Yes   Are you following a low salt diet? Yes  Are your blood pressures ever more than 140 on the top number (systolic) OR more   than 90 on the bottom number (diastolic), for example 140/90? No    BP Readings from Last 2 Encounters:   04/08/25 125/79   09/23/24 126/87     How many servings of fruits and vegetables do you eat daily?  2-3  On average, how many sweetened beverages do you drink each day (Examples: soda, juice, sweet tea, etc.  Do NOT count diet or artificially sweetened beverages)?   1  How many days per week do you exercise enough to make your heart beat faster? 3 or less  How many minutes a day do you exercise enough to make your heart beat faster? 9 or less  How many days per week do you miss taking your medication? 0          Objective    /79 (BP Location: Left arm, Patient Position: Sitting, Cuff Size: Adult Regular)   Pulse 69   Temp 97.9  F (36.6  C) (Oral)   Resp 18   Ht 1.48 m (4' 10.25\")   Wt 56.5 kg (124 lb 9.6 oz)   LMP 04/07/2025 (Exact Date)   SpO2 100%   BMI 25.82 kg/m    Body mass index is 25.82 kg/m .  Physical Exam               Signed Electronically by: Michela Monaco PA-C    "

## 2025-04-09 LAB
HBV DNA SERPL NAA+PROBE-ACNC: <10 IU/ML
HBV DNA SERPL NAA+PROBE-LOG IU: <1 {LOG_IU}/ML
INR PPP: 1.06 (ref 0.85–1.15)

## 2025-04-30 ENCOUNTER — TELEPHONE (OUTPATIENT)
Dept: GASTROENTEROLOGY | Facility: CLINIC | Age: 45
End: 2025-04-30
Payer: COMMERCIAL

## 2025-04-30 DIAGNOSIS — B18.1 CHRONIC VIRAL HEPATITIS B WITHOUT DELTA AGENT AND WITHOUT COMA (H): ICD-10-CM

## 2025-04-30 RX ORDER — TENOFOVIR DISOPROXIL FUMARATE 300 MG/1
300 TABLET, FILM COATED ORAL DAILY
Qty: 90 TABLET | Refills: 3 | Status: SHIPPED | OUTPATIENT
Start: 2025-04-30

## 2025-04-30 NOTE — TELEPHONE ENCOUNTER
[facility] Call Center    Phone Message    May a detailed message be left on voicemail: yes     Reason for Call: Medication Refill Request    Has the patient contacted the pharmacy for the refill? Yes   Name of medication being requested: tenofovir (VIREAD) 300 MG tablet  Provider who prescribed the medication: Ca Harris  Pharmacy: [id number] [place], [place]  Date medication is needed: ASAP    Action Taken: Message routed to:  [facility] Hep    Travel Screening: Not Applicable     Date of Service:

## 2025-05-11 ENCOUNTER — HEALTH MAINTENANCE LETTER (OUTPATIENT)
Age: 45
End: 2025-05-11

## 2025-05-13 ENCOUNTER — OFFICE VISIT (OUTPATIENT)
Dept: OPTOMETRY | Facility: CLINIC | Age: 45
End: 2025-05-13
Attending: PHYSICIAN ASSISTANT
Payer: COMMERCIAL

## 2025-05-13 DIAGNOSIS — E11.9 TYPE 2 DIABETES MELLITUS WITHOUT COMPLICATION, WITHOUT LONG-TERM CURRENT USE OF INSULIN (H): Primary | ICD-10-CM

## 2025-05-13 DIAGNOSIS — H52.223 REGULAR ASTIGMATISM OF BOTH EYES: ICD-10-CM

## 2025-05-13 DIAGNOSIS — H52.4 PRESBYOPIA: ICD-10-CM

## 2025-05-13 DIAGNOSIS — H52.13 MYOPIA, BILATERAL: ICD-10-CM

## 2025-05-13 PROCEDURE — 92015 DETERMINE REFRACTIVE STATE: CPT | Performed by: OPTOMETRIST

## 2025-05-13 PROCEDURE — 99214 OFFICE O/P EST MOD 30 MIN: CPT | Performed by: OPTOMETRIST

## 2025-05-13 ASSESSMENT — SLIT LAMP EXAM - LIDS
COMMENTS: NORMAL
COMMENTS: NORMAL

## 2025-05-13 ASSESSMENT — REFRACTION_MANIFEST
OS_SPHERE: -1.75
OS_AXIS: 015
OS_SPHERE: -1.75
OD_AXIS: 005
OD_ADD: +2.00
METHOD_AUTOREFRACTION: 1
OD_AXIS: 179
OS_CYLINDER: +0.75
OS_CYLINDER: +1.00
OD_SPHERE: -2.00
OD_CYLINDER: +1.25
OD_CYLINDER: +1.50
OS_AXIS: 011
OD_SPHERE: -2.25
OS_ADD: +2.00

## 2025-05-13 ASSESSMENT — EXTERNAL EXAM - RIGHT EYE: OD_EXAM: NORMAL

## 2025-05-13 ASSESSMENT — KERATOMETRY
OS_K2POWER_DIOPTERS: 45.75
OS_AXISANGLE2_DEGREES: 64
OD_AXISANGLE2_DEGREES: 96
OD_AXISANGLE_DEGREES: 6
OD_K1POWER_DIOPTERS: 45.5
OS_K1POWER_DIOPTERS: 45.50
OS_AXISANGLE_DEGREES: 154
OD_K2POWER_DIOPTERS: 46.25

## 2025-05-13 ASSESSMENT — VISUAL ACUITY
OD_CC+: -2
OS_SC: 20/20
OD_SC: 20/20
OS_CC+: -1
METHOD: SNELLEN - LINEAR
OS_CC: 20/20
OD_CC: 20/30
CORRECTION_TYPE: GLASSES

## 2025-05-13 ASSESSMENT — EXTERNAL EXAM - LEFT EYE: OS_EXAM: NORMAL

## 2025-05-13 ASSESSMENT — CONF VISUAL FIELD
OD_NORMAL: 1
OS_INFERIOR_NASAL_RESTRICTION: 0
OD_INFERIOR_TEMPORAL_RESTRICTION: 0
OS_SUPERIOR_TEMPORAL_RESTRICTION: 0
OD_INFERIOR_NASAL_RESTRICTION: 0
OD_SUPERIOR_NASAL_RESTRICTION: 0
OS_INFERIOR_TEMPORAL_RESTRICTION: 0
OS_NORMAL: 1
OS_SUPERIOR_NASAL_RESTRICTION: 0
OD_SUPERIOR_TEMPORAL_RESTRICTION: 0

## 2025-05-13 ASSESSMENT — REFRACTION_WEARINGRX
SPECS_TYPE: SVL
OS_AXIS: 015
OD_AXIS: 003
OD_CYLINDER: +0.75
OS_SPHERE: -1.75
OD_SPHERE: -1.50
OS_CYLINDER: +0.75

## 2025-05-13 ASSESSMENT — CUP TO DISC RATIO
OS_RATIO: 0.2
OD_RATIO: 0.2

## 2025-05-13 ASSESSMENT — TONOMETRY
OS_IOP_MMHG: 18.2
IOP_METHOD: ICARE
OD_IOP_MMHG: 17.8

## 2025-05-13 NOTE — PATIENT INSTRUCTIONS
Diabetes weakens the blood vessels all over the body, including the eyes. Damage to the blood vessels in the eyes can cause swelling or bleeding into part of the eye (called the retina). This is called diabetic retinopathy (DAR-tin--pu-thee). If not treated, this disease can cause vision loss or blindness.   Symptoms may include blurred or distorted vision, but many people have no symptoms. It's important to see your eye doctor regularly to check for problems.   Early treatment and good control can help protect your vision. Here are the things you can do to help prevent vision loss:      1. Keep your blood sugar levels under tight control.      2. Bring high blood pressure under control.      3. No smoking.      4. Have yearly dilated eye exams.        Myopia is a result of long eyes. It is commonly referred to as near-sightedness. Seeing clearly in the distance is the main challenge.     Astigmatism results from curvature differential in the cornea and crystalline lens which can cause a distorted image, as light rays are prevented from meeting at a common focus.     Eyeglass prescription given.     The affects of the dilating drops last for 4- 6 hours.  You will be more sensitive to light and vision will be blurry up close.  Do not drive if you do not feel comfortable.  Mydriatic sunglasses were given if needed.     Recommend annual eye exams.     Janiya Olvera O.D.  [address] [address]

## 2025-05-13 NOTE — LETTER
5/13/2025      Reji Lockwood  [address] [place] [address]      Dear Colleague,    Thank you for referring your patient, Reji Lockwood, to the [facility]. Please see a copy of my visit note below.    Chief Complaint   Patient presents with     Diabetic Eye Exam        Chief Complaint(s) and History of Present Illness(es)       Diabetic Eye Exam              Diabetes Type: Type 2 and on insulin    Duration: 2 years    Blood Sugars: is controlled                   Lab Results   Component Value Date    A1C 5.7 04/08/2025    A1C 6.2 02/19/2025    A1C 6.9 04/01/2024    A1C 6.5 07/20/2023    A1C 6.9 02/27/2023    A1C 5.7 01/21/2019    A1C 5.6 10/18/2017    A1C 5.7 06/19/2017    A1C 6.0 09/02/2015            Last Eye Exam: 4/15/2024  Dilated Previously: Yes    What are you currently using to see?  glasses    Distance Vision Acuity: Noticed gradual change in both eyes    Near Vision Acuity: Satisfied with vision while reading and using computer unaided    Eye Comfort: good  Do you use eye drops? : No  Occupation or Hobbies: work at home    Malcolm Reece - Optometric Assistant     Medical, surgical and family histories reviewed and updated 5/13/2025.       OBJECTIVE: See Ophthalmology exam    ASSESSMENT:    ICD-10-CM    1. Type 2 diabetes mellitus without complication, without long-term current use of insulin (H)  E11.9 Adult Eye  Referral          PLAN:    Reji Lockwood aware  eye exam results will be sent to Clinic - [facility].  There are no Patient Instructions on file for this visit.           Again, thank you for allowing me to participate in the care of your patient.        Sincerely,        Janiya Olvera OD    Electronically signed

## 2025-05-13 NOTE — PROGRESS NOTES
Chief Complaint   Patient presents with    Diabetic Eye Exam        Chief Complaint(s) and History of Present Illness(es)       Diabetic Eye Exam              Diabetes Type: Type 2 and on insulin    Duration: 2 years    Blood Sugars: is controlled                   Lab Results   Component Value Date    A1C 5.7 04/08/2025    A1C 6.2 02/19/2025    A1C 6.9 04/01/2024    A1C 6.5 07/20/2023    A1C 6.9 02/27/2023    A1C 5.7 01/21/2019    A1C 5.6 10/18/2017    A1C 5.7 06/19/2017    A1C 6.0 09/02/2015            Last Eye Exam: 4/15/2024  Dilated Previously: Yes    What are you currently using to see?  glasses    Distance Vision Acuity: Noticed gradual change in both eyes    Near Vision Acuity: Satisfied with vision while reading and using computer unaided    Eye Comfort: good  Do you use eye drops? : No  Occupation or Hobbies: work at home    Malcolm Reece - Optometric Assistant     Medical, surgical and family histories reviewed and updated 5/13/2025.       OBJECTIVE: See Ophthalmology exam    ASSESSMENT:    ICD-10-CM    1. Type 2 diabetes mellitus without complication, without long-term current use of insulin (H)  E11.9 Adult Eye  Referral          PLAN:    Reji Lockwood aware  eye exam results will be sent to Clinic - [facility].  There are no Patient Instructions on file for this visit.

## 2025-05-21 ENCOUNTER — ANCILLARY PROCEDURE (OUTPATIENT)
Dept: MAMMOGRAPHY | Facility: CLINIC | Age: 45
End: 2025-05-21
Attending: PHYSICIAN ASSISTANT
Payer: COMMERCIAL

## 2025-05-21 DIAGNOSIS — Z12.31 VISIT FOR SCREENING MAMMOGRAM: ICD-10-CM

## 2025-05-21 PROCEDURE — 77063 BREAST TOMOSYNTHESIS BI: CPT | Performed by: RADIOLOGY

## 2025-05-21 PROCEDURE — 77067 SCR MAMMO BI INCL CAD: CPT | Performed by: RADIOLOGY

## 2025-07-11 NOTE — NURSING NOTE
Hpi Title: Evaluation of Skin Lesions The following medication was given:     MEDICATION: Lakshmi  ROUTE: IM  SITE: LUQ - Gluteus  DOSE: 250mg/1ml  LOT #: [id number]  :  [employer]  EXPIRATION DATE:  2/2020  NDC#: 31354-831-81  Medication was given from clinic stock.  Patient tolerated the injection well.  She missed her clinic appointment yesterday with Dr. Norwood.  I was able to put her on his schedule today in [place] at 3pm.  The  that patient had at today's nurse visit will  for her at the visit this afternoon.  She is with [employer]. I scheduled her next Lakshmi injection for next Wednesday 11/22/2017 as she won't be available on Friday due to holiday activities.  Vitals taken except for weight.  This will be recorded this afternoon at the visit with Dr. Norwood.  Vianey Perry RN        Additional History: -\\nEst pt\\nLast skin check 2023\\nPersonal hx negative \\nFamily hx negative \\nNo spots of concern today

## (undated) DEVICE — SUCTION TIP YANKAUER W/O VENT K86

## (undated) DEVICE — DRAPE GYN/UROLOGY FLUID POUCH TUR 29455

## (undated) DEVICE — SOL WATER IRRIG 1000ML BOTTLE 07139-09

## (undated) DEVICE — CATH TRAY FOLEY 16FR BARDEX W/DRAIN BAG STATLOCK 300316A

## (undated) DEVICE — TUBING SUCTION 10'X3/16" N510

## (undated) DEVICE — SOL NACL 0.9% IRRIG 1000ML BOTTLE 2F7124

## (undated) DEVICE — PREP POVIDONE IODINE USP 10% TOPICAL SOL 64537161

## (undated) DEVICE — LIGHT HANDLE X2

## (undated) DEVICE — SPONGE RAY-TEC 4X8" 7318

## (undated) DEVICE — PREP SKIN SCRUB TRAY 4461A

## (undated) DEVICE — Device

## (undated) DEVICE — GLOVE ESTEEM POWDER FREE SMT 6.0  2D72PT60

## (undated) DEVICE — PREP POVIDONE IODINE USP 7.5% CLEANING SOL 64538161

## (undated) DEVICE — SU ETHILON 2 CT-2 30" D-6865

## (undated) DEVICE — NDL COUNTER 20CT 31142493

## (undated) DEVICE — LUBRICATING JELLY 2.7GM T00137

## (undated) DEVICE — SYR BULB IRRIG 50ML LATEX FREE 0035280

## (undated) DEVICE — GLOVE PROTEXIS BLUE W/NEU-THERA 6.0  2D73EB60

## (undated) DEVICE — DRAPE POUCH IRR 1016

## (undated) RX ORDER — ONDANSETRON 2 MG/ML
INJECTION INTRAMUSCULAR; INTRAVENOUS
Status: DISPENSED
Start: 2017-09-11

## (undated) RX ORDER — PHENYLEPHRINE HCL IN 0.9% NACL 1 MG/10 ML
SYRINGE (ML) INTRAVENOUS
Status: DISPENSED
Start: 2017-09-11